# Patient Record
Sex: FEMALE | Race: WHITE | NOT HISPANIC OR LATINO | Employment: OTHER | URBAN - METROPOLITAN AREA
[De-identification: names, ages, dates, MRNs, and addresses within clinical notes are randomized per-mention and may not be internally consistent; named-entity substitution may affect disease eponyms.]

---

## 2023-10-05 ENCOUNTER — HOSPITAL ENCOUNTER (EMERGENCY)
Facility: HOSPITAL | Age: 63
Discharge: HOME | End: 2023-10-05
Attending: STUDENT IN AN ORGANIZED HEALTH CARE EDUCATION/TRAINING PROGRAM
Payer: MEDICAID

## 2023-10-05 VITALS
WEIGHT: 131 LBS | DIASTOLIC BLOOD PRESSURE: 85 MMHG | HEART RATE: 100 BPM | TEMPERATURE: 98 F | RESPIRATION RATE: 26 BRPM | BODY MASS INDEX: 24.11 KG/M2 | SYSTOLIC BLOOD PRESSURE: 116 MMHG | HEIGHT: 62 IN | OXYGEN SATURATION: 94 %

## 2023-10-05 DIAGNOSIS — R52 PAIN: ICD-10-CM

## 2023-10-05 DIAGNOSIS — R00.0 TACHYCARDIA: ICD-10-CM

## 2023-10-05 DIAGNOSIS — Z91.148 NONCOMPLIANCE WITH MEDICATION REGIMEN: ICD-10-CM

## 2023-10-05 DIAGNOSIS — S90.02XA CONTUSION OF LEFT ANKLE, INITIAL ENCOUNTER: Primary | ICD-10-CM

## 2023-10-05 DIAGNOSIS — I48.0 PAROXYSMAL ATRIAL FIBRILLATION WITH RVR: ICD-10-CM

## 2023-10-05 LAB
ALBUMIN SERPL-MCNC: 3.2 G/DL (ref 3.4–4.8)
ALBUMIN/GLOB SERPL: 0.7 RATIO (ref 1.1–2)
ALP SERPL-CCNC: 73 UNIT/L (ref 40–150)
ALT SERPL-CCNC: 19 UNIT/L (ref 0–55)
APAP SERPL-MCNC: <17.4 UG/ML (ref 17.4–30)
AST SERPL-CCNC: 25 UNIT/L (ref 5–34)
BASOPHILS # BLD AUTO: 0.04 X10(3)/MCL
BASOPHILS NFR BLD AUTO: 0.8 %
BILIRUB SERPL-MCNC: 0.6 MG/DL
BUN SERPL-MCNC: 28.4 MG/DL (ref 9.8–20.1)
CALCIUM SERPL-MCNC: 7.8 MG/DL (ref 8.4–10.2)
CHLORIDE SERPL-SCNC: 108 MMOL/L (ref 98–107)
CO2 SERPL-SCNC: 21 MMOL/L (ref 23–31)
CREAT SERPL-MCNC: 2.22 MG/DL (ref 0.55–1.02)
EOSINOPHIL # BLD AUTO: 0.04 X10(3)/MCL (ref 0–0.9)
EOSINOPHIL NFR BLD AUTO: 0.8 %
ERYTHROCYTE [DISTWIDTH] IN BLOOD BY AUTOMATED COUNT: 17 % (ref 11.5–17)
ETHANOL SERPL-MCNC: <10 MG/DL
GFR SERPLBLD CREATININE-BSD FMLA CKD-EPI: 24 MLS/MIN/1.73/M2
GLOBULIN SER-MCNC: 4.7 GM/DL (ref 2.4–3.5)
GLUCOSE SERPL-MCNC: 140 MG/DL (ref 82–115)
HCT VFR BLD AUTO: 29.4 % (ref 37–47)
HGB BLD-MCNC: 8.4 G/DL (ref 12–16)
IMM GRANULOCYTES # BLD AUTO: 0.01 X10(3)/MCL (ref 0–0.04)
IMM GRANULOCYTES NFR BLD AUTO: 0.2 %
LYMPHOCYTES # BLD AUTO: 0.62 X10(3)/MCL (ref 0.6–4.6)
LYMPHOCYTES NFR BLD AUTO: 13.1 %
MAGNESIUM SERPL-MCNC: 2 MG/DL (ref 1.6–2.6)
MCH RBC QN AUTO: 25.5 PG (ref 27–31)
MCHC RBC AUTO-ENTMCNC: 28.6 G/DL (ref 33–36)
MCV RBC AUTO: 89.1 FL (ref 80–94)
MONOCYTES # BLD AUTO: 0.37 X10(3)/MCL (ref 0.1–1.3)
MONOCYTES NFR BLD AUTO: 7.8 %
NEUTROPHILS # BLD AUTO: 3.66 X10(3)/MCL (ref 2.1–9.2)
NEUTROPHILS NFR BLD AUTO: 77.3 %
PLATELET # BLD AUTO: 192 X10(3)/MCL (ref 130–400)
PMV BLD AUTO: 11.5 FL (ref 7.4–10.4)
POC CARDIAC TROPONIN I: 0.01 NG/ML (ref 0–0.08)
POTASSIUM SERPL-SCNC: 4.2 MMOL/L (ref 3.5–5.1)
PROT SERPL-MCNC: 7.9 GM/DL (ref 5.8–7.6)
RBC # BLD AUTO: 3.3 X10(6)/MCL (ref 4.2–5.4)
SALICYLATES SERPL-MCNC: <5 MG/DL (ref 15–30)
SAMPLE: NORMAL
SODIUM SERPL-SCNC: 138 MMOL/L (ref 136–145)
TSH SERPL-ACNC: 13.63 UIU/ML (ref 0.35–4.94)
WBC # SPEC AUTO: 4.74 X10(3)/MCL (ref 4.5–11.5)

## 2023-10-05 PROCEDURE — 93010 ELECTROCARDIOGRAM REPORT: CPT | Mod: ,,, | Performed by: INTERNAL MEDICINE

## 2023-10-05 PROCEDURE — 83735 ASSAY OF MAGNESIUM: CPT | Performed by: STUDENT IN AN ORGANIZED HEALTH CARE EDUCATION/TRAINING PROGRAM

## 2023-10-05 PROCEDURE — 84443 ASSAY THYROID STIM HORMONE: CPT | Performed by: STUDENT IN AN ORGANIZED HEALTH CARE EDUCATION/TRAINING PROGRAM

## 2023-10-05 PROCEDURE — 80143 DRUG ASSAY ACETAMINOPHEN: CPT | Performed by: STUDENT IN AN ORGANIZED HEALTH CARE EDUCATION/TRAINING PROGRAM

## 2023-10-05 PROCEDURE — 93010 EKG 12-LEAD: ICD-10-PCS | Mod: ,,, | Performed by: INTERNAL MEDICINE

## 2023-10-05 PROCEDURE — 96374 THER/PROPH/DIAG INJ IV PUSH: CPT

## 2023-10-05 PROCEDURE — 93005 ELECTROCARDIOGRAM TRACING: CPT

## 2023-10-05 PROCEDURE — 80179 DRUG ASSAY SALICYLATE: CPT | Performed by: STUDENT IN AN ORGANIZED HEALTH CARE EDUCATION/TRAINING PROGRAM

## 2023-10-05 PROCEDURE — 96375 TX/PRO/DX INJ NEW DRUG ADDON: CPT

## 2023-10-05 PROCEDURE — 99285 EMERGENCY DEPT VISIT HI MDM: CPT | Mod: 25

## 2023-10-05 PROCEDURE — 96376 TX/PRO/DX INJ SAME DRUG ADON: CPT

## 2023-10-05 PROCEDURE — 80053 COMPREHEN METABOLIC PANEL: CPT | Performed by: STUDENT IN AN ORGANIZED HEALTH CARE EDUCATION/TRAINING PROGRAM

## 2023-10-05 PROCEDURE — 25000003 PHARM REV CODE 250: Performed by: SPECIALIST

## 2023-10-05 PROCEDURE — 85025 COMPLETE CBC W/AUTO DIFF WBC: CPT | Performed by: STUDENT IN AN ORGANIZED HEALTH CARE EDUCATION/TRAINING PROGRAM

## 2023-10-05 PROCEDURE — 82077 ASSAY SPEC XCP UR&BREATH IA: CPT | Performed by: STUDENT IN AN ORGANIZED HEALTH CARE EDUCATION/TRAINING PROGRAM

## 2023-10-05 PROCEDURE — 25000003 PHARM REV CODE 250: Performed by: STUDENT IN AN ORGANIZED HEALTH CARE EDUCATION/TRAINING PROGRAM

## 2023-10-05 RX ORDER — AMIODARONE HYDROCHLORIDE 200 MG/1
200 TABLET ORAL DAILY
Qty: 30 TABLET | Refills: 0 | Status: ON HOLD | OUTPATIENT
Start: 2023-10-05 | End: 2023-10-11 | Stop reason: SDUPTHER

## 2023-10-05 RX ORDER — METOPROLOL SUCCINATE 50 MG/1
50 TABLET, EXTENDED RELEASE ORAL DAILY
Qty: 30 TABLET | Refills: 0 | Status: ON HOLD | OUTPATIENT
Start: 2023-10-05 | End: 2023-10-11 | Stop reason: SDUPTHER

## 2023-10-05 RX ORDER — DILTIAZEM HYDROCHLORIDE 5 MG/ML
20 INJECTION INTRAVENOUS
Status: COMPLETED | OUTPATIENT
Start: 2023-10-05 | End: 2023-10-05

## 2023-10-05 RX ORDER — DILTIAZEM HYDROCHLORIDE 180 MG/1
180 CAPSULE, COATED, EXTENDED RELEASE ORAL 2 TIMES DAILY
Qty: 60 CAPSULE | Refills: 0 | Status: ON HOLD | OUTPATIENT
Start: 2023-10-05 | End: 2023-10-11 | Stop reason: SDUPTHER

## 2023-10-05 RX ORDER — METOPROLOL TARTRATE 1 MG/ML
5 INJECTION, SOLUTION INTRAVENOUS
Status: DISCONTINUED | OUTPATIENT
Start: 2023-10-05 | End: 2023-10-05

## 2023-10-05 RX ORDER — METOPROLOL TARTRATE 1 MG/ML
5 INJECTION, SOLUTION INTRAVENOUS
Status: COMPLETED | OUTPATIENT
Start: 2023-10-05 | End: 2023-10-05

## 2023-10-05 RX ORDER — DILTIAZEM HYDROCHLORIDE 5 MG/ML
0.25 INJECTION INTRAVENOUS
Status: COMPLETED | OUTPATIENT
Start: 2023-10-05 | End: 2023-10-05

## 2023-10-05 RX ORDER — DILTIAZEM HYDROCHLORIDE 120 MG/1
120 CAPSULE, COATED, EXTENDED RELEASE ORAL
Status: COMPLETED | OUTPATIENT
Start: 2023-10-05 | End: 2023-10-05

## 2023-10-05 RX ADMIN — RIVAROXABAN 20 MG: 10 TABLET, FILM COATED ORAL at 07:10

## 2023-10-05 RX ADMIN — DILTIAZEM HYDROCHLORIDE 20 MG: 5 INJECTION INTRAVENOUS at 06:10

## 2023-10-05 RX ADMIN — DILTIAZEM HYDROCHLORIDE 120 MG: 120 CAPSULE, COATED, EXTENDED RELEASE ORAL at 07:10

## 2023-10-05 RX ADMIN — DILTIAZEM HYDROCHLORIDE 15 MG: 5 INJECTION INTRAVENOUS at 05:10

## 2023-10-05 RX ADMIN — METOPROLOL TARTRATE 5 MG: 1 INJECTION, SOLUTION INTRAVENOUS at 05:10

## 2023-10-05 NOTE — ED PROVIDER NOTES
"Encounter Date: 10/5/2023       History     Chief Complaint   Patient presents with    Tachycardia     Pt presented to [facility] for admission but had a fall earlier today and c/o pain to L ankle -abrasions to nose and L eyebrow -denies LOC so pt sent here for evaluation prior to psych placement -upon [facility] arrival -pt 's --pt denies chest pain      Patient is a 63-year-old white female with a history of anxiety, self-reported irregular heart rate who presented via EMS after being dropped off by her landlord a local psychiatric facility for her "nerves. " patient would like to have checked herself in but she states that on arrival she complains of left ankle pain and thus they called EMS for her to presents to the ER for evaluation.  Patient denies any SI/HI/AH/VH.  Patient was noted by EMS to be tachycardic with an irregular rhythm consistent with AFib with RVR.  Patient was noted to be normotensive.  On arrival patient states she has a contusion above her left eye that she she does not recall how she sustained.  Patient states she has left ankle pain but she is unsure how that occurred.  Patient denies any chest pain, palpitations, shortness of breath or abdominal pain.        Review of patient's allergies indicates:   Allergen Reactions    Pcn [penicillins] Itching     No past medical history on file.  No past surgical history on file.  No family history on file.     Review of Systems   Constitutional:  Negative for chills, fatigue and fever.   HENT:  Negative for congestion, sore throat and trouble swallowing.    Eyes:  Negative for pain and visual disturbance.   Respiratory:  Negative for cough, shortness of breath and wheezing.    Cardiovascular:  Negative for chest pain and palpitations.   Gastrointestinal:  Negative for abdominal pain, blood in stool, constipation, diarrhea, nausea and vomiting.   Genitourinary:  Negative for dysuria and hematuria.   Musculoskeletal:  Positive for arthralgias. Negative " for back pain and myalgias.   Skin:  Positive for wound. Negative for rash.   Neurological:  Negative for seizures, syncope and headaches.   Psychiatric/Behavioral:  Positive for dysphoric mood. Negative for agitation, behavioral problems, confusion, decreased concentration, hallucinations, self-injury, sleep disturbance and suicidal ideas. The patient is nervous/anxious. The patient is not hyperactive.        Physical Exam     Initial Vitals [10/05/23 1637]   BP Pulse Resp Temp SpO2   (!) 141/81 (!) 147 20 98.2 °F (36.8 °C) 99 %      MAP       --         Physical Exam    Nursing note and vitals reviewed.  Constitutional: She appears well-developed and well-nourished. She is not diaphoretic. No distress.   HENT:   Head: Normocephalic.   Right Ear: External ear normal.   Left Ear: External ear normal.   Nose: Nose normal.   Eyes: Conjunctivae and EOM are normal. Pupils are equal, round, and reactive to light. Right eye exhibits no discharge. Left eye exhibits no discharge. No scleral icterus.   Strabismus noted on exam patient states this has baseline.   Neck:   Normal range of motion.  Cardiovascular:  Normal heart sounds.     Exam reveals no gallop and no friction rub.       No murmur heard.  Tachycardic rate   Pulmonary/Chest: Breath sounds normal. No stridor. No respiratory distress. She has no wheezes. She has no rhonchi. She has no rales.   Abdominal: Abdomen is soft. She exhibits no distension. There is no abdominal tenderness. There is no rebound and no guarding.   Musculoskeletal:         General: Normal range of motion.      Cervical back: Normal range of motion.     Neurological: She is alert. She has normal strength. No cranial nerve deficit or sensory deficit. GCS score is 15. GCS eye subscore is 4. GCS verbal subscore is 5. GCS motor subscore is 6.   Skin: Skin is warm. No rash noted. No erythema.   Psychiatric: Her behavior is normal. Judgment normal. Her mood appears anxious. She is not actively  hallucinating. Thought content is not paranoid and not delusional. Cognition and memory are normal. She expresses no homicidal and no suicidal ideation. She is attentive.         ED Course   Procedures  Labs Reviewed   COMPREHENSIVE METABOLIC PANEL - Abnormal; Notable for the following components:       Result Value    Chloride 108 (*)     Carbon Dioxide 21 (*)     Glucose Level 140 (*)     Blood Urea Nitrogen 28.4 (*)     Creatinine 2.22 (*)     Calcium Level Total 7.8 (*)     Protein Total 7.9 (*)     Albumin Level 3.2 (*)     Globulin 4.7 (*)     Albumin/Globulin Ratio 0.7 (*)     All other components within normal limits   SALICYLATE LEVEL - Abnormal; Notable for the following components:    Salicylate Level <5.0 (*)     All other components within normal limits   TSH - Abnormal; Notable for the following components:    TSH 13.631 (*)     All other components within normal limits   ACETAMINOPHEN LEVEL - Abnormal; Notable for the following components:    Acetaminophen Level <17.4 (*)     All other components within normal limits   CBC WITH DIFFERENTIAL - Abnormal; Notable for the following components:    RBC 3.30 (*)     Hgb 8.4 (*)     Hct 29.4 (*)     MCH 25.5 (*)     MCHC 28.6 (*)     MPV 11.5 (*)     All other components within normal limits   ALCOHOL,MEDICAL (ETHANOL) - Normal   MAGNESIUM - Normal   CBC W/ AUTO DIFFERENTIAL    Narrative:     The following orders were created for panel order CBC Auto Differential.  Procedure                               Abnormality         Status                     ---------                               -----------         ------                     CBC with Differential[[id number]]       Abnormal            Final result                 Please view results for these tests on the individual orders.   TROPONIN ISTAT   DRUG SCREEN, URINE (BEAKER)   URINALYSIS, REFLEX TO URINE CULTURE   POCT TROPONIN     EKG Readings: (Independently Interpreted)   Initial Reading: No STEMI.  Rhythm: Atrial Flutter. Heart Rate: 156. Ectopy: No Ectopy. Conduction: Normal. ST Segments: Normal ST Segments.       Imaging Results              CT Head Without Contrast (Final result)  Result time 10/05/23 17:52:32      Final result by Hetal Galeano MD (10/05/23 17:52:32)                   Impression:      No appreciable acute intracranial abnormality.    Periventricular and subcortical white matter changes most compatible with chronic small vessel ischemic disease.      Electronically signed by: Hetal Galeano  Date:    10/05/2023  Time:    17:52               Narrative:    EXAMINATION:  CT HEAD WITHOUT CONTRAST    CLINICAL HISTORY:  contusion to head unknown cause;    TECHNIQUE:  Low dose axial CT images obtained throughout the head without intravenous contrast.  Axial, sagittal and coronal reconstructions were performed and interpreted.    DLP: 2427 mGycm    All CT scans at this location are performed using dose optimization techniques as appropriate to a performed exam including the following automated exposure control, adjustment of the mA and/or kV according to patient size and/or use of iterative reconstruction technique    COMPARISON:  CT head 12/14/2018    FINDINGS:  BRAIN: Gray white differentiation is maintained. Periventricular and subcortical white matter changes most compatible with chronic small vessel ischemic disease.  There are faint basal ganglia calcifications.  No hemorrhage. No edema. No mass effect or midline shift.  The posterior fossa and midline structures are unremarkable.    VENTRICLES: Normal in size and configuration.    EXTRA-AXIAL: No abnormal extra-axial collections.    BONES: Calvarium is intact.    SINUSES AND MASTOIDS: Chronic opacification of the left frontal sinus.                                       X-Ray Ankle Complete Left (Final result)  Result time 10/05/23 17:43:27      Final result by Hetal Galeano MD (10/05/23 17:43:27)                    Impression:      Soft tissue swelling without appreciable acute osseous abnormality.      Electronically signed by: Hetal Galeano  Date:    10/05/2023  Time:    17:43               Narrative:    EXAMINATION:  XR ANKLE COMPLETE 3 VIEW LEFT    CLINICAL HISTORY:  Pain, unspecified    TECHNIQUE:  AP, lateral and oblique views of the left ankle were performed.    COMPARISON:  None    FINDINGS:  BONES: No fracture. No dislocation. Ankle mortise is symmetric.      SOFT TISSUES: Nonfocal soft tissue swelling.                                           X-Ray Chest 1 View (Final result)  Result time 10/05/23 17:41:59      Final result by Meryl Catherine MD (10/05/23 17:41:59)                   Impression:      Prominent interstitial markings without focal consolidation.      Electronically signed by: Meryl Catherine  Date:    10/05/2023  Time:    17:41               Narrative:    EXAMINATION:  XR CHEST 1 VIEW    CLINICAL HISTORY:  a fib;    TECHNIQUE:  AP chest    COMPARISON:  Chest x-ray dated 12/15/2018    FINDINGS:  The heart is normal in size.  There are prominent interstitial markings without focal consolidation.  There is no pleural effusion or visible pneumothorax.                                       Medications   metoprolol injection 5 mg (5 mg Intravenous Given 10/5/23 1701)   diltiaZEM injection 15 mg (15 mg Intravenous Given 10/5/23 1754)   diltiaZEM injection 20 mg (20 mg Intravenous Given 10/5/23 1840)   diltiaZEM 24 hr capsule 120 mg (120 mg Oral Given 10/5/23 1918)   rivaroxaban tablet 20 mg (20 mg Oral Given 10/5/23 1918)     Medical Decision Making  Differentials:  Dysrhythmia, AFib RVR, SVT, electrolyte disturbance, drug abuse, dehydration   63-year-old anxious appearing female presents via EMS with tachycardia but normotensive.  Atrial flutter with rapid rate was identified and it appears per chart review this is chronic.  Patient appears to be noncompliant with medications likely being the cause.   Lopressor x2 was given with only slight improvement this Cardizem was given.  Basic labs largely unremarkable except for hypothyroidism which I do not suspect is the cause.    I, Dr. Lyons, assumed care of this patient at 6:00 p.m.; patient is resting comfortably; her pulse did increase since the 1st dose of Cardizem so she will be given Cardizem 20 mg IV push and will give Cardizem CD p.o. 120 mg along with Xarelto 20 mg p.o.; patient has been noncompliant with her cardiac medications    Amount and/or Complexity of Data Reviewed  External Data Reviewed: labs and notes.  Labs: ordered. Decision-making details documented in ED Course.  Radiology: ordered. Decision-making details documented in ED Course.  ECG/medicine tests: ordered and independent interpretation performed. Decision-making details documented in ED Course.    Risk  Prescription drug management.  Risk Details: Patient's atrial fibrillation has improved with a decreased rate following administration of 20 mg of Cardizem IV and she was given Cardizem 120 mg CD p.o.; her medications for her atrial fibrillation were printed and will be sent with her back to [facility]; patient initially came with left ankle pain and the x-ray was negative and most likely a contusion; recommend RICE; recommend follow up with Cardiology within the next 2-3 days; prescriptions were printed for Cardizem  mg to be taken twice a day, amiodarone 200 mg daily, Xarelto 20 mg daily and Toprol 50 mg daily which were all her previous medications for her atrial fibrillation; she should also continue any other remaining medication she was on previously       Patient Vitals for the past 24 hrs:   BP Temp Temp src Pulse Resp SpO2 Height Weight   10/05/23 2007 116/85 -- -- 90 (!) 33 (!) 82 % -- --   10/05/23 1942 -- -- -- 103 19 -- -- --   10/05/23 1922 109/81 -- -- (!) 116 15 (!) 78 % -- --   10/05/23 1918 109/81 -- -- -- -- -- -- --   10/05/23 1902 105/79 -- -- (!)  "143 19 100 % -- --   10/05/23 1809 113/86 -- -- (!) 117 20 95 % -- --   10/05/23 1715 -- -- -- (!) 124 (!) 27 100 % -- --   10/05/23 1637 (!) 141/81 98.2 °F (36.8 °C) Oral (!) 147 20 99 % 5' 2" (1.575 m) 59.4 kg (131 lb)   Her oxygen saturations prior to discharge were not correlating with her pulse and were incorrect; her oxygen saturation is %              The patient is resting comfortably and in no acute distress.  She states that her symptoms have improved after treatment in Emergency Department. I personally discussed her test results and treatment plan.  Gave strict ED precautions.  Specific conditions for return to the emergency department and importance of follow up with her primary care provided or the physician listed on the discharge instructions.  Patient voices understanding and agrees to the plan discussed. All patients' questions have been answered at this time.   She has remained hemodynamically stable throughout entire stay in ED and is stable for discharge home.             Clinical Impression:   Final diagnoses:  [R00.0] Tachycardia  [R52] Pain  [S90.02XA] Contusion of left ankle, initial encounter (Primary)  [I48.0] Paroxysmal atrial fibrillation with RVR  [Z91.148] Noncompliance with medication regimen        ED Disposition Condition    Discharge Stable          ED Prescriptions       Medication Sig Dispense Start Date End Date Auth. Provider    rivaroxaban (XARELTO) 20 mg Tab Take 1 tablet (20 mg total) by mouth daily with dinner or evening meal. 30 tablet 10/5/2023 -- Julius Lyons MD    amiodarone (PACERONE) 200 MG Tab Take 1 tablet (200 mg total) by mouth once daily. 30 tablet 10/5/2023 -- Julius Lyons MD    metoprolol succinate (TOPROL-XL) 50 MG 24 hr tablet Take 1 tablet (50 mg total) by mouth once daily. 30 tablet 10/5/2023 -- Julius Lyons MD    diltiaZEM (CARDIZEM CD) 180 MG 24 hr capsule Take 1 capsule (180 mg total) by mouth 2 (two) times daily. 60 capsule 10/5/2023 " -- Julius Lyons MD          Follow-up Information       Follow up With Specialties Details Why Contact Info    Cardiology  In 2 days               Julius Lyons MD  10/05/23 2038

## 2023-10-05 NOTE — ED NOTES
Patient arrived here by mae was picked from [facility] from a previous fall wants medical clearance from that fall left ankle pain with swelling abrasions to face. Patient denies any loc states from a group home wanted to go to [facility] because nerves are bad denies SI or HI.

## 2023-10-06 NOTE — ED NOTES
Solange from [facility] called and reports that patient continuity of care would be too high and they will not be able to accept patient

## 2023-10-06 NOTE — ED NOTES
Spoke with Faviola Wang from pt's group home regarding declination for admission @ [facility]. Informed Faviola of care rendered thus far and need for pt to be seen and cleared by a cardiologist before admission to a psychiatric facility. Informed her that pt has been discharged at this time and needs transportation back to their facility. States she will pick pt up but it may be a while.

## 2023-10-08 ENCOUNTER — HOSPITAL ENCOUNTER (INPATIENT)
Facility: HOSPITAL | Age: 63
LOS: 3 days | Discharge: PSYCHIATRIC OR SUBSTANCE USE FACILITY | DRG: 309 | End: 2023-10-11
Attending: EMERGENCY MEDICINE | Admitting: INTERNAL MEDICINE
Payer: MEDICAID

## 2023-10-08 DIAGNOSIS — R00.0 TACHYCARDIA: ICD-10-CM

## 2023-10-08 DIAGNOSIS — R79.89 ELEVATED TROPONIN: ICD-10-CM

## 2023-10-08 DIAGNOSIS — I48.91 ATRIAL FIBRILLATION WITH RAPID VENTRICULAR RESPONSE: ICD-10-CM

## 2023-10-08 DIAGNOSIS — R07.9 CHEST PAIN, UNSPECIFIED TYPE: ICD-10-CM

## 2023-10-08 DIAGNOSIS — R79.89 ELEVATED TROPONIN I LEVEL: ICD-10-CM

## 2023-10-08 DIAGNOSIS — R07.9 CHEST PAIN: ICD-10-CM

## 2023-10-08 DIAGNOSIS — F29 PSYCHOSIS, UNSPECIFIED PSYCHOSIS TYPE: Primary | ICD-10-CM

## 2023-10-08 PROBLEM — I10 ESSENTIAL HYPERTENSION: Status: ACTIVE | Noted: 2023-10-08

## 2023-10-08 PROBLEM — E03.9 HYPOTHYROIDISM: Status: ACTIVE | Noted: 2021-08-15

## 2023-10-08 PROBLEM — F31.9 BIPOLAR DISORDER: Status: ACTIVE | Noted: 2021-07-27

## 2023-10-08 PROBLEM — E78.5 DYSLIPIDEMIA: Status: ACTIVE | Noted: 2023-10-08

## 2023-10-08 PROBLEM — D50.9 IRON DEFICIENCY ANEMIA: Status: ACTIVE | Noted: 2021-12-28

## 2023-10-08 PROBLEM — I48.0 PAROXYSMAL ATRIAL FIBRILLATION: Status: ACTIVE | Noted: 2021-08-15

## 2023-10-08 PROBLEM — I73.9 PAD (PERIPHERAL ARTERY DISEASE): Status: ACTIVE | Noted: 2023-10-08

## 2023-10-08 LAB
ALBUMIN SERPL-MCNC: 3.5 G/DL (ref 3.4–4.8)
ALBUMIN/GLOB SERPL: 0.8 RATIO (ref 1.1–2)
ALP SERPL-CCNC: 74 UNIT/L (ref 40–150)
ALT SERPL-CCNC: 18 UNIT/L (ref 0–55)
APAP SERPL-MCNC: <17.4 UG/ML (ref 17.4–30)
AST SERPL-CCNC: 29 UNIT/L (ref 5–34)
BASOPHILS # BLD AUTO: 0.08 X10(3)/MCL
BASOPHILS NFR BLD AUTO: 1.1 %
BILIRUB SERPL-MCNC: 1 MG/DL
BUN SERPL-MCNC: 24.4 MG/DL (ref 9.8–20.1)
CALCIUM SERPL-MCNC: 9.1 MG/DL (ref 8.4–10.2)
CHLORIDE SERPL-SCNC: 108 MMOL/L (ref 98–107)
CO2 SERPL-SCNC: 20 MMOL/L (ref 23–31)
CREAT SERPL-MCNC: 2.11 MG/DL (ref 0.55–1.02)
EOSINOPHIL # BLD AUTO: 0.01 X10(3)/MCL (ref 0–0.9)
EOSINOPHIL NFR BLD AUTO: 0.1 %
ERYTHROCYTE [DISTWIDTH] IN BLOOD BY AUTOMATED COUNT: 16.8 % (ref 11.5–17)
ETHANOL SERPL-MCNC: <10 MG/DL
GFR SERPLBLD CREATININE-BSD FMLA CKD-EPI: 26 MLS/MIN/1.73/M2
GLOBULIN SER-MCNC: 4.5 GM/DL (ref 2.4–3.5)
GLUCOSE SERPL-MCNC: 131 MG/DL (ref 82–115)
HCT VFR BLD AUTO: 30.1 % (ref 37–47)
HGB BLD-MCNC: 8.3 G/DL (ref 12–16)
IMM GRANULOCYTES # BLD AUTO: 0.03 X10(3)/MCL (ref 0–0.04)
IMM GRANULOCYTES NFR BLD AUTO: 0.4 %
LYMPHOCYTES # BLD AUTO: 0.68 X10(3)/MCL (ref 0.6–4.6)
LYMPHOCYTES NFR BLD AUTO: 9.4 %
MCH RBC QN AUTO: 23.4 PG (ref 27–31)
MCHC RBC AUTO-ENTMCNC: 27.6 G/DL (ref 33–36)
MCV RBC AUTO: 84.8 FL (ref 80–94)
MONOCYTES # BLD AUTO: 0.59 X10(3)/MCL (ref 0.1–1.3)
MONOCYTES NFR BLD AUTO: 8.2 %
NEUTROPHILS # BLD AUTO: 5.81 X10(3)/MCL (ref 2.1–9.2)
NEUTROPHILS NFR BLD AUTO: 80.8 %
NRBC BLD AUTO-RTO: 0 %
PLATELET # BLD AUTO: 194 X10(3)/MCL (ref 130–400)
PMV BLD AUTO: 11.8 FL (ref 7.4–10.4)
POCT GLUCOSE: 136 MG/DL (ref 70–110)
POTASSIUM SERPL-SCNC: 4.6 MMOL/L (ref 3.5–5.1)
PROT SERPL-MCNC: 8 GM/DL (ref 5.8–7.6)
RBC # BLD AUTO: 3.55 X10(6)/MCL (ref 4.2–5.4)
SALICYLATES SERPL-MCNC: <5 MG/DL (ref 15–30)
SODIUM SERPL-SCNC: 139 MMOL/L (ref 136–145)
TROPONIN I SERPL-MCNC: 0.05 NG/ML (ref 0–0.04)
VALPROATE SERPL-MCNC: <12.5 UG/ML (ref 50–100)
WBC # SPEC AUTO: 7.2 X10(3)/MCL (ref 4.5–11.5)

## 2023-10-08 PROCEDURE — 93005 ELECTROCARDIOGRAM TRACING: CPT

## 2023-10-08 PROCEDURE — 96376 TX/PRO/DX INJ SAME DRUG ADON: CPT

## 2023-10-08 PROCEDURE — 25000003 PHARM REV CODE 250

## 2023-10-08 PROCEDURE — 80143 DRUG ASSAY ACETAMINOPHEN: CPT | Performed by: EMERGENCY MEDICINE

## 2023-10-08 PROCEDURE — 96365 THER/PROPH/DIAG IV INF INIT: CPT

## 2023-10-08 PROCEDURE — 82077 ASSAY SPEC XCP UR&BREATH IA: CPT | Performed by: EMERGENCY MEDICINE

## 2023-10-08 PROCEDURE — G0378 HOSPITAL OBSERVATION PER HR: HCPCS

## 2023-10-08 PROCEDURE — 96375 TX/PRO/DX INJ NEW DRUG ADDON: CPT

## 2023-10-08 PROCEDURE — 96366 THER/PROPH/DIAG IV INF ADDON: CPT

## 2023-10-08 PROCEDURE — 80053 COMPREHEN METABOLIC PANEL: CPT | Performed by: EMERGENCY MEDICINE

## 2023-10-08 PROCEDURE — 25000003 PHARM REV CODE 250: Performed by: EMERGENCY MEDICINE

## 2023-10-08 PROCEDURE — 84484 ASSAY OF TROPONIN QUANT: CPT | Performed by: EMERGENCY MEDICINE

## 2023-10-08 PROCEDURE — 63600175 PHARM REV CODE 636 W HCPCS: Performed by: EMERGENCY MEDICINE

## 2023-10-08 PROCEDURE — 85025 COMPLETE CBC W/AUTO DIFF WBC: CPT | Performed by: EMERGENCY MEDICINE

## 2023-10-08 PROCEDURE — 80164 ASSAY DIPROPYLACETIC ACD TOT: CPT

## 2023-10-08 PROCEDURE — 21400001 HC TELEMETRY ROOM

## 2023-10-08 PROCEDURE — 99285 EMERGENCY DEPT VISIT HI MDM: CPT

## 2023-10-08 PROCEDURE — 82962 GLUCOSE BLOOD TEST: CPT

## 2023-10-08 PROCEDURE — 80179 DRUG ASSAY SALICYLATE: CPT | Performed by: EMERGENCY MEDICINE

## 2023-10-08 RX ORDER — AMIODARONE HYDROCHLORIDE 200 MG/1
200 TABLET ORAL DAILY
Status: DISCONTINUED | OUTPATIENT
Start: 2023-10-09 | End: 2023-10-11 | Stop reason: HOSPADM

## 2023-10-08 RX ORDER — MAGNESIUM SULFATE HEPTAHYDRATE 40 MG/ML
2 INJECTION, SOLUTION INTRAVENOUS
Status: COMPLETED | OUTPATIENT
Start: 2023-10-08 | End: 2023-10-08

## 2023-10-08 RX ORDER — SODIUM CHLORIDE 0.9 % (FLUSH) 0.9 %
10 SYRINGE (ML) INJECTION EVERY 12 HOURS PRN
Status: DISCONTINUED | OUTPATIENT
Start: 2023-10-09 | End: 2023-10-11 | Stop reason: HOSPADM

## 2023-10-08 RX ORDER — ISOSORBIDE MONONITRATE 30 MG/1
30 TABLET, EXTENDED RELEASE ORAL
Status: ON HOLD | COMMUNITY
Start: 2023-10-05 | End: 2023-10-11 | Stop reason: SDUPTHER

## 2023-10-08 RX ORDER — DILTIAZEM HYDROCHLORIDE 5 MG/ML
25 INJECTION INTRAVENOUS
Status: COMPLETED | OUTPATIENT
Start: 2023-10-08 | End: 2023-10-08

## 2023-10-08 RX ORDER — ISOSORBIDE MONONITRATE 30 MG/1
30 TABLET, EXTENDED RELEASE ORAL DAILY
Status: DISCONTINUED | OUTPATIENT
Start: 2023-10-09 | End: 2023-10-09

## 2023-10-08 RX ORDER — FUROSEMIDE 20 MG/1
20 TABLET ORAL EVERY MORNING
Status: ON HOLD | COMMUNITY
Start: 2023-10-05 | End: 2023-10-11 | Stop reason: SDUPTHER

## 2023-10-08 RX ORDER — METOPROLOL TARTRATE 1 MG/ML
5 INJECTION, SOLUTION INTRAVENOUS
Status: DISCONTINUED | OUTPATIENT
Start: 2023-10-08 | End: 2023-10-08

## 2023-10-08 RX ORDER — ARIPIPRAZOLE 2 MG/1
2 TABLET ORAL
Status: ON HOLD | COMMUNITY
End: 2023-10-11 | Stop reason: HOSPADM

## 2023-10-08 RX ORDER — DILTIAZEM HYDROCHLORIDE 180 MG/1
180 CAPSULE, COATED, EXTENDED RELEASE ORAL 2 TIMES DAILY
Status: DISCONTINUED | OUTPATIENT
Start: 2023-10-09 | End: 2023-10-11 | Stop reason: HOSPADM

## 2023-10-08 RX ORDER — INSULIN ASPART 100 [IU]/ML
0-5 INJECTION, SOLUTION INTRAVENOUS; SUBCUTANEOUS
Status: DISCONTINUED | OUTPATIENT
Start: 2023-10-09 | End: 2023-10-11 | Stop reason: HOSPADM

## 2023-10-08 RX ORDER — POLYETHYLENE GLYCOL 3350 17 G/17G
17 POWDER, FOR SOLUTION ORAL DAILY
Status: DISCONTINUED | OUTPATIENT
Start: 2023-10-09 | End: 2023-10-11 | Stop reason: HOSPADM

## 2023-10-08 RX ORDER — METOPROLOL TARTRATE 1 MG/ML
5 INJECTION, SOLUTION INTRAVENOUS EVERY 5 MIN PRN
Status: COMPLETED | OUTPATIENT
Start: 2023-10-09 | End: 2023-10-09

## 2023-10-08 RX ORDER — ATORVASTATIN CALCIUM 20 MG/1
TABLET, FILM COATED ORAL
Status: ON HOLD | COMMUNITY
Start: 2023-10-04 | End: 2023-10-11 | Stop reason: SDUPTHER

## 2023-10-08 RX ORDER — METOPROLOL TARTRATE 1 MG/ML
5 INJECTION, SOLUTION INTRAVENOUS
Status: COMPLETED | OUTPATIENT
Start: 2023-10-08 | End: 2023-10-08

## 2023-10-08 RX ORDER — ESCITALOPRAM OXALATE 10 MG/1
1 TABLET ORAL DAILY
Status: ON HOLD | COMMUNITY
End: 2023-10-11 | Stop reason: SDUPTHER

## 2023-10-08 RX ORDER — BENAZEPRIL HYDROCHLORIDE 10 MG/1
10 TABLET ORAL
Status: ON HOLD | COMMUNITY
End: 2023-10-11 | Stop reason: SDUPTHER

## 2023-10-08 RX ORDER — METOPROLOL TARTRATE 1 MG/ML
INJECTION, SOLUTION INTRAVENOUS
Status: COMPLETED
Start: 2023-10-08 | End: 2023-10-08

## 2023-10-08 RX ORDER — DILTIAZEM HYDROCHLORIDE 180 MG/1
180 CAPSULE, COATED, EXTENDED RELEASE ORAL
Status: COMPLETED | OUTPATIENT
Start: 2023-10-08 | End: 2023-10-08

## 2023-10-08 RX ORDER — DILTIAZEM HYDROCHLORIDE 5 MG/ML
20 INJECTION INTRAVENOUS
Status: DISCONTINUED | OUTPATIENT
Start: 2023-10-08 | End: 2023-10-08

## 2023-10-08 RX ORDER — GLUCAGON 1 MG
1 KIT INJECTION
Status: DISCONTINUED | OUTPATIENT
Start: 2023-10-09 | End: 2023-10-11 | Stop reason: HOSPADM

## 2023-10-08 RX ORDER — AMIODARONE HYDROCHLORIDE 200 MG/1
200 TABLET ORAL
Status: COMPLETED | OUTPATIENT
Start: 2023-10-08 | End: 2023-10-08

## 2023-10-08 RX ORDER — ARIPIPRAZOLE 2 MG/1
2 TABLET ORAL DAILY
Status: DISCONTINUED | OUTPATIENT
Start: 2023-10-09 | End: 2023-10-11

## 2023-10-08 RX ORDER — NALOXONE HCL 0.4 MG/ML
0.02 VIAL (ML) INJECTION
Status: DISCONTINUED | OUTPATIENT
Start: 2023-10-09 | End: 2023-10-11 | Stop reason: HOSPADM

## 2023-10-08 RX ORDER — DIVALPROEX SODIUM 250 MG/1
250 TABLET, DELAYED RELEASE ORAL EVERY 12 HOURS
Status: DISCONTINUED | OUTPATIENT
Start: 2023-10-08 | End: 2023-10-11 | Stop reason: HOSPADM

## 2023-10-08 RX ORDER — LEVOTHYROXINE SODIUM 25 UG/1
25 TABLET ORAL
Status: DISCONTINUED | OUTPATIENT
Start: 2023-10-09 | End: 2023-10-11 | Stop reason: HOSPADM

## 2023-10-08 RX ORDER — METOPROLOL SUCCINATE 50 MG/1
50 TABLET, EXTENDED RELEASE ORAL DAILY
Status: DISCONTINUED | OUTPATIENT
Start: 2023-10-09 | End: 2023-10-10

## 2023-10-08 RX ORDER — IBUPROFEN 200 MG
16 TABLET ORAL
Status: DISCONTINUED | OUTPATIENT
Start: 2023-10-09 | End: 2023-10-11 | Stop reason: HOSPADM

## 2023-10-08 RX ORDER — ATORVASTATIN CALCIUM 20 MG/1
20 TABLET, FILM COATED ORAL DAILY
Status: DISCONTINUED | OUTPATIENT
Start: 2023-10-09 | End: 2023-10-11 | Stop reason: HOSPADM

## 2023-10-08 RX ORDER — ADENOSINE 3 MG/ML
6 INJECTION, SOLUTION INTRAVENOUS
Status: COMPLETED | OUTPATIENT
Start: 2023-10-08 | End: 2023-10-08

## 2023-10-08 RX ORDER — DILTIAZEM HYDROCHLORIDE 5 MG/ML
20 INJECTION INTRAVENOUS
Status: COMPLETED | OUTPATIENT
Start: 2023-10-08 | End: 2023-10-08

## 2023-10-08 RX ORDER — TRAZODONE HYDROCHLORIDE 100 MG/1
TABLET ORAL
Status: ON HOLD | COMMUNITY
Start: 2023-10-04 | End: 2023-10-11 | Stop reason: SDUPTHER

## 2023-10-08 RX ORDER — SERTRALINE HYDROCHLORIDE 100 MG/1
TABLET, FILM COATED ORAL
Status: ON HOLD | COMMUNITY
Start: 2023-10-04 | End: 2023-10-11 | Stop reason: SDUPTHER

## 2023-10-08 RX ORDER — ACETAMINOPHEN 325 MG/1
650 TABLET ORAL EVERY 4 HOURS PRN
Status: DISCONTINUED | OUTPATIENT
Start: 2023-10-09 | End: 2023-10-11 | Stop reason: HOSPADM

## 2023-10-08 RX ORDER — LEVOTHYROXINE SODIUM 25 UG/1
1 TABLET ORAL DAILY
Status: ON HOLD | COMMUNITY
End: 2023-10-11 | Stop reason: SDUPTHER

## 2023-10-08 RX ORDER — IBUPROFEN 200 MG
24 TABLET ORAL
Status: DISCONTINUED | OUTPATIENT
Start: 2023-10-09 | End: 2023-10-11 | Stop reason: HOSPADM

## 2023-10-08 RX ORDER — DILTIAZEM HYDROCHLORIDE 5 MG/ML
INJECTION INTRAVENOUS
Status: DISPENSED
Start: 2023-10-08 | End: 2023-10-09

## 2023-10-08 RX ORDER — TALC
6 POWDER (GRAM) TOPICAL NIGHTLY PRN
Status: DISCONTINUED | OUTPATIENT
Start: 2023-10-08 | End: 2023-10-11 | Stop reason: HOSPADM

## 2023-10-08 RX ORDER — DIVALPROEX SODIUM 250 MG/1
TABLET, DELAYED RELEASE ORAL
Status: ON HOLD | COMMUNITY
Start: 2023-10-04 | End: 2023-10-11 | Stop reason: SDUPTHER

## 2023-10-08 RX ADMIN — MAGNESIUM SULFATE HEPTAHYDRATE 2 G: 40 INJECTION, SOLUTION INTRAVENOUS at 10:10

## 2023-10-08 RX ADMIN — METOPROLOL TARTRATE 5 MG: 1 INJECTION, SOLUTION INTRAVENOUS at 08:10

## 2023-10-08 RX ADMIN — DILTIAZEM HYDROCHLORIDE 20 MG: 5 INJECTION, SOLUTION INTRAVENOUS at 08:10

## 2023-10-08 RX ADMIN — SODIUM CHLORIDE 1000 ML: 9 INJECTION, SOLUTION INTRAVENOUS at 10:10

## 2023-10-08 RX ADMIN — MAGNESIUM SULFATE HEPTAHYDRATE 2 G: 40 INJECTION, SOLUTION INTRAVENOUS at 08:10

## 2023-10-08 RX ADMIN — DILTIAZEM HYDROCHLORIDE 25 MG: 5 INJECTION, SOLUTION INTRAVENOUS at 09:10

## 2023-10-08 RX ADMIN — AMIODARONE HYDROCHLORIDE 200 MG: 200 TABLET ORAL at 09:10

## 2023-10-08 RX ADMIN — DILTIAZEM HYDROCHLORIDE 180 MG: 180 CAPSULE, COATED, EXTENDED RELEASE ORAL at 09:10

## 2023-10-08 RX ADMIN — DIVALPROEX SODIUM 250 MG: 250 TABLET, DELAYED RELEASE ORAL at 11:10

## 2023-10-08 RX ADMIN — ADENOSINE 6 MG: 3 INJECTION INTRAVENOUS at 08:10

## 2023-10-08 NOTE — ED PROVIDER NOTES
"Encounter Date: 10/8/2023       History     Chief Complaint   Patient presents with    Psychiatric Evaluation     C/o SI and auditory hallucinations. Juan M cruz present and reports paranoia. Pt states "I need help"       Mental Health Problem  The primary symptoms include bizarre behavior, delusions, disorganized speech, disorganized thinking, dysphoric mood and paranoia. The primary symptoms do not include aggression, agitation, depressed mood, hallucinations, homicidal ideas, negative symptoms, self-injury, somatic symptoms, suicidal ideas, suicidal threats or suicide attempt. The current episode started several days ago. This is a recurrent problem.   The degree of incapacity that she is experiencing as a consequence of her illness is moderate. Sequelae of the illness include an inability to work and harmed interpersonal relations. Additional symptoms of the illness include anhedonia, insomnia, attention impairment, flight of ideas, distractible and poor judgment. Additional symptoms of the illness do not include hypersomnia, appetite change, unexpected weight change, fatigue, agitation, psychomotor retardation, feelings of worthlessness, euphoric mood, increased goal-directed activity, inflated self-esteem, decreased need for sleep, visual change, headaches, abdominal pain or seizures. She does not admit to suicidal ideas. She does not have a plan to attempt suicide. She does not contemplate harming herself. She has not already injured self. She does not contemplate injuring another person. She has not already  injured another person. Risk factors that are present for mental illness include a history of mental illness.     Review of patient's allergies indicates:   Allergen Reactions    Pcn [penicillins] Itching     History reviewed. No pertinent past medical history.  History reviewed. No pertinent surgical history.  History reviewed. No pertinent family history.  Social History     Tobacco Use    Smoking " status: Never    Smokeless tobacco: Never   Substance Use Topics    Drug use: Not Currently     Review of Systems   Constitutional:  Negative for appetite change, fatigue and unexpected weight change.   Gastrointestinal:  Negative for abdominal pain.   Neurological:  Negative for seizures and headaches.   Psychiatric/Behavioral:  Positive for dysphoric mood and paranoia. Negative for agitation, hallucinations, homicidal ideas, self-injury and suicidal ideas. The patient has insomnia.    All other systems reviewed and are negative.      Physical Exam     Initial Vitals [10/08/23 1429]   BP Pulse Resp Temp SpO2   134/88 102 16 98.2 °F (36.8 °C) 97 %      MAP       --         Physical Exam    Nursing note and vitals reviewed.  Constitutional: She appears well-developed and well-nourished. She is not diaphoretic. No distress.   HENT:   Head: Normocephalic and atraumatic.   Right Ear: External ear normal.   Left Ear: External ear normal.   Eyes: EOM are normal. Pupils are equal, round, and reactive to light. Right eye exhibits no discharge. Left eye exhibits no discharge.   Neck: Neck supple. No thyromegaly present. No tracheal deviation present. No JVD present.   Normal range of motion.  Cardiovascular:  Normal rate, regular rhythm, normal heart sounds and intact distal pulses.     Exam reveals no gallop and no friction rub.       No murmur heard.  Pulmonary/Chest: Breath sounds normal. No stridor. No respiratory distress. She has no wheezes. She has no rhonchi. She has no rales.   Abdominal: Abdomen is soft. Bowel sounds are normal. She exhibits no distension. There is no abdominal tenderness. There is no rebound and no guarding.   Musculoskeletal:         General: No tenderness or edema. Normal range of motion.      Cervical back: Normal range of motion and neck supple.     Neurological: She is alert and oriented to person, place, and time. She has normal strength. No cranial nerve deficit or sensory deficit. GCS  score is 15. GCS eye subscore is 4. GCS verbal subscore is 5. GCS motor subscore is 6.   Skin: Skin is warm and dry. Capillary refill takes less than 2 seconds. No rash and no abscess noted. No erythema. No pallor.   Psychiatric:   Broad affect; disorganized though with paranoid delusions; unable to contract for safety ;         ED Course   Procedures  Labs Reviewed   DRUG SCREEN, URINE (BEAKER) - Abnormal; Notable for the following components:       Result Value    Amphetamines, Urine Positive (*)     All other components within normal limits    Narrative:     Cut off concentrations:    Amphetamines - 1000 ng/ml  Barbiturates - 200 ng/ml  Benzodiazepine - 200 ng/ml  Cannabinoids (THC) - 50 ng/ml  Cocaine - 300 ng/ml  Fentanyl - 1.0 ng/ml  MDMA - 500 ng/ml  Opiates - 300 ng/ml   Phencyclidine (PCP) - 25 ng/ml    Specimen submitted for drug analysis and tested for pH and specific gravity in order to evaluate sample integrity. Suspect tampering if specific gravity is <1.003 and/or pH is not within the range of 4.5 - 8.0  False negatives may result form substances such as bleach added to urine.  False positives may result for the presence of a substance with similar chemical structure to the drug or its metabolite.    This test provides only a PRELIMINARY analytical test result. A more specific alternate chemical method must be used in order to obtain a confirmed analytical result. Gas chromatography/mass spectrometry (GC/MS) is the preferred confirmatory method. Other chemical confirmation methods are available. Clinical consideration and professional judgement should be applied to any drug of abuse test result, particularly when preliminary positive results are used.    Positive results will be confirmed only at the physicians request. Unconfirmed screening results are to be used only for medical purposes (treatment).        URINALYSIS, REFLEX TO URINE CULTURE - Abnormal; Notable for the following components:     Appearance, UA Slightly Cloudy (*)     Protein, UA 2+ (*)     Blood, UA Trace (*)     Squamous Epithelial Cells, UA Moderate (*)     Mucous, UA Trace (*)     RBC, UA 6-10 (*)     All other components within normal limits   SALICYLATE LEVEL - Abnormal; Notable for the following components:    Salicylate Level <5.0 (*)     All other components within normal limits   ACETAMINOPHEN LEVEL - Abnormal; Notable for the following components:    Acetaminophen Level <17.4 (*)     All other components within normal limits   COMPREHENSIVE METABOLIC PANEL - Abnormal; Notable for the following components:    Chloride 108 (*)     Carbon Dioxide 20 (*)     Glucose Level 131 (*)     Blood Urea Nitrogen 24.4 (*)     Creatinine 2.11 (*)     Protein Total 8.0 (*)     Globulin 4.5 (*)     Albumin/Globulin Ratio 0.8 (*)     All other components within normal limits   CBC WITH DIFFERENTIAL - Abnormal; Notable for the following components:    RBC 3.55 (*)     Hgb 8.3 (*)     Hct 30.1 (*)     MCH 23.4 (*)     MCHC 27.6 (*)     MPV 11.8 (*)     All other components within normal limits   TROPONIN I - Abnormal; Notable for the following components:    Troponin-I 0.047 (*)     All other components within normal limits   VALPROIC ACID - Abnormal; Notable for the following components:    Valproic Acid Level <12.5 (*)     All other components within normal limits   POCT GLUCOSE - Abnormal; Notable for the following components:    POCT Glucose 136 (*)     All other components within normal limits   ALCOHOL,MEDICAL (ETHANOL) - Normal   TROPONIN I - Normal   CBC W/ AUTO DIFFERENTIAL    Narrative:     The following orders were created for panel order CBC Auto Differential.  Procedure                               Abnormality         Status                     ---------                               -----------         ------                     CBC with Differential[[id number]]       Abnormal            Final result                 Please view results  for these tests on the individual orders.   EXTRA TUBES    Narrative:     The following orders were created for panel order EXTRA TUBES.  Procedure                               Abnormality         Status                     ---------                               -----------         ------                     Light Blue Top Hold[[id number]]                             In process                 Gold Top Hold[[id number]]                                   In process                   Please view results for these tests on the individual orders.   LIGHT BLUE TOP HOLD   GOLD TOP HOLD   URINALYSIS, REFLEX TO URINE CULTURE   POCT GLUCOSE MONITORING CONTINUOUS        ECG Results              EKG 12-lead (In process)  Result time 10/09/23 06:21:10      In process by Interface, Lab In [facility] (10/09/23 06:21:10)                   Narrative:    Test Reason : R07.9,    Vent. Rate : 131 BPM     Atrial Rate : 262 BPM     P-R Int : 000 ms          QRS Dur : 078 ms      QT Int : 264 ms       P-R-T Axes : 000 085 -13 degrees     QTc Int : 389 ms    Atrial flutter with variable A-V block  Nonspecific T wave abnormality  Abnormal ECG  When compared with ECG of 08-OCT-2023 20:21,  Atrial flutter has replaced Atrial fibrillation  Nonspecific T wave abnormality now evident in Lateral leads    Referred By: [email or web address]   SELF           Confirmed By:                                      EKG 12-lead (In process)  Result time 10/09/23 06:22:23      In process by Interface, Lab In [facility] (10/09/23 06:22:23)                   Narrative:    Test Reason : R00.0,    Vent. Rate : 146 BPM     Atrial Rate : 141 BPM     P-R Int : 000 ms          QRS Dur : 080 ms      QT Int : 324 ms       P-R-T Axes : 000 092 016 degrees     QTc Int : 504 ms    Atrial fibrillation with rapid ventricular response  Rightward axis  Abnormal ECG  When compared with ECG of 08-OCT-2023 20:05,  Previous ECG has undetermined rhythm, needs review    Referred By: [email or web address]    SELF           Confirmed By:                                   Imaging Results              X-Ray Chest 1 View (In process)                      Medications   diltiaZEM (CARDIZEM) 5 mg/mL injection (25 mg  Not Given 10/8/23 2025)   rivaroxaban tablet 15 mg (has no administration in time range)   metoprolol succinate (TOPROL-XL) 24 hr tablet 50 mg (has no administration in time range)   diltiaZEM 24 hr capsule 180 mg (has no administration in time range)   amiodarone tablet 200 mg (has no administration in time range)   sodium chloride 0.9% flush 10 mL (has no administration in time range)   naloxone 0.4 mg/mL injection 0.02 mg (has no administration in time range)   glucose chewable tablet 16 g (has no administration in time range)   glucose chewable tablet 24 g (has no administration in time range)   dextrose 10% bolus 125 mL 125 mL (has no administration in time range)   dextrose 10% bolus 250 mL 250 mL (has no administration in time range)   glucagon (human recombinant) injection 1 mg (has no administration in time range)   insulin aspart U-100 injection 0-5 Units (has no administration in time range)   acetaminophen tablet 650 mg (has no administration in time range)   polyethylene glycol packet 17 g (has no administration in time range)   levothyroxine tablet 25 mcg (25 mcg Oral Given 10/9/23 0625)   divalproex EC tablet 250 mg (250 mg Oral Given 10/8/23 2343)   ARIPiprazole tablet 2 mg (has no administration in time range)   atorvastatin tablet 20 mg (has no administration in time range)   melatonin tablet 6 mg (6 mg Oral Given 10/9/23 0325)   adenosine injection 6 mg (6 mg Intravenous Given 10/8/23 2012)   metoprolol injection 5 mg (5 mg Intravenous Given 10/8/23 2017)   diltiaZEM injection 20 mg (20 mg Intravenous Given 10/8/23 2021)   magnesium sulfate 2g in water 50mL IVPB (premix) (0 g Intravenous Stopped 10/8/23 2308)   amiodarone tablet 200 mg (200 mg Oral Given 10/8/23 2139)   diltiaZEM 24 hr capsule  180 mg (180 mg Oral Given 10/8/23 2139)   diltiaZEM injection 25 mg (25 mg Intravenous Given 10/8/23 2139)   sodium chloride 0.9% bolus 1,000 mL 1,000 mL (0 mLs Intravenous Stopped 10/9/23 0109)   metoprolol injection 5 mg (5 mg Intravenous Given 10/9/23 0325)   rivaroxaban tablet 15 mg (15 mg Oral Given 10/9/23 0058)     Medical Decision Making  63-year-old woman presents with persecutory delusional structures, pressured speech, flight of ideas.  Patient has reasonable insight though impaired impulse control and judgment.  Unable to contract for safety.    Amount and/or Complexity of Data Reviewed  Labs: ordered. Decision-making details documented in ED Course.  Discussion of management or test interpretation with external provider(s): Formal mental health evaluation    Risk  Prescription drug management.  Decision regarding hospitalization.  Risk Details: Medically cleared, requires admission for formal mental health evaluation.               ED Course as of 10/09/23 0708   Sun Oct 08, 2023   1546 Reassuring hemogram ; [CT]   1625 Negative salicylates, negative tylenol, negative ethanol ; [CT]   1625 Chemistries with ckd in sequence with nearline time comparisons ; [CT]   2024 Patient noted to be in what initially appeared as SVT on ECG with . Given 6 mg adenosine and rated temporarily slowed to reveal underlying atrial fibrillation. Given 5 mg IV metoprolol with minimal improvement of HR from 180s to 150s-160s, so I decided to just give diltiazem bolus instead given the minimal response to metoprolol. [IB]   2055 Troponin I(!): 0.047 [IB]      ED Course User Index  [CT] Jose Bojorquez MD  [IB] Hilario Ambrocio,        Medically cleared for psychiatry placement: 10/8/2023  4:33 PM            Clinical Impression:   Final diagnoses:  [F29] Psychosis, unspecified psychosis type (Primary)  [I48.91] Atrial fibrillation with rapid ventricular response  [R79.89] Elevated troponin I level        ED  Disposition Condition    Observation Stable                  Jose Bojorquez MD  10/08/23 1633       Jose Bojorquez MD  10/09/23 0727

## 2023-10-09 LAB
ALBUMIN SERPL-MCNC: 3.1 G/DL (ref 3.4–4.8)
ALBUMIN/GLOB SERPL: 0.7 RATIO (ref 1.1–2)
ALP SERPL-CCNC: 73 UNIT/L (ref 40–150)
ALT SERPL-CCNC: 20 UNIT/L (ref 0–55)
AMPHET UR QL SCN: POSITIVE
APPEARANCE UR: ABNORMAL
AST SERPL-CCNC: 30 UNIT/L (ref 5–34)
AV INDEX (PROSTH): 0.64
AV MEAN GRADIENT: 3 MMHG
AV PEAK GRADIENT: 5 MMHG
AV VALVE AREA BY VELOCITY RATIO: 2.01 CM²
AV VALVE AREA: 2.28 CM²
AV VELOCITY RATIO: 0.57
BACTERIA #/AREA URNS AUTO: ABNORMAL /HPF
BARBITURATE SCN PRESENT UR: NEGATIVE
BASOPHILS # BLD AUTO: 0.12 X10(3)/MCL
BASOPHILS NFR BLD AUTO: 1.3 %
BENZODIAZ UR QL SCN: NEGATIVE
BILIRUB SERPL-MCNC: 1.3 MG/DL
BILIRUB UR QL STRIP.AUTO: NEGATIVE
BSA FOR ECHO PROCEDURE: 1.73 M2
BUN SERPL-MCNC: 28 MG/DL (ref 9.8–20.1)
CALCIUM SERPL-MCNC: 8.4 MG/DL (ref 8.4–10.2)
CANNABINOIDS UR QL SCN: NEGATIVE
CHLORIDE SERPL-SCNC: 107 MMOL/L (ref 98–107)
CO2 SERPL-SCNC: 18 MMOL/L (ref 23–31)
COCAINE UR QL SCN: NEGATIVE
COLOR UR AUTO: YELLOW
CREAT SERPL-MCNC: 2.29 MG/DL (ref 0.55–1.02)
CV ECHO LV RWT: 0.52 CM
DOP CALC AO PEAK VEL: 1.15 M/S
DOP CALC AO VTI: 15.3 CM
DOP CALC LVOT AREA: 3.6 CM2
DOP CALC LVOT DIAMETER: 2.13 CM
DOP CALC LVOT PEAK VEL: 0.65 M/S
DOP CALC LVOT STROKE VOLUME: 34.9 CM3
DOP CALC MV VTI: 16.8 CM
DOP CALCLVOT PEAK VEL VTI: 9.8 CM
E WAVE DECELERATION TIME: 58.22 MSEC
E/A RATIO: 1.73
E/E' RATIO: 11.73 M/S
ECHO LV POSTERIOR WALL: 1.13 CM (ref 0.6–1.1)
EOSINOPHIL # BLD AUTO: 0.08 X10(3)/MCL (ref 0–0.9)
EOSINOPHIL NFR BLD AUTO: 0.8 %
ERYTHROCYTE [DISTWIDTH] IN BLOOD BY AUTOMATED COUNT: 17 % (ref 11.5–17)
EST. AVERAGE GLUCOSE BLD GHB EST-MCNC: 119.8 MG/DL
FENTANYL UR QL SCN: NEGATIVE
FRACTIONAL SHORTENING: 18 % (ref 28–44)
GFR SERPLBLD CREATININE-BSD FMLA CKD-EPI: 23 MLS/MIN/1.73/M2
GLOBULIN SER-MCNC: 4.2 GM/DL (ref 2.4–3.5)
GLUCOSE SERPL-MCNC: 120 MG/DL (ref 82–115)
GLUCOSE UR QL STRIP.AUTO: NORMAL
HBA1C MFR BLD: 5.8 %
HCT VFR BLD AUTO: 30.8 % (ref 37–47)
HGB BLD-MCNC: 8.6 G/DL (ref 12–16)
HYALINE CASTS #/AREA URNS LPF: ABNORMAL /LPF
IMM GRANULOCYTES # BLD AUTO: 0.03 X10(3)/MCL (ref 0–0.04)
IMM GRANULOCYTES NFR BLD AUTO: 0.3 %
INTERVENTRICULAR SEPTUM: 1.48 CM (ref 0.6–1.1)
KETONES UR QL STRIP.AUTO: NEGATIVE
LEFT ATRIUM SIZE: 4.75 CM
LEFT INTERNAL DIMENSION IN SYSTOLE: 3.58 CM (ref 2.1–4)
LEFT VENTRICLE DIASTOLIC VOLUME INDEX: 49.54 ML/M2
LEFT VENTRICLE DIASTOLIC VOLUME: 84.71 ML
LEFT VENTRICLE MASS INDEX: 124 G/M2
LEFT VENTRICLE SYSTOLIC VOLUME INDEX: 31.5 ML/M2
LEFT VENTRICLE SYSTOLIC VOLUME: 53.86 ML
LEFT VENTRICULAR INTERNAL DIMENSION IN DIASTOLE: 4.34 CM (ref 3.5–6)
LEFT VENTRICULAR MASS: 211.89 G
LEUKOCYTE ESTERASE UR QL STRIP.AUTO: NEGATIVE
LV LATERAL E/E' RATIO: 9.78 M/S
LV SEPTAL E/E' RATIO: 14.67 M/S
LVOT MG: 0.95 MMHG
LVOT MV: 0.45 CM/S
LYMPHOCYTES # BLD AUTO: 1.22 X10(3)/MCL (ref 0.6–4.6)
LYMPHOCYTES NFR BLD AUTO: 12.8 %
MAGNESIUM SERPL-MCNC: 3.1 MG/DL (ref 1.6–2.6)
MCH RBC QN AUTO: 23.7 PG (ref 27–31)
MCHC RBC AUTO-ENTMCNC: 27.9 G/DL (ref 33–36)
MCV RBC AUTO: 84.8 FL (ref 80–94)
MDMA UR QL SCN: NEGATIVE
MONOCYTES # BLD AUTO: 0.75 X10(3)/MCL (ref 0.1–1.3)
MONOCYTES NFR BLD AUTO: 7.9 %
MUCOUS THREADS URNS QL MICRO: ABNORMAL /LPF
MV MEAN GRADIENT: 2 MMHG
MV PEAK A VEL: 0.51 M/S
MV PEAK E VEL: 0.88 M/S
MV PEAK GRADIENT: 5 MMHG
MV STENOSIS PRESSURE HALF TIME: 16.88 MS
MV VALVE AREA BY CONTINUITY EQUATION: 2.08 CM2
MV VALVE AREA P 1/2 METHOD: 13.03 CM2
NEUTROPHILS # BLD AUTO: 7.35 X10(3)/MCL (ref 2.1–9.2)
NEUTROPHILS NFR BLD AUTO: 76.9 %
NITRITE UR QL STRIP.AUTO: NEGATIVE
NRBC BLD AUTO-RTO: 0 %
OPIATES UR QL SCN: NEGATIVE
PCP UR QL: NEGATIVE
PH UR STRIP.AUTO: 5.5 [PH]
PH UR: 5.5 [PH] (ref 3–11)
PHOSPHATE SERPL-MCNC: 4.6 MG/DL (ref 2.3–4.7)
PISA TR MAX VEL: 2.99 M/S
PLATELET # BLD AUTO: 244 X10(3)/MCL (ref 130–400)
PMV BLD AUTO: 12.2 FL (ref 7.4–10.4)
POCT GLUCOSE: 111 MG/DL (ref 70–110)
POCT GLUCOSE: 164 MG/DL (ref 70–110)
POCT GLUCOSE: 166 MG/DL (ref 70–110)
POCT GLUCOSE: 184 MG/DL (ref 70–110)
POTASSIUM SERPL-SCNC: 5.6 MMOL/L (ref 3.5–5.1)
PROT SERPL-MCNC: 7.3 GM/DL (ref 5.8–7.6)
PROT UR QL STRIP.AUTO: ABNORMAL
RA PRESSURE ESTIMATED: 15 MMHG
RBC # BLD AUTO: 3.63 X10(6)/MCL (ref 4.2–5.4)
RBC #/AREA URNS AUTO: ABNORMAL /HPF
RBC UR QL AUTO: ABNORMAL
RIGHT VENTRICULAR END-DIASTOLIC DIMENSION: 3.11 CM
RV TB RVSP: 18 MMHG
SODIUM SERPL-SCNC: 135 MMOL/L (ref 136–145)
SP GR UR STRIP.AUTO: 1.02 (ref 1–1.03)
SQUAMOUS #/AREA URNS LPF: ABNORMAL /HPF
TDI LATERAL: 0.09 M/S
TDI SEPTAL: 0.06 M/S
TDI: 0.08 M/S
TR MAX PG: 36 MMHG
TROPONIN I SERPL-MCNC: 0.03 NG/ML (ref 0–0.04)
TROPONIN I SERPL-MCNC: 0.04 NG/ML (ref 0–0.04)
TROPONIN I SERPL-MCNC: 0.04 NG/ML (ref 0–0.04)
TV REST PULMONARY ARTERY PRESSURE: 51 MMHG
UROBILINOGEN UR STRIP-ACNC: NORMAL
WBC # SPEC AUTO: 9.55 X10(3)/MCL (ref 4.5–11.5)
WBC #/AREA URNS AUTO: ABNORMAL /HPF
Z-SCORE OF LEFT VENTRICULAR DIMENSION IN END DIASTOLE: -0.9
Z-SCORE OF LEFT VENTRICULAR DIMENSION IN END SYSTOLE: 1.56

## 2023-10-09 PROCEDURE — 84484 ASSAY OF TROPONIN QUANT: CPT

## 2023-10-09 PROCEDURE — 63600175 PHARM REV CODE 636 W HCPCS: Performed by: STUDENT IN AN ORGANIZED HEALTH CARE EDUCATION/TRAINING PROGRAM

## 2023-10-09 PROCEDURE — 25000003 PHARM REV CODE 250

## 2023-10-09 PROCEDURE — 97166 OT EVAL MOD COMPLEX 45 MIN: CPT

## 2023-10-09 PROCEDURE — 80053 COMPREHEN METABOLIC PANEL: CPT

## 2023-10-09 PROCEDURE — 83036 HEMOGLOBIN GLYCOSYLATED A1C: CPT

## 2023-10-09 PROCEDURE — 93005 ELECTROCARDIOGRAM TRACING: CPT

## 2023-10-09 PROCEDURE — 25000003 PHARM REV CODE 250: Performed by: STUDENT IN AN ORGANIZED HEALTH CARE EDUCATION/TRAINING PROGRAM

## 2023-10-09 PROCEDURE — 83735 ASSAY OF MAGNESIUM: CPT

## 2023-10-09 PROCEDURE — 97162 PT EVAL MOD COMPLEX 30 MIN: CPT

## 2023-10-09 PROCEDURE — 21400001 HC TELEMETRY ROOM

## 2023-10-09 PROCEDURE — 84100 ASSAY OF PHOSPHORUS: CPT

## 2023-10-09 PROCEDURE — 80307 DRUG TEST PRSMV CHEM ANLYZR: CPT | Performed by: EMERGENCY MEDICINE

## 2023-10-09 PROCEDURE — 81001 URINALYSIS AUTO W/SCOPE: CPT | Performed by: EMERGENCY MEDICINE

## 2023-10-09 PROCEDURE — 85025 COMPLETE CBC W/AUTO DIFF WBC: CPT

## 2023-10-09 RX ORDER — METOPROLOL TARTRATE 50 MG/1
50 TABLET ORAL ONCE
Status: COMPLETED | OUTPATIENT
Start: 2023-10-09 | End: 2023-10-09

## 2023-10-09 RX ADMIN — METOPROLOL TARTRATE 5 MG: 1 INJECTION, SOLUTION INTRAVENOUS at 02:10

## 2023-10-09 RX ADMIN — DIVALPROEX SODIUM 250 MG: 250 TABLET, DELAYED RELEASE ORAL at 08:10

## 2023-10-09 RX ADMIN — Medication 6 MG: at 03:10

## 2023-10-09 RX ADMIN — METOPROLOL TARTRATE 50 MG: 50 TABLET, FILM COATED ORAL at 12:10

## 2023-10-09 RX ADMIN — METOPROLOL TARTRATE 5 MG: 1 INJECTION, SOLUTION INTRAVENOUS at 03:10

## 2023-10-09 RX ADMIN — Medication 6 MG: at 08:10

## 2023-10-09 RX ADMIN — DEXTROSE MONOHYDRATE 250 ML: 100 INJECTION, SOLUTION INTRAVENOUS at 09:10

## 2023-10-09 RX ADMIN — RIVAROXABAN 15 MG: 15 TABLET, FILM COATED ORAL at 05:10

## 2023-10-09 RX ADMIN — DILTIAZEM HYDROCHLORIDE 180 MG: 180 CAPSULE, COATED, EXTENDED RELEASE ORAL at 08:10

## 2023-10-09 RX ADMIN — HUMAN INSULIN 10 UNITS: 100 INJECTION, SOLUTION SUBCUTANEOUS at 08:10

## 2023-10-09 RX ADMIN — AMIODARONE HYDROCHLORIDE 200 MG: 200 TABLET ORAL at 08:10

## 2023-10-09 RX ADMIN — RIVAROXABAN 15 MG: 15 TABLET, FILM COATED ORAL at 12:10

## 2023-10-09 RX ADMIN — SODIUM ZIRCONIUM CYCLOSILICATE 10 G: 10 POWDER, FOR SUSPENSION ORAL at 08:10

## 2023-10-09 RX ADMIN — LEVOTHYROXINE SODIUM 25 MCG: 0.03 TABLET ORAL at 06:10

## 2023-10-09 RX ADMIN — ARIPIPRAZOLE 2 MG: 2 TABLET ORAL at 08:10

## 2023-10-09 RX ADMIN — METOPROLOL SUCCINATE 50 MG: 50 TABLET, EXTENDED RELEASE ORAL at 08:10

## 2023-10-09 RX ADMIN — METOPROLOL TARTRATE 5 MG: 1 INJECTION, SOLUTION INTRAVENOUS at 01:10

## 2023-10-09 RX ADMIN — ATORVASTATIN CALCIUM 20 MG: 20 TABLET, FILM COATED ORAL at 08:10

## 2023-10-09 NOTE — PT/OT/SLP EVAL
Occupational Therapy   Evaluation    Name: Alejandra Strong  MRN: [id number]  ED due to: auditory hallucinations, increased anxiety  Admitting Diagnosis: afib with RVR, bipolar/anxiety/psychosis (PEC'd), L ankle contusion  Med hx: bipolar, DM, afib, CHF EF 25%, CKD  Recent Surgery: * No surgery found *      Recommendations:     Discharge Recommendations: pt is PEC'd, dc plan for psychiatric hospital   Discharge Equipment Recommendations:   (pending progress possible RW versus cane)  Barriers to discharge:       Assessment:     Alejandra Strong is a 63 y.o. female with a medical diagnosis of afib with RVR, bipolar/anxiety/psychosis (PEC'd), L ankle contusion.  She presents with poor safety awareness, impulsive, decreased balance. Performance deficits affecting function: impaired self care skills, gait instability, impaired balance, decreased safety awareness.      Rehab Prognosis: Good; patient would benefit from acute skilled OT services to address these deficits and reach maximum level of function.       Plan:     Patient to be seen 3 x/week to address the above listed problems via self-care/home management, therapeutic activities, therapeutic exercises  Plan of Care Expires:    Plan of Care Reviewed with: patient    Subjective     Chief Complaint:   Patient/Family Comments/goals: return to PLOF    Occupational Profile:  Living Environment: pt stated resides at homeless shelter   Previous level of function: pt req'd multiple redirection to remain on tasks with PLOF questions, stated supervision with ADL tasks, ambulatory without AD  Roles and Routines:   Equipment Used at Home: none  Assistance upon Discharge: unknown    Pain/Comfort:  Pain Rating 1: 0/10    Patients cultural, spiritual, Caodaism conflicts given the current situation:      Objective:     Communicated with: nurse prior to session.  Patient found HOB elevated with telemetry upon OT entry to room.    General Precautions: Standard,  fall  Orthopedic Precautions:    Braces:    Respiratory Status: Room air  HR at rest: 111-124 on tele  With vital sign machine /78   HR with activity on tele: 120-150's    Occupational Performance:    Bed Mobility:    Patient completed Rolling/Turning to Right with contact guard assistance  Patient completed Scooting/Bridging with contact guard assistance  Patient completed Supine to Sit with contact guard assistance  Patient completed Sit to Supine with contact guard assistance    Functional Mobility/Transfers:  Patient completed Sit <> Stand Transfer with minimum assistance  with  hand-held assist   Patient completed Toilet Transfer Step Transfer technique with minimum assistance with  hand-held assist and grab bars  Functional Mobility: pt ambulated bed>toilet agustin 15ft without AD with poor safety awareness, LOB, impulsiveness min A                                        pt ambulated toilet>EOB with RW CGA , agustin 15ft    Activities of Daily Living:  Lower Body Dressing: moderate assistance donning brief  Toileting: moderate assistance ; +BM loose stools, urgency accident on floor and on chucks in bed, mod A pericleaning    Cognitive/Visual Perceptual:  Cognitive/Psychosocial Skills:     -       Follows Commands/attention:Follows two-step commands  -       Safety awareness/insight to disability: impaired   -       Mood/Affect/Coping skills/emotional control: Cooperative and anxious    Physical Exam:  Balance: -       standing balance min A  Upper Extremity Strength:    -       Right Upper Extremity: WNL  -       Left Upper Extremity: WNL    AMPAC 6 Click ADL:  AMPAC Total Score:      Treatment & Education:  Education on OT POC, fall safety    Patient left HOB elevated with all lines intact and call button in reach, 1:1    GOALS:   Multidisciplinary Problems       Occupational Therapy Goals          Problem: Occupational Therapy    Goal Priority Disciplines Outcome Interventions   Occupational Therapy  Goal     OT, PT/OT Ongoing, Progressing    Description: Pt will ambulate to bathroom and complete toilet t/f with least restrictive device mod I  Pt will complete toileting tasks mod I  Pt will complete LE dressing mod I  Pt will complete grooming in standing at sink mod I                       History:     History reviewed. No pertinent past medical history.    History reviewed. No pertinent surgical history.    Time Tracking:     OT Date of Treatment:    OT Start Time: 1029  OT Stop Time: 1050  OT Total Time (min): 21 min    Billable Minutes:Evaluation mod comp    10/9/2023

## 2023-10-09 NOTE — H&P
"[facility] H&P Note     Resident Team: [facility] Medicine List 2  Attending Physician: Jose Mayes MD      Subjective:      Brief HPI:  63 year old female with hx of HTN, DM-2, A-fib with RVR, CHF (EF 25% as of 2018), CKD, hypothyroidism, and Bipolar Disorder presents to ED on 10/8/23 for auditory hallucinations. Pt states her landlord brought her to ED for increased anxiety for the past several days. Patient denies voices telling her to hurt herself or others. She denies visual hallucinations and SI/HI. Pt reports she has been out of all medications for the past 2 weeks. She is not able to specify which medications she takes. Denies use of ETOH, tobacco, or illicit drugs.   In ED, pt was tachycardic. Initial troponin 0.047. A-fib on EKG. She was given amiodarone and metoprolol without resolution. Pt is asymptomatic. She denies active palpitations, CP, and SOB. Internal medicine consulted for admission.    Review of Systems:  ROS completed and negative except as indicated above.     Objective:     Last 24 Hour Vital Signs:  BP  Min: 105/81  Max: 134/88  Temp  Av.2 °F (36.8 °C)  Min: 98.2 °F (36.8 °C)  Max: 98.2 °F (36.8 °C)  Pulse  Av.3  Min: 102  Max: 163  Resp  Av.3  Min: 16  Max: 33  SpO2  Av.3 %  Min: 97 %  Max: 100 %  Height  Av' 3" (160 cm)  Min: 5' 3" (160 cm)  Max: 5' 3" (160 cm)  Weight  Av kg (149 lb 14.6 oz)  Min: 68 kg (149 lb 14.6 oz)  Max: 68 kg (149 lb 14.6 oz)  No intake/output data recorded.    Physical Examination:  Physical Exam  Vitals reviewed.   Constitutional:       General: She is not in acute distress.     Appearance: She is not ill-appearing.   HENT:      Head: Normocephalic and atraumatic.      Nose:      Comments: Small abrasion   Eyes:      Extraocular Movements: Extraocular movements intact.      Conjunctiva/sclera: Conjunctivae normal.   Cardiovascular:      Rate and Rhythm: Tachycardia present. Rhythm irregular.   Pulmonary:      Effort: " "Pulmonary effort is normal. No respiratory distress.      Breath sounds: Normal breath sounds. No wheezing.   Abdominal:      General: There is no distension.      Palpations: Abdomen is soft.   Musculoskeletal:         General: Normal range of motion.      Cervical back: Normal range of motion.      Comments: BLE with 2+ pitting edema    Skin:     General: Skin is warm and dry.   Neurological:      General: No focal deficit present.      Mental Status: She is alert.   Psychiatric:      Comments: Oriented to person, place, and time. + disorganized speech. Appears anxious.          Laboratory:  Most Recent Data:  CBC:   Lab Results   Component Value Date    WBC 7.20 10/08/2023    HGB 8.3 (L) 10/08/2023    HCT 30.1 (L) 10/08/2023     10/08/2023    MCV 84.8 10/08/2023    RDW 16.8 10/08/2023     WBC Differential:   Recent Labs   Lab 10/05/23  1708 10/08/23  1525   WBC 4.74 7.20   HGB 8.4* 8.3*   HCT 29.4* 30.1*    194   MCV 89.1 84.8     BMP:   Lab Results   Component Value Date     10/08/2023    K 4.6 10/08/2023    CO2 20 (L) 10/08/2023    BUN 24.4 (H) 10/08/2023    CREATININE 2.11 (H) 10/08/2023    CALCIUM 9.1 10/08/2023    MG 2.00 10/05/2023     LFTs:   Lab Results   Component Value Date    ALBUMIN 3.5 10/08/2023    BILITOT 1.0 10/08/2023    AST 29 10/08/2023    ALKPHOS 74 10/08/2023    ALT 18 10/08/2023     Coags:   Lab Results   Component Value Date    INR 1.7 (H) 12/14/2018    PROTIME 20.0 (H) 12/14/2018     FLP: No results found for: "CHOL", "HDL", "LDLCALC", "TRIG", "CHOLHDL"  DM:   Lab Results   Component Value Date    CREATININE 2.11 (H) 10/08/2023     Thyroid:   Lab Results   Component Value Date    TSH 13.631 (H) 10/05/2023     Anemia: No results found for: "IRON", "TIBC", "FERRITIN", "ZFSNOHSQ30", "FOLATE"  Cardiac:   Lab Results   Component Value Date    TROPONINI 0.047 (H) 10/08/2023       Radiology:  Imaging Results    None         Current Medications:     Infusions:       " Scheduled:   amiodarone  200 mg Oral Daily    ARIPiprazole  2 mg Oral Daily    atorvastatin  20 mg Oral Daily    diltiaZEM        diltiaZEM  180 mg Oral BID    divalproex  250 mg Oral Q12H    levothyroxine  25 mcg Oral Before breakfast    metoprolol succinate  50 mg Oral Daily    polyethylene glycol  17 g Oral Daily    rivaroxaban  20 mg Oral Daily with dinner    rivaroxaban  20 mg Oral Once        PRN:  acetaminophen, dextrose 10%, dextrose 10%, diltiaZEM, glucagon (human recombinant), glucose, glucose, insulin aspart U-100, melatonin, metoprolol, naloxone, sodium chloride 0.9%    Assessment & Plan:     A-fib with RVR  - CHADS-VASc score 5 points  -  Amiodarone, metoprolol, and diltiazem given in ED  - Continue home amio 200mg PO qd, diltiazem BID, metoprolol. Will defer to day time for titration   - Metoprolol 5mg prn for rate control   - Continue home Xarelto 20mg qhs.   - Trend trop and EKG q6h. Likely type 2 NSTEMI.   - Consider cardiology consult in am     HFrEF  - Has been on home Lasix 20mg for 1 week for LE edema. Hold for now.   - CXR ordered   - Echo ordered     HTN  - Hold home BP meds for now    DM-2, non-insulin dependent   - low-dose SSI   - A1c ordered    Hypothyroidism   - Continue home Levothyroxine 25mcg    CKD  - Creatinine likely patient's baseline. CTM     Bipolar Disorder   - Continue home Depakote and Abilify   - PEC in place  -  for psych placement  - Attempt to avoid QT prolonging medications    L Ankle Contusion  - 2/2 to fall 2 days ago. Seen in ED at time of injury and imaging was completed.   - PT/OT    CODE STATUS: Full  Access: PIV  Antibiotics: none  Diet: Diabetic  DVT Prophylaxis: Xarelto   GI Prophylaxis: none  Fluids: none      Tiffanie Eagle DO  [employer] [facility] HO-1

## 2023-10-09 NOTE — PROVIDER PROGRESS NOTES - EMERGENCY DEPT.
Encounter Date: 10/8/2023    ED Physician Progress Notes        Physician Note:   I have personally provided 35 minutes of critical care time exclusive of time spent on separately billable procedures. Time includes review of laboratory data, radiology results, discussion with consultants, and monitoring for potential decompensation. Interventions were performed as documented above.        ECG was obtained on patient at request of psychiatric facility, this showed supraventricular tachycardia at a rate of 182.  She was moved to a normal ED room and placed on cardiac monitor with defibrillator pads placed.  She denied having any chest pain or shortness of breath during this time.  She was given 6 mg of adenosine which transiently slowed her rate enough to see the underlying rhythm is atrial fibrillation.  She was then given 5 mg metoprolol IV along with IV magnesium and her heart rate minimally improved from 180s to 160s.  So the decision was made to try diltiazem 20 mg IV which seemed to be more effective and got her heart rate into the 110s and 120s.  Review of her medication record shows that she was on diltiazem, amiodarone, and metoprolol and states that she had been out of these medications for a couple of days.  She was given her normal oral dose of diltiazem and amiodarone.  Her heart rate did start to climb back up into the 140s so she was then given 25 mg of diltiazem IV.  A repeat troponin level was obtained which was elevated likely due to demand ischemia from her significantly elevated heart rate.  We will admit her to Medicine so that she could be medically cleared before transferring to psychiatric facility.  I have spoken with the patient and/or caregivers. I have explained the patient's condition, diagnoses and treatment plan based on the information available to me at this time. I have answered the patient's and/or caregiver's questions and addressed any concerns. The patient and/or caregivers have  as good an understanding of the patient's diagnosis, condition and treatment plan as can be expected at this point. The patient has been stabilized within the capability of the emergency department. The patient will be transported for further care and management or will be moved to an observation or inpatient service. I have communicated with the staff or medical practitioner taking over this patient's care.

## 2023-10-09 NOTE — PLAN OF CARE
Problem: Occupational Therapy  Goal: Occupational Therapy Goal  Description: Pt will ambulate to bathroom and complete toilet t/f with least restrictive device mod I  Pt will complete toileting tasks mod I  Pt will complete LE dressing mod I  Pt will complete grooming in standing at sink mod I  Outcome: Ongoing, Progressing

## 2023-10-09 NOTE — PLAN OF CARE
10/09/23 1148   Discharge Assessment   Assessment Type Discharge Planning Assessment   Confirmed/corrected address, phone number and insurance Yes   Confirmed Demographics Correct on Facesheet   Source of Information patient;health record   Reason For Admission Admission Dx    R00.0 Tachycardia  R79.89 Elevated troponin  I48.91 Atrial fibrillation with rapid ventricular response  R79.89 Elevated troponin I level  R07.9 Chest pain  F29 Psychosis, unspecified psychosis type   People in Home alone   Facility Arrived From: Home   Do you expect to return to your current living situation? Yes   Do you have help at home or someone to help you manage your care at home? Yes   Who are your caregiver(s) and their phone number(s)? beatriz maurer (Neighbor/Landlord)   [phone]   Prior to hospitilization cognitive status: Unable to Assess   Current cognitive status: Alert/Oriented   Home Accessibility stairs to enter home   Equipment Currently Used at Home none   Readmission within 30 days? No   Patient currently being followed by outpatient case management? No   Do you currently have service(s) that help you manage your care at home? No   Do you take prescription medications? Yes   Do you have prescription coverage? Yes   Coverage [employer] M/D   Do you have any problems affording any of your prescribed medications? No   Who is going to help you get home at discharge? [facility]acement   How do you get to doctors appointments? family or friend will provide   Are you on dialysis? No   DME Needed Upon Discharge  none   Discharge Plan discussed with: Patient   Transition of Care Barriers Mental illness   Discharge Plan A Psychiatric hospital     Pt PEC'd; Requests placement locally; Prior IP Psych Admissions; Per pt, she is legally  with 2 children whom she is estranged from; Receives SSI benefits; SO, Maxx Wright (No phone); Janelle , Ms Beatriz, provided as emergency contact; Will follow for IP Psych  placement when medically stable.

## 2023-10-09 NOTE — PT/OT/SLP PROGRESS
Physical Therapy    Missed Treatment Session    Patient Name:  Alejandra Strong   MRN:  [id number]      Patient not seen at this time secondary to Nurse/ SONAL hold, Other (Comment). Pt on RN hold due to HR being 139 BMP in supine. Pt on hold until after HR medication given.     Will follow-up as patient is available to participate and as therapists' schedule allows.

## 2023-10-09 NOTE — PROGRESS NOTES
"Inpatient Nutrition Evaluation    Admit Date: 10/8/2023   Total duration of encounter: 1 day    Nutrition Recommendation/Prescription     Continue diabetic diet; encouraged adherence to diet  Will order boost glucose control bid; Boost Glucose Control (provides 190 kcal, 16 g protein per serving)   Pt education on diet/complete  MVI/fe  Biweekly wt  Will monitor nutrition status     Nutrition Assessment     Chart Review    Reason Seen: continuous nutrition monitoring and malnutrition screening tool (MST)    Malnutrition Screening Tool Results   Have you recently lost weight without trying?: Yes: Unsure how much  Have you been eating poorly because of a decreased appetite?: Yes   MST Score: 3     Diagnosis:  Afib, CHF, HTN, DM, hypothyroid, CKD, bipolar, L ankle contusion     Relevant Medical History: HTN, DM, Afib, CHF, CKD, hypothyroid, bipolar     Nutrition-Related Medications: atorvastatin, levothyroxine     Nutrition-Related Labs:  (10-9) H/H 8.6/30.8(L) Gluc 120 Bun 28(H) Cr 2.2(H) GFR 23(L) K 5.6(H) P 4.6     Diet Order: Diet diabetic  Oral Supplement Order: none  Appetite/Oral Intake: good/% of meals  Factors Affecting Nutritional Intake: none identified  Food/Rastafari/Cultural Preferences: none reported  Food Allergies: none reported    Skin Integrity: (S) skin tear (Nose)  Wound(s):   as above    Comments    10/9: Received MST--unsure wt loss/ appetite. Pt reported not sure UBW; ate most of breakfast except potatoes--did not like them; usually eats ok. No N/V. Will order oral supplement bid for added nutrition. Pt awaiting psych placement. Abnormal labs noted: Bun/Cr (H)--hx CKD.     Anthropometrics    Height: 5' 3" (160 cm) Height Method: Stated  Last Weight: 67.6 kg (149 lb) (10/09/23 0746) Weight Method: Standard Scale  BMI (Calculated): 26.4  BMI Classification: overweight (BMI 25-29.9)     Ideal Body Weight (IBW), Female: 115 lb     % Ideal Body Weight, Female (lb): 129.57 %         Usual Body " Weight (UBW), kg:  (pt not sure of UBW)        Usual Weight Provided By: EMR weight history    Wt Readings from Last 5 Encounters:   10/09/23 67.6 kg (149 lb)   10/05/23 59.4 kg (131 lb)     Weight Change(s) Since Admission:  Admit Weight: 68 kg (149 lb 14.6 oz) (10/08/23 1429)  Pt not sure UBW    Patient Education    Education Provided: diabetic diet  Teaching Method: explanation and printed materials  Comprehension: verbalizes understanding  Barriers to Learning: acuity of illness and difficulty concentrating  Expected Compliance: fair  Comments: All questions were answered and dietitian's contact information was provided.     Monitoring & Evaluation     Dietitian will monitor food and beverage intake, parenteral nutrition intake, electrolyte/renal panel, and food/nutrition knowledge skill.  Nutrition Risk/Follow-Up: low (follow-up in 5-7 days)  Patients assigned 'low nutrition risk' status do not qualify for a full nutritional assessment but will be monitored and re-evaluated in a 5-7 day time period. Please consult if re-evaluation needed sooner.

## 2023-10-09 NOTE — PT/OT/SLP EVAL
Physical Therapy Evaluation    Patient Name:  Alejandra Strong   MRN:  [id number]    Recommendations:     Discharge Recommendations:  nursing facility, skilled   Discharge Equipment Recommendations: walker, rolling   Equipment to be obtained for discharge: rolling walker.  Barriers to discharge: fall risk, severity of deficits, level of skilled assistance required, impaired cognitive status, decreased safety awareness, time since onset, medical diagnosis, past medical history, and complicated medical history    Assessment:     Alejandra Strong is a 63 y.o. female admitted with a medical diagnosis of:   Final diagnoses:  [F29] Psychosis, unspecified psychosis type (Primary)  [I48.91] Atrial fibrillation with rapid ventricular response  [R79.89] Elevated troponin I level    Patient Active Problem List   Diagnosis    Paroxysmal atrial fibrillation    Bipolar disorder    Dyslipidemia    Essential hypertension    Hypothyroidism    Iron deficiency anemia    PAD (peripheral artery disease)       She presents with the following impairments/functional limitations:  impaired functional mobility, gait instability, impaired balance, impaired cognition, pain, impaired skin.    Rehab Prognosis: Good.    Patient would benefit from continued skilled acute PT services to: address above listed impairments/functional limitations; receive patient/caregiver education; reduce fall risk; and maximize independency/safety with functional mobility.    Recent Surgery: * No surgery found *      Pt tolerated session well, however displays increased impulsivity with out of bed activities. Pt displayed increased bruising on L anterior ankle and increased unsteadiness upon gait with and without AD. Pt performed co-eval with OT.     Plan:     During this hospitalization, patient to be seen 3 x/week to address the identified impairments/functional limitations via gait training, therapeutic activities, therapeutic exercises, neuromuscular  "re-education and progress toward the established goals.    Plan of Care Expires:  11/06/23    Subjective     Communicated with patient's nurse prior to session.    Patient agreeable to participate in evaluation.     Chief Complaint: "Pain on my L ankle."  Patient/Family Comments/goals: "I am here so that I can get some help to take care of myself."  Pain/Comfort:  Pain Rating 1: 5/10  Location - Side 1: Left  Location - Orientation 1: anterior  Location 1: ankle  Pain Addressed 1: Reposition, Distraction, Nurse notified  Pain Rating Post-Intervention 1: 0/10    Patients cultural, spiritual, Faith conflicts given the current situation: no    Social History  Living Environment: Patient  lives in homeless shelter  Functional Level: Prior to admission patient ambulated without assistive device, was independent in ADL's, and was independent in IADL's.  Equipment Used at Home: none  Equipment owned (not currently used): none.  Assistance Upon Discharge: unknown.    Objective:     Patient found supine in bed, with HOB elevated, and one on one sitter outside the door  with telemetry, peripheral IV, PureWick  upon PT entry to room.    General Precautions: Standard, fall   Orthopedic Precautions:N/A   Braces: N/A  Respiratory Status: room air    Vitals   At Rest (pre-session)  BP  114/78   HR  116-122   O2 Sat %        With Activity during session  BP     HR  120-150's   O2 Sat %       Exams:  Orientation: Patient is oriented to Person, Place, Time, Situation  Commands: Patient follows commands consistently and follows 2-step verbal commands  Gross Motor Coordination:  WFL  Sensation:    -     Intact: light/touch bilat lower extremity  Skin Integrity/Edema:      -       Skin integrity: Bruising of L anterior ankle  BILAT UE ROM/strength - defer to OT - see OT note for details  RLE ROM: WFL  RLE Strength: Deficits: 4+/5 MMT strength  LLE ROM: WFL  LLE Strength: Deficits: 4+/5 MMT strength grossly     Functional " Mobility:    Bed Mobility:  Supine to Sit: contact guard assistance (pt donned diaper in sitting EOB) (PT and OT performed assessment sitting EOB)   Sit to Supine: contact guard assistance    Transfers:  Sit to Stand: minimum assistance with no assistive device (from EOB x2 )   Bed<>toilet: minimum assistance with no assistive device to toilet and RW from toilet to bed using Step Transfer (increased impulsivity, increased unsteadiness during standing with no use of RW, verbal cueing for safety awareness)   Toilet Transfer: minimum assistance with no assistive device using Step Transfer (pt performed void on toilet and required increased time and assistance for wiping with OT, PT provided assistance for balance as needed)     Gait:  Patient ambulated 2 set of 10ft in room with no assistive device and rolling walker and minimum assistance.  Patient demonstrates unsteady gait and decreased step length. (Increased impulsivity and required verbal cueing for safety with use of RW in room)      Other Mobility:  not assessed    Balance:  Sit  Static: GOOD-: Takes MODERATE challenges from all directions but inconsistently  Dynamic: GOOD-: Incosistently Maintains balance through MODERATE excursions of active trunk movement,     Stand  Static: POOR+: Needs MINIMAL assist to maintain  Dynamic: POOR+: Needs MIN (minimal ) assist during gait    Patient left supine in bed and with HOB elevated with all lines intact, call button in reach, RN notified, and Pt's one on on sitter present.    Education     Patient was instructed to utilize staff assistance for mobility/transfers.  White board updated regarding patient's safest level of mobility with staff assistance.    Goals     Multidisciplinary Problems       Physical Therapy Goals          Problem: Physical Therapy    Goal Priority Disciplines Outcome Goal Variances Interventions   Physical Therapy Goal     PT, PT/OT Ongoing, Progressing     Description: Goals to be met by:  Discharge     Patient will increase functional independence with mobility by performing:    -. Sit to stand transfer with Modified [place] with use of RW.   -. Bed to chair transfer with Modified [place] using Rolling Walker  -. Gait  x 390 feet with Modified [place] using Rolling Walker.                        History:   History reviewed. No pertinent past medical history.  History reviewed. No pertinent surgical history.  Time Tracking:     PT Received On: 10/09/23  PT Start Time: 1030     PT Stop Time: 1052  PT Total Time (min): 22 min     Billable Minutes: Evaluation 22    10/09/2023

## 2023-10-10 PROBLEM — R07.9 CHEST PAIN: Status: ACTIVE | Noted: 2023-10-10

## 2023-10-10 PROBLEM — R79.89 ELEVATED TROPONIN I LEVEL: Status: ACTIVE | Noted: 2023-10-10

## 2023-10-10 PROBLEM — R00.0 TACHYCARDIA: Status: ACTIVE | Noted: 2023-10-10

## 2023-10-10 PROBLEM — I48.91 ATRIAL FIBRILLATION WITH RAPID VENTRICULAR RESPONSE: Status: ACTIVE | Noted: 2023-10-10

## 2023-10-10 LAB
ALBUMIN SERPL-MCNC: 3.2 G/DL (ref 3.4–4.8)
ALBUMIN/GLOB SERPL: 0.7 RATIO (ref 1.1–2)
ALP SERPL-CCNC: 77 UNIT/L (ref 40–150)
ALT SERPL-CCNC: 20 UNIT/L (ref 0–55)
AST SERPL-CCNC: 27 UNIT/L (ref 5–34)
BASOPHILS # BLD AUTO: 0.15 X10(3)/MCL
BASOPHILS NFR BLD AUTO: 1.3 %
BILIRUB SERPL-MCNC: 0.9 MG/DL
BNP BLD-MCNC: 1993.2 PG/ML
BUN SERPL-MCNC: 38.2 MG/DL (ref 9.8–20.1)
CALCIUM SERPL-MCNC: 9.1 MG/DL (ref 8.4–10.2)
CHLORIDE SERPL-SCNC: 105 MMOL/L (ref 98–107)
CO2 SERPL-SCNC: 19 MMOL/L (ref 23–31)
CREAT SERPL-MCNC: 2.47 MG/DL (ref 0.55–1.02)
EOSINOPHIL # BLD AUTO: 0.22 X10(3)/MCL (ref 0–0.9)
EOSINOPHIL NFR BLD AUTO: 1.9 %
ERYTHROCYTE [DISTWIDTH] IN BLOOD BY AUTOMATED COUNT: 17.2 % (ref 11.5–17)
GFR SERPLBLD CREATININE-BSD FMLA CKD-EPI: 21 MLS/MIN/1.73/M2
GLOBULIN SER-MCNC: 4.6 GM/DL (ref 2.4–3.5)
GLUCOSE SERPL-MCNC: 139 MG/DL (ref 82–115)
HCT VFR BLD AUTO: 32 % (ref 37–47)
HGB BLD-MCNC: 9.1 G/DL (ref 12–16)
IMM GRANULOCYTES # BLD AUTO: 0.05 X10(3)/MCL (ref 0–0.04)
IMM GRANULOCYTES NFR BLD AUTO: 0.4 %
LYMPHOCYTES # BLD AUTO: 1.68 X10(3)/MCL (ref 0.6–4.6)
LYMPHOCYTES NFR BLD AUTO: 14.8 %
MAGNESIUM SERPL-MCNC: 2.9 MG/DL (ref 1.6–2.6)
MCH RBC QN AUTO: 23.8 PG (ref 27–31)
MCHC RBC AUTO-ENTMCNC: 28.4 G/DL (ref 33–36)
MCV RBC AUTO: 83.6 FL (ref 80–94)
MONOCYTES # BLD AUTO: 0.85 X10(3)/MCL (ref 0.1–1.3)
MONOCYTES NFR BLD AUTO: 7.5 %
NEUTROPHILS # BLD AUTO: 8.43 X10(3)/MCL (ref 2.1–9.2)
NEUTROPHILS NFR BLD AUTO: 74.1 %
NRBC BLD AUTO-RTO: 0 %
PHOSPHATE SERPL-MCNC: 4.6 MG/DL (ref 2.3–4.7)
PLATELET # BLD AUTO: 284 X10(3)/MCL (ref 130–400)
PMV BLD AUTO: 11.9 FL (ref 7.4–10.4)
POCT GLUCOSE: 141 MG/DL (ref 70–110)
POCT GLUCOSE: 167 MG/DL (ref 70–110)
POCT GLUCOSE: 174 MG/DL (ref 70–110)
POCT GLUCOSE: 187 MG/DL (ref 70–110)
POTASSIUM SERPL-SCNC: 5 MMOL/L (ref 3.5–5.1)
PROT SERPL-MCNC: 7.8 GM/DL (ref 5.8–7.6)
RBC # BLD AUTO: 3.83 X10(6)/MCL (ref 4.2–5.4)
SODIUM SERPL-SCNC: 133 MMOL/L (ref 136–145)
WBC # SPEC AUTO: 11.38 X10(3)/MCL (ref 4.5–11.5)

## 2023-10-10 PROCEDURE — 25000003 PHARM REV CODE 250: Performed by: STUDENT IN AN ORGANIZED HEALTH CARE EDUCATION/TRAINING PROGRAM

## 2023-10-10 PROCEDURE — 63600175 PHARM REV CODE 636 W HCPCS: Performed by: STUDENT IN AN ORGANIZED HEALTH CARE EDUCATION/TRAINING PROGRAM

## 2023-10-10 PROCEDURE — 25000003 PHARM REV CODE 250

## 2023-10-10 PROCEDURE — 83880 ASSAY OF NATRIURETIC PEPTIDE: CPT | Performed by: STUDENT IN AN ORGANIZED HEALTH CARE EDUCATION/TRAINING PROGRAM

## 2023-10-10 PROCEDURE — 84100 ASSAY OF PHOSPHORUS: CPT

## 2023-10-10 PROCEDURE — 21400001 HC TELEMETRY ROOM

## 2023-10-10 PROCEDURE — 85025 COMPLETE CBC W/AUTO DIFF WBC: CPT

## 2023-10-10 PROCEDURE — 97116 GAIT TRAINING THERAPY: CPT

## 2023-10-10 PROCEDURE — 80053 COMPREHEN METABOLIC PANEL: CPT

## 2023-10-10 PROCEDURE — 83735 ASSAY OF MAGNESIUM: CPT

## 2023-10-10 PROCEDURE — 97535 SELF CARE MNGMENT TRAINING: CPT

## 2023-10-10 RX ORDER — CARVEDILOL 3.12 MG/1
6.25 TABLET ORAL 2 TIMES DAILY
Status: DISCONTINUED | OUTPATIENT
Start: 2023-10-10 | End: 2023-10-11 | Stop reason: HOSPADM

## 2023-10-10 RX ORDER — LORAZEPAM 2 MG/ML
1 INJECTION INTRAMUSCULAR ONCE
Status: COMPLETED | OUTPATIENT
Start: 2023-10-10 | End: 2023-10-10

## 2023-10-10 RX ORDER — TRAZODONE HYDROCHLORIDE 50 MG/1
100 TABLET ORAL NIGHTLY PRN
Status: DISCONTINUED | OUTPATIENT
Start: 2023-10-10 | End: 2023-10-11

## 2023-10-10 RX ORDER — METOPROLOL SUCCINATE 50 MG/1
100 TABLET, EXTENDED RELEASE ORAL DAILY
Status: DISCONTINUED | OUTPATIENT
Start: 2023-10-10 | End: 2023-10-10

## 2023-10-10 RX ADMIN — DIVALPROEX SODIUM 250 MG: 250 TABLET, DELAYED RELEASE ORAL at 08:10

## 2023-10-10 RX ADMIN — RIVAROXABAN 15 MG: 15 TABLET, FILM COATED ORAL at 05:10

## 2023-10-10 RX ADMIN — DILTIAZEM HYDROCHLORIDE 180 MG: 180 CAPSULE, COATED, EXTENDED RELEASE ORAL at 08:10

## 2023-10-10 RX ADMIN — LORAZEPAM 1 MG: 2 INJECTION INTRAMUSCULAR; INTRAVENOUS at 11:10

## 2023-10-10 RX ADMIN — ACETAMINOPHEN 650 MG: 325 TABLET, FILM COATED ORAL at 08:10

## 2023-10-10 RX ADMIN — LEVOTHYROXINE SODIUM 25 MCG: 0.03 TABLET ORAL at 06:10

## 2023-10-10 RX ADMIN — Medication 6 MG: at 08:10

## 2023-10-10 RX ADMIN — TRAZODONE HYDROCHLORIDE 100 MG: 50 TABLET ORAL at 10:10

## 2023-10-10 RX ADMIN — ARIPIPRAZOLE 2 MG: 2 TABLET ORAL at 08:10

## 2023-10-10 RX ADMIN — AMIODARONE HYDROCHLORIDE 200 MG: 200 TABLET ORAL at 08:10

## 2023-10-10 RX ADMIN — METOPROLOL SUCCINATE 100 MG: 50 TABLET, EXTENDED RELEASE ORAL at 08:10

## 2023-10-10 RX ADMIN — ATORVASTATIN CALCIUM 20 MG: 20 TABLET, FILM COATED ORAL at 08:10

## 2023-10-10 RX ADMIN — CARVEDILOL 6.25 MG: 3.12 TABLET, FILM COATED ORAL at 08:10

## 2023-10-10 NOTE — PT/OT/SLP PROGRESS
Physical Therapy Treatment    Patient Name:  Alejandra Strong   MRN:  [id number]    Recommendations     Discharge Recommendations:  nursing facility, skilled   Discharge Equipment Recommendations: walker, rolling   Barriers to discharge: fall risk, severity of deficits, level of skilled assistance required, decreased endurance, impaired cognitive status, decreased safety awareness, decreased caregiver support, limited family support, time since onset, medical diagnosis, past medical history, and complicated medical history    Assessment     Alejandra Strong is a 63 y.o. female admitted with a medical diagnosis of:  Final diagnoses:  [F29] Psychosis, unspecified psychosis type (Primary)  [I48.91] Atrial fibrillation with rapid ventricular response  [R79.89] Elevated troponin I level    Patient Active Problem List   Diagnosis    Paroxysmal atrial fibrillation    Bipolar disorder    Dyslipidemia    Essential hypertension    Hypothyroidism    Iron deficiency anemia    PAD (peripheral artery disease)    Atrial fibrillation with rapid ventricular response    Chest pain    Elevated troponin I level    Tachycardia       She presents with the following impairments/functional limitations:  weakness, impaired endurance, impaired functional mobility, gait instability, impaired balance, impaired cognition, decreased safety awareness.    Rehab Prognosis: Fair.    Patient would benefit from continued skilled acute PT services to: address above listed impairments/functional limitations; receive patient/caregiver education; reduce fall risk; and maximize independency/safety with functional mobility.    Recent Surgery: * No surgery found *      Pt tolerated session well today, however continued to scratch at sore on her L elbow and perseverate on going to a psych facility to get help. Pt continues to display increased impulsivity and decreased safety awareness with use of RW and required increased verbal cueing for safety.  "    Plan     During this hospitalization, patient to be seen 3 x/week to address the identified impairments/functional limitations via gait training, therapeutic activities, therapeutic exercises, neuromuscular re-education and progress toward the established goals.    Plan of Care Expires:  11/06/23    Subjective     Communicated with patient's nurse prior to session.    Patient agreeable to participate in treatment session.    Chief Complaint: "I need to go a psychiatric hospital so that I can get better."  Patient/Family Comments/goals: none noted  Pain/Comfort:  Pain Rating 1: 0/10  Pain Rating Post-Intervention 1: 0/10    Objective     Patient found supine in bed and with HOB elevated with 1 on 1 sitter in room with peripheral IV, telemetry  upon PT entry to room.    General Precautions: Standard, fall   Orthopedic Precautions:N/A   Braces:    Respiratory Status: room air    Functional Mobility:    Bed Mobility:  Supine to Sit: stand by assistance  Sit to Supine: stand by assistance    Transfers:  Sit to Stand: stand by assistance with rolling walker (from EOB and x2 times from chair) (displayed increased VITOR and increased impulsivity)     Gait:  Patient ambulated 30ft, 110ft, and 50ft with rolling walker and minimum assistance. (Increased tactile cueing to keep RW on floor, verbal cueing to avoid obstacles and keep RW close during gait, increased seated rest break due to increased fatigue and mild increased dizziness) (pt's HR from 120's-140's throughout gait)   Patient demonstrates occasional unsteady gait, decreased step length, narrow base of support, and ambulates outside VITOR of RW. (Increased gait speed)     Other Mobility:  not assessed    Patient left supine in bed and with HOB elevated with all lines intact, call button in reach, RN notified, and Pt's 1 on 1 sitter present.    Goals     Multidisciplinary Problems       Physical Therapy Goals          Problem: Physical Therapy    Goal Priority " Disciplines Outcome Goal Variances Interventions   Physical Therapy Goal     PT, PT/OT Ongoing, Progressing     Description: Goals to be met by: Discharge     Patient will increase functional independence with mobility by performing:    -. Sit to stand transfer with Modified [place] with use of RW.   -. Bed to chair transfer with Modified [place] using Rolling Walker  -. Gait  x 390 feet with Modified [place] using Rolling Walker.                        Time Tracking     PT Received On: 10/10/23  PT Start Time: 0803     PT Stop Time: 0815  PT Total Time (min): 12 min     Billable Minutes: Gait Training 12    10/10/2023

## 2023-10-10 NOTE — PT/OT/SLP PROGRESS
"Occupational Therapy   Treatment    Name: Alejandra Strong  MRN: [id number]  Admitting Diagnosis:      afib with RVR, bipolar/anxiety/psychosis (PEC'd), L ankle contusion    Recommendations:     Discharge Recommendations: nursing facility, skilled  Discharge Equipment Recommendations:   (pending progress possible RW versus cane)  Barriers to discharge:       Assessment:     Alejandra Strong is a 63 y.o. female with a medical diagnosis of afib with RVR, bipolar/anxiety/psychosis (PEC'd), L ankle contusion.  She presents with anxiety, disoriented to situation, decreased balance and safety awareness. Performance deficits affecting function are impaired self care skills, gait instability, impaired balance, decreased safety awareness.     Rehab Prognosis:  Good; patient would benefit from acute skilled OT services to address these deficits and reach maximum level of function.       Plan:     Patient to be seen 3 x/week to address the above listed problems via self-care/home management, therapeutic activities, therapeutic exercises  Plan of Care Expires:    Plan of Care Reviewed with: patient    Subjective     Chief Complaint: "can you call my landlord. I don't want her to be mad at me" pt fixated on this topic  Patient/Family Comments/goals:   Pain/Comfort:  Pain Rating 1: 0/10    Objective:     Communicated with: nurse prior to session.  Patient found HOB elevated with telemetry (1:1) upon OT entry to room.    General Precautions: Standard, fall    Orthopedic Precautions:   Braces:    Respiratory Status: Room air     Occupational Performance:     Bed Mobility:    Patient completed Rolling/Turning to Right with modified independence  Patient completed Scooting/Bridging with modified independence  Patient completed Supine to Sit with modified independence  Patient completed Sit to Supine with modified independence     Functional Mobility/Transfers:  Patient completed Sit <> Stand Transfer with contact guard assistance "  with  no assistive device   Functional Mobility: pt able to ambulate in room without AD CGA for balance    Activities of Daily Living:  Grooming: brushed teeth standing at sink CGA OT combed pt's hair total A due to matted, pt tolerated standing x 2 minutes, then c/o fatigue, completed in seated position, pt increased anxiety during session changing positions often  Lower Body Dressing: pt able to doff/chidi B socks supervision sitting EOB; pt able to make adjustments to ill fitting brief min A in standing        AMPAC 6 Click ADL:      Treatment & Education:  Pt req'd redirection throughout session to remain in room    Patient left HOB elevated with  1:1 present    GOALS:   Multidisciplinary Problems       Occupational Therapy Goals          Problem: Occupational Therapy    Goal Priority Disciplines Outcome Interventions   Occupational Therapy Goal     OT, PT/OT Ongoing, Progressing    Description: Pt will ambulate to bathroom and complete toilet t/f with least restrictive device mod I  Pt will complete toileting tasks mod I  Pt will complete LE dressing mod I  Pt will complete grooming in standing at sink mod I                       Time Tracking:     OT Date of Treatment: 10/10/23  OT Start Time: 1019  OT Stop Time: 1043  OT Total Time (min): 24 min    Billable Minutes:Self Care/Home Management 2 units    OT/JIE: OT     Number of JIE visits since last OT visit: 0    10/10/2023

## 2023-10-10 NOTE — PROGRESS NOTES
[facility] Medicine Wards Progress Note     Resident Team: [facility] Medicine List 2  Attending Physician: Jose Mayes MD  Resident: Dolores  Intern: Delbert       Subjective:      Brief HPI:  63 year old female with hx of HTN, DM-2, A-fib with RVR, CHF (EF 25% as of 2018), CKD, hypothyroidism, and Bipolar Disorder presents to ED on 10/8/23 for auditory hallucinations. Pt states her landlord brought her to ED for increased anxiety for the past several days. Patient denies voices telling her to hurt herself or others. She denies visual hallucinations and SI/HI. Pt reports she has been out of all medications for the past 2 weeks. She is not able to specify which medications she takes. Denies use of ETOH, tobacco, or illicit drugs.   In ED, pt was tachycardic. Initial troponin 0.047. A-fib on EKG. She was given amiodarone and metoprolol without resolution. Pt is asymptomatic. She denies active palpitations, CP, and SOB. Internal medicine consulted for admission.    Interval History: No acute event overnight. Patient was agitated last night and could not sleep. Per nurse, no visual, tactile, auditory hallucination. Vitals stable this morning.      Review of Systems:  ROS completed and negative except as indicated above.     Objective:     Last 24 Hour Vital Signs:  BP  Min: 93/63  Max: 112/66  Temp  Av.8 °F (36.6 °C)  Min: 97.5 °F (36.4 °C)  Max: 98 °F (36.7 °C)  Pulse  Av.8  Min: 105  Max: 121  Resp  Av  Min: 20  Max: 20  SpO2  Av.4 %  Min: 96 %  Max: 100 %    I/O last 3 completed shifts:  In: 1096.2 [I.V.:96.2; IV Piggyback:1000]  Out: 250 [Urine:250]      Physical Examination:  Vitals reviewed.   Constitutional:       General: She is not in acute distress.     Appearance: She is not ill-appearing.   HENT:      Head: Normocephalic and atraumatic.      Nose:      Comments: Small abrasion   Eyes:      Extraocular Movements: Extraocular movements intact.      Conjunctiva/sclera: Conjunctivae normal.    Cardiovascular:      Rate and Rhythm: Tachycardia present. Rhythm irregular.   Pulmonary:      Effort: Pulmonary effort is normal. No respiratory distress.      Breath sounds: Normal breath sounds. No wheezing.   Abdominal:      General: There is no distension.      Palpations: Abdomen is soft.   Musculoskeletal:         General: Normal range of motion.      Cervical back: Normal range of motion.      Comments: BLE with 1+ pitting edema    Skin:     General: Skin is warm and dry.   Neurological:      General: No focal deficit present.      Mental Status: She is alert.   Psychiatric:      Comments: Not oriented to person, place, and time. + disorganized speech. Appears anxious and agitated.          Laboratory:  Most Recent Data:  CBC:   Lab Results   Component Value Date    WBC 11.38 10/10/2023    HGB 9.1 (L) 10/10/2023    HCT 32.0 (L) 10/10/2023     10/10/2023     Recent Results (from the past 24 hour(s))   Echo    Collection Time: 10/09/23  8:50 AM   Result Value Ref Range    BSA 1.73 m2    LVOT stroke volume 34.90 cm3    LVIDd 4.34 3.5 - 6.0 cm    LV Systolic Volume 53.86 mL    LV Systolic Volume Index 31.5 mL/m2    LVIDs 3.58 2.1 - 4.0 cm    LV Diastolic Volume 84.71 mL    LV Diastolic Volume Index 49.54 mL/m2    IVS 1.48 (A) 0.6 - 1.1 cm    LVOT diameter 2.13 cm    LVOT area 3.6 cm2    FS 18 (A) 28 - 44 %    Left Ventricle Relative Wall Thickness 0.52 cm    Posterior Wall 1.13 (A) 0.6 - 1.1 cm    LV mass 211.89 g    LV Mass Index 124 g/m2    MV Peak E Kaden 0.88 m/s    TDI LATERAL 0.09 m/s    TDI SEPTAL 0.06 m/s    E/E' ratio 11.73 m/s    MV Peak A Kaden 0.51 m/s    TR Max Kaden 2.99 m/s    E/A ratio 1.73     E wave deceleration time 58.22 msec    LV SEPTAL E/E' RATIO 14.67 m/s    LV LATERAL E/E' RATIO 9.78 m/s    LVOT peak kaden 0.65 m/s    Left Ventricular Outflow Tract Mean Velocity 0.45 cm/s    Left Ventricular Outflow Tract Mean Gradient 0.95 mmHg    LA size 4.75 cm    RVDD 3.11 cm    AV mean gradient 3  mmHg    AV peak gradient 5 mmHg    Ao peak carlos 1.15 m/s    Ao VTI 15.30 cm    LVOT peak VTI 9.80 cm    AV valve area 2.28 cm²    AV Velocity Ratio 0.57     AV index (prosthetic) 0.64     SIDNEY by Velocity Ratio 2.01 cm²    MV mean gradient 2 mmHg    MV peak gradient 5 mmHg    MV stenosis pressure 1/2 time 16.88 ms    MV valve area p 1/2 method 13.03 cm2    MV valve area by continuity eq 2.08 cm2    MV VTI 16.8 cm    Triscuspid Valve Regurgitation Peak Gradient 36 mmHg    Mean e' 0.08 m/s    ZLVIDS 1.56     ZLVIDD -0.90     TV resting pulmonary artery pressure 51 mmHg    RV TB RVSP 18 mmHg    Est. RA pres 15 mmHg   POCT glucose    Collection Time: 10/09/23 12:43 PM   Result Value Ref Range    POCT Glucose 166 (H) 70 - 110 mg/dL   Troponin I    Collection Time: 10/09/23  1:50 PM   Result Value Ref Range    Troponin-I 0.036 0.000 - 0.045 ng/mL   POCT glucose    Collection Time: 10/09/23  6:22 PM   Result Value Ref Range    POCT Glucose 164 (H) 70 - 110 mg/dL   POCT glucose    Collection Time: 10/09/23 11:17 PM   Result Value Ref Range    POCT Glucose 184 (H) 70 - 110 mg/dL   Comprehensive Metabolic Panel (CMP)    Collection Time: 10/10/23  4:03 AM   Result Value Ref Range    Sodium Level 133 (L) 136 - 145 mmol/L    Potassium Level 5.0 3.5 - 5.1 mmol/L    Chloride 105 98 - 107 mmol/L    Carbon Dioxide 19 (L) 23 - 31 mmol/L    Glucose Level 139 (H) 82 - 115 mg/dL    Blood Urea Nitrogen 38.2 (H) 9.8 - 20.1 mg/dL    Creatinine 2.47 (H) 0.55 - 1.02 mg/dL    Calcium Level Total 9.1 8.4 - 10.2 mg/dL    Protein Total 7.8 (H) 5.8 - 7.6 gm/dL    Albumin Level 3.2 (L) 3.4 - 4.8 g/dL    Globulin 4.6 (H) 2.4 - 3.5 gm/dL    Albumin/Globulin Ratio 0.7 (L) 1.1 - 2.0 ratio    Bilirubin Total 0.9 <=1.5 mg/dL    Alkaline Phosphatase 77 40 - 150 unit/L    Alanine Aminotransferase 20 0 - 55 unit/L    Aspartate Aminotransferase 27 5 - 34 unit/L    eGFR 21 mls/min/1.73/m2   Magnesium    Collection Time: 10/10/23  4:03 AM   Result Value Ref  Range    Magnesium Level 2.90 (H) 1.60 - 2.60 mg/dL   Phosphorus    Collection Time: 10/10/23  4:03 AM   Result Value Ref Range    Phosphorus Level 4.6 2.3 - 4.7 mg/dL   BNP    Collection Time: 10/10/23  4:03 AM   Result Value Ref Range    Natriuretic Peptide 1,993.2 (H) <=100.0 pg/mL   CBC with Differential    Collection Time: 10/10/23  4:03 AM   Result Value Ref Range    WBC 11.38 4.50 - 11.50 x10(3)/mcL    RBC 3.83 (L) 4.20 - 5.40 x10(6)/mcL    Hgb 9.1 (L) 12.0 - 16.0 g/dL    Hct 32.0 (L) 37.0 - 47.0 %    MCV 83.6 80.0 - 94.0 fL    MCH 23.8 (L) 27.0 - 31.0 pg    MCHC 28.4 (L) 33.0 - 36.0 g/dL    RDW 17.2 (H) 11.5 - 17.0 %    Platelet 284 130 - 400 x10(3)/mcL    MPV 11.9 (H) 7.4 - 10.4 fL    Neut % 74.1 %    Lymph % 14.8 %    Mono % 7.5 %    Eos % 1.9 %    Basophil % 1.3 %    Lymph # 1.68 0.6 - 4.6 x10(3)/mcL    Neut # 8.43 2.1 - 9.2 x10(3)/mcL    Mono # 0.85 0.1 - 1.3 x10(3)/mcL    Eos # 0.22 0 - 0.9 x10(3)/mcL    Baso # 0.15 <=0.2 x10(3)/mcL    IG# 0.05 (H) 0 - 0.04 x10(3)/mcL    IG% 0.4 %    NRBC% 0.0 %   POCT glucose    Collection Time: 10/10/23  6:15 AM   Result Value Ref Range    POCT Glucose 187 (H) 70 - 110 mg/dL       Cardiac:   Lab Results   Component Value Date    TROPONINI 0.036 10/09/2023    BNP 1,993.2 (H) 10/10/2023       Microbiology Data:  Microbiology Results (last 7 days)       ** No results found for the last 168 hours. **          Other Results:  EKG    Results for orders placed or performed during the hospital encounter of 10/08/23   EKG 12-lead    Collection Time: 10/09/23  2:46 PM    Narrative    Test Reason :     Vent. Rate : 102 BPM     Atrial Rate : 122 BPM     P-R Int : 000 ms          QRS Dur : 078 ms      QT Int : 300 ms       P-R-T Axes : 000 073 044 degrees     QTc Int : 391 ms    Atrial fibrillation with rapid ventricular response  Nonspecific T wave abnormality  Abnormal ECG  When compared with ECG of 09-OCT-2023 07:39,  Atrial fibrillation has replaced Atrial flutter    Referred  By: [email or web address]   SELF           Confirmed By:        Radiology:  Imaging Results              X-Ray Chest 1 View (Final result)  Result time 10/09/23 08:09:29      Final result by Nael Rivera MD (10/09/23 08:09:29)                   Impression:      As above.  Prominent interstitial markings with no focal opacification.      Electronically signed by: Nael Rivera  Date:    10/09/2023  Time:    08:09               Narrative:    EXAMINATION:  XR CHEST 1 VIEW    CLINICAL HISTORY:  f/u current process;    TECHNIQUE:  Single view of the chest    COMPARISON:  10/05/2023    FINDINGS:  No pleural effusion or pneumothorax.    The cardiomediastinal silhouette remains prominent with prominent interstitial markings throughout.    No acute osseous abnormality.                                        Current Medications:     Infusions:       Scheduled:   amiodarone  200 mg Oral Daily    ARIPiprazole  2 mg Oral Daily    atorvastatin  20 mg Oral Daily    diltiaZEM  180 mg Oral BID    divalproex  250 mg Oral Q12H    levothyroxine  25 mcg Oral Before breakfast    metoprolol succinate  100 mg Oral Daily    polyethylene glycol  17 g Oral Daily    rivaroxaban  15 mg Oral Daily with dinner        PRN:  acetaminophen, dextrose 10%, dextrose 10%, dextrose 10%, glucagon (human recombinant), glucose, glucose, insulin aspart U-100, melatonin, naloxone, sodium chloride 0.9%      Assessment & Plan:   A-fib with RVR  - CHADS-VASc score 5 points  -  Amiodarone, metoprolol, and diltiazem given in ED  - Continue home amio 200mg PO qd, diltiazem BID. Will defer to day time for titration   - Continue home Xarelto 20mg qhs.   - Trend trop and EKG q6h. Likely type 2 NSTEMI.   - Discontinue metoprolol  - Initiate coreg 6.25 BID for HF and rate control     HFrEF  - Has been on home Lasix 20mg for 1 week for LE edema. Hold for now.   - CXR showed prominent interstitial markings with no focal opacification.  - Echo EF - 35-40%, 10/08/23.  - Starting  coreg 6.25 mg BID     HTN  - Hold home BP meds for now     DM-2, non-insulin dependent   - low-dose SSI   - A1c 5.8  10/9/2023     Hypothyroidism   - Continue home Levothyroxine 25mcg     CKD  - Creatinine likely patient's baseline. CTM      Bipolar Disorder   - Continue home Depakote and Abilify   - Pt PEC'd  on 10/08/2023 @ 16:35.   - CM for psych placement  - Patient was very agitated and pacing around the nursing station  - given 1 time dose of 1 mg lorazepam  - Attempt to avoid QT prolonging medications     L Ankle Contusion  - 2/2 to fall 2 days ago. Seen in ED at time of injury and imaging was completed.   - PT/OT     CODE STATUS: Full  Access: PIV  Antibiotics: none  Diet: Diabetic  DVT Prophylaxis: Xarelto   GI Prophylaxis: none  Fluids: none      Disposition:  Agitated and not oriented to self, place and time. PEC'd on 10/8/2023 @ 16:35. Disposition pending course.    Dominguez Mandujano MD, MBS  [facility] Resident, -I

## 2023-10-11 VITALS
HEIGHT: 63 IN | RESPIRATION RATE: 22 BRPM | DIASTOLIC BLOOD PRESSURE: 79 MMHG | HEART RATE: 87 BPM | SYSTOLIC BLOOD PRESSURE: 101 MMHG | WEIGHT: 149 LBS | BODY MASS INDEX: 26.4 KG/M2 | OXYGEN SATURATION: 99 % | TEMPERATURE: 98 F

## 2023-10-11 PROBLEM — F29 PSYCHOSIS: Status: ACTIVE | Noted: 2023-10-11

## 2023-10-11 LAB
ALBUMIN SERPL-MCNC: 2.5 G/DL (ref 3.4–4.8)
ALBUMIN/GLOB SERPL: 0.7 RATIO (ref 1.1–2)
ALP SERPL-CCNC: 52 UNIT/L (ref 40–150)
ALT SERPL-CCNC: 14 UNIT/L (ref 0–55)
AST SERPL-CCNC: 17 UNIT/L (ref 5–34)
BASOPHILS # BLD AUTO: 0.06 X10(3)/MCL
BASOPHILS NFR BLD AUTO: 1.4 %
BILIRUB SERPL-MCNC: 0.5 MG/DL
BUN SERPL-MCNC: 39.7 MG/DL (ref 9.8–20.1)
CALCIUM SERPL-MCNC: 8.2 MG/DL (ref 8.4–10.2)
CHLORIDE SERPL-SCNC: 108 MMOL/L (ref 98–107)
CO2 SERPL-SCNC: 20 MMOL/L (ref 23–31)
CREAT SERPL-MCNC: 2.15 MG/DL (ref 0.55–1.02)
EOSINOPHIL # BLD AUTO: 0.17 X10(3)/MCL (ref 0–0.9)
EOSINOPHIL NFR BLD AUTO: 3.9 %
ERYTHROCYTE [DISTWIDTH] IN BLOOD BY AUTOMATED COUNT: 17.2 % (ref 11.5–17)
FLUAV AG UPPER RESP QL IA.RAPID: NOT DETECTED
FLUBV AG UPPER RESP QL IA.RAPID: NOT DETECTED
GFR SERPLBLD CREATININE-BSD FMLA CKD-EPI: 25 MLS/MIN/1.73/M2
GLOBULIN SER-MCNC: 3.4 GM/DL (ref 2.4–3.5)
GLUCOSE SERPL-MCNC: 120 MG/DL (ref 82–115)
HCT VFR BLD AUTO: 25.1 % (ref 37–47)
HGB BLD-MCNC: 7.2 G/DL (ref 12–16)
IMM GRANULOCYTES # BLD AUTO: 0.01 X10(3)/MCL (ref 0–0.04)
IMM GRANULOCYTES NFR BLD AUTO: 0.2 %
LYMPHOCYTES # BLD AUTO: 0.95 X10(3)/MCL (ref 0.6–4.6)
LYMPHOCYTES NFR BLD AUTO: 21.6 %
MCH RBC QN AUTO: 23.9 PG (ref 27–31)
MCHC RBC AUTO-ENTMCNC: 28.7 G/DL (ref 33–36)
MCV RBC AUTO: 83.4 FL (ref 80–94)
MONOCYTES # BLD AUTO: 0.39 X10(3)/MCL (ref 0.1–1.3)
MONOCYTES NFR BLD AUTO: 8.9 %
NEUTROPHILS # BLD AUTO: 2.82 X10(3)/MCL (ref 2.1–9.2)
NEUTROPHILS NFR BLD AUTO: 64 %
NRBC BLD AUTO-RTO: 0 %
PLATELET # BLD AUTO: 155 X10(3)/MCL (ref 130–400)
PMV BLD AUTO: 11.7 FL (ref 7.4–10.4)
POCT GLUCOSE: 134 MG/DL (ref 70–110)
POCT GLUCOSE: 182 MG/DL (ref 70–110)
POTASSIUM SERPL-SCNC: 4.5 MMOL/L (ref 3.5–5.1)
PROT SERPL-MCNC: 5.9 GM/DL (ref 5.8–7.6)
RBC # BLD AUTO: 3.01 X10(6)/MCL (ref 4.2–5.4)
RSV A 5' UTR RNA NPH QL NAA+PROBE: NOT DETECTED
SARS-COV-2 RNA RESP QL NAA+PROBE: NOT DETECTED
SODIUM SERPL-SCNC: 135 MMOL/L (ref 136–145)
WBC # SPEC AUTO: 4.4 X10(3)/MCL (ref 4.5–11.5)

## 2023-10-11 PROCEDURE — 97530 THERAPEUTIC ACTIVITIES: CPT

## 2023-10-11 PROCEDURE — 85025 COMPLETE CBC W/AUTO DIFF WBC: CPT

## 2023-10-11 PROCEDURE — 97116 GAIT TRAINING THERAPY: CPT

## 2023-10-11 PROCEDURE — 0241U COVID/RSV/FLU A&B PCR: CPT | Performed by: INTERNAL MEDICINE

## 2023-10-11 PROCEDURE — 80053 COMPREHEN METABOLIC PANEL: CPT

## 2023-10-11 PROCEDURE — 25000003 PHARM REV CODE 250: Performed by: STUDENT IN AN ORGANIZED HEALTH CARE EDUCATION/TRAINING PROGRAM

## 2023-10-11 PROCEDURE — 25000003 PHARM REV CODE 250

## 2023-10-11 RX ORDER — DILTIAZEM HYDROCHLORIDE 180 MG/1
180 CAPSULE, COATED, EXTENDED RELEASE ORAL 2 TIMES DAILY
Qty: 60 CAPSULE | Refills: 0 | Status: ON HOLD | OUTPATIENT
Start: 2023-10-11 | End: 2023-10-30 | Stop reason: HOSPADM

## 2023-10-11 RX ORDER — DIVALPROEX SODIUM 250 MG/1
250 TABLET, DELAYED RELEASE ORAL EVERY 12 HOURS
Qty: 30 TABLET | Refills: 2 | Status: ON HOLD | OUTPATIENT
Start: 2023-10-11 | End: 2023-10-30 | Stop reason: HOSPADM

## 2023-10-11 RX ORDER — ESCITALOPRAM OXALATE 10 MG/1
10 TABLET ORAL DAILY
Qty: 30 TABLET | Refills: 3 | Status: ON HOLD | OUTPATIENT
Start: 2023-10-11 | End: 2023-10-30 | Stop reason: HOSPADM

## 2023-10-11 RX ORDER — BENAZEPRIL HYDROCHLORIDE 5 MG/1
10 TABLET ORAL DAILY
Qty: 30 TABLET | Refills: 3 | Status: ON HOLD | OUTPATIENT
Start: 2023-10-11 | End: 2023-10-30 | Stop reason: HOSPADM

## 2023-10-11 RX ORDER — ATORVASTATIN CALCIUM 20 MG/1
20 TABLET, FILM COATED ORAL DAILY
Qty: 30 TABLET | Refills: 3 | Status: SHIPPED | OUTPATIENT
Start: 2023-10-11

## 2023-10-11 RX ORDER — METOPROLOL SUCCINATE 50 MG/1
50 TABLET, EXTENDED RELEASE ORAL DAILY
Qty: 30 TABLET | Refills: 0 | Status: ON HOLD | OUTPATIENT
Start: 2023-10-11 | End: 2023-10-30 | Stop reason: HOSPADM

## 2023-10-11 RX ORDER — FUROSEMIDE 20 MG/1
20 TABLET ORAL EVERY MORNING
Qty: 30 TABLET | Refills: 3 | Status: ON HOLD | OUTPATIENT
Start: 2023-10-11 | End: 2023-10-30 | Stop reason: HOSPADM

## 2023-10-11 RX ORDER — TRAZODONE HYDROCHLORIDE 100 MG/1
100 TABLET ORAL NIGHTLY
Qty: 30 TABLET | Refills: 3 | Status: SHIPPED | OUTPATIENT
Start: 2023-10-11

## 2023-10-11 RX ORDER — AMIODARONE HYDROCHLORIDE 200 MG/1
200 TABLET ORAL DAILY
Qty: 30 TABLET | Refills: 5 | Status: ON HOLD | OUTPATIENT
Start: 2023-10-11 | End: 2023-10-30 | Stop reason: HOSPADM

## 2023-10-11 RX ORDER — LEVOTHYROXINE SODIUM 25 UG/1
25 TABLET ORAL DAILY
Qty: 30 TABLET | Refills: 3 | Status: ON HOLD | OUTPATIENT
Start: 2023-10-11 | End: 2023-10-30 | Stop reason: HOSPADM

## 2023-10-11 RX ORDER — AMIODARONE HYDROCHLORIDE 200 MG/1
200 TABLET ORAL DAILY
Qty: 30 TABLET | Refills: 0 | Status: ON HOLD | OUTPATIENT
Start: 2023-10-11 | End: 2023-10-30 | Stop reason: HOSPADM

## 2023-10-11 RX ORDER — ISOSORBIDE MONONITRATE 30 MG/1
30 TABLET, EXTENDED RELEASE ORAL DAILY
Qty: 30 TABLET | Refills: 3 | Status: ON HOLD | OUTPATIENT
Start: 2023-10-11 | End: 2023-10-30 | Stop reason: HOSPADM

## 2023-10-11 RX ORDER — SERTRALINE HYDROCHLORIDE 100 MG/1
100 TABLET, FILM COATED ORAL DAILY
Qty: 30 TABLET | Refills: 3 | Status: ON HOLD | OUTPATIENT
Start: 2023-10-11 | End: 2023-10-30 | Stop reason: HOSPADM

## 2023-10-11 RX ADMIN — DIVALPROEX SODIUM 250 MG: 250 TABLET, DELAYED RELEASE ORAL at 09:10

## 2023-10-11 RX ADMIN — DILTIAZEM HYDROCHLORIDE 180 MG: 180 CAPSULE, COATED, EXTENDED RELEASE ORAL at 09:10

## 2023-10-11 RX ADMIN — AMIODARONE HYDROCHLORIDE 200 MG: 200 TABLET ORAL at 09:10

## 2023-10-11 RX ADMIN — CARVEDILOL 6.25 MG: 3.12 TABLET, FILM COATED ORAL at 09:10

## 2023-10-11 RX ADMIN — RIVAROXABAN 15 MG: 15 TABLET, FILM COATED ORAL at 05:10

## 2023-10-11 RX ADMIN — ATORVASTATIN CALCIUM 20 MG: 20 TABLET, FILM COATED ORAL at 09:10

## 2023-10-11 RX ADMIN — LEVOTHYROXINE SODIUM 25 MCG: 0.03 TABLET ORAL at 06:10

## 2023-10-11 NOTE — PROGRESS NOTES
[facility] Medicine Wards Progress Note     Resident Team: [facility] Medicine List 2  Attending Physician: Jose Mayes MD  Resident: Dolores  Intern: Delbert       Subjective:      Brief HPI:  63 year old female with hx of HTN, DM-2, A-fib with RVR, CHF (EF 25% as of 2018), CKD, hypothyroidism, and Bipolar Disorder presents to ED on 10/8/23 for auditory hallucinations. Pt states her landlord brought her to ED for increased anxiety for the past several days. Patient denies voices telling her to hurt herself or others. She denies visual hallucinations and SI/HI. Pt reports she has been out of all medications for the past 2 weeks. She is not able to specify which medications she takes. Denies use of ETOH, tobacco, or illicit drugs.   In ED, pt was tachycardic. Initial troponin 0.047. A-fib on EKG. She was given amiodarone and metoprolol without resolution. Pt is asymptomatic. She denies active palpitations, CP, and SOB. Internal medicine consulted for admission.    Interval History: No acute event overnight. Was given ativan yesterday afternoon and only slept 3 hours. Was given her home trazodone last night and slept throughout last night. Woke up a little woozy,and only oriented to self, not to place and time. Per nurse, no visual, tactile, auditory hallucination. Rate converted and stable at 70/s-80's with coreg 6.25 mg.    Review of Systems:  ROS completed and negative except as indicated above.     Objective:     Last 24 Hour Vital Signs:  BP  Min: 96/70  Max: 135/81  Temp  Av.6 °F (36.4 °C)  Min: 96.6 °F (35.9 °C)  Max: 98 °F (36.7 °C)  Pulse  Av  Min: 80  Max: 113  Resp  Av.5  Min: 18  Max: 20  SpO2  Av.2 %  Min: 99 %  Max: 100 %    I/O last 3 completed shifts:  In: 920 [P.O.:920]  Out: 250 [Urine:250]      Physical Examination:  Vitals reviewed.   Constitutional:       General: She is not in acute distress.     Appearance: She is not ill-appearing.   HENT:      Head: Normocephalic and atraumatic.       Nose:      Comments: Small abrasion   Eyes:      Extraocular Movements: Extraocular movements intact.      Conjunctiva/sclera: Conjunctivae normal.   Cardiovascular:      Rate and Rhythm: Tachycardia present. Rhythm irregular.   Pulmonary:      Effort: Pulmonary effort is normal. No respiratory distress.      Breath sounds: Normal breath sounds. No wheezing.   Abdominal:      General: There is no distension.      Palpations: Abdomen is soft.   Musculoskeletal:         General: Normal range of motion.      Cervical back: Normal range of motion.      Comments: BLE with 1+ pitting edema    Skin:     General: Skin is warm and dry.   Neurological:      General: No focal deficit present.      Mental Status: She is alert.   Psychiatric:      Comments: Oriented to self. Not oriented to person, place, and time. + disorganized speech. Appears anxious and agitated.          Laboratory:  Most Recent Data:  CBC:   Lab Results   Component Value Date    WBC 4.40 (L) 10/11/2023    HGB 7.2 (L) 10/11/2023    HCT 25.1 (L) 10/11/2023     10/11/2023     Recent Results (from the past 24 hour(s))   POCT glucose    Collection Time: 10/10/23  6:15 AM   Result Value Ref Range    POCT Glucose 187 (H) 70 - 110 mg/dL   POCT glucose    Collection Time: 10/10/23 12:13 PM   Result Value Ref Range    POCT Glucose 141 (H) 70 - 110 mg/dL   POCT glucose    Collection Time: 10/10/23  5:45 PM   Result Value Ref Range    POCT Glucose 167 (H) 70 - 110 mg/dL   POCT glucose    Collection Time: 10/10/23  7:49 PM   Result Value Ref Range    POCT Glucose 174 (H) 70 - 110 mg/dL   Comprehensive Metabolic Panel (CMP)    Collection Time: 10/11/23  3:45 AM   Result Value Ref Range    Sodium Level 135 (L) 136 - 145 mmol/L    Potassium Level 4.5 3.5 - 5.1 mmol/L    Chloride 108 (H) 98 - 107 mmol/L    Carbon Dioxide 20 (L) 23 - 31 mmol/L    Glucose Level 120 (H) 82 - 115 mg/dL    Blood Urea Nitrogen 39.7 (H) 9.8 - 20.1 mg/dL    Creatinine 2.15 (H) 0.55 - 1.02  mg/dL    Calcium Level Total 8.2 (L) 8.4 - 10.2 mg/dL    Protein Total 5.9 5.8 - 7.6 gm/dL    Albumin Level 2.5 (L) 3.4 - 4.8 g/dL    Globulin 3.4 2.4 - 3.5 gm/dL    Albumin/Globulin Ratio 0.7 (L) 1.1 - 2.0 ratio    Bilirubin Total 0.5 <=1.5 mg/dL    Alkaline Phosphatase 52 40 - 150 unit/L    Alanine Aminotransferase 14 0 - 55 unit/L    Aspartate Aminotransferase 17 5 - 34 unit/L    eGFR 25 mls/min/1.73/m2   CBC with Differential    Collection Time: 10/11/23  3:45 AM   Result Value Ref Range    WBC 4.40 (L) 4.50 - 11.50 x10(3)/mcL    RBC 3.01 (L) 4.20 - 5.40 x10(6)/mcL    Hgb 7.2 (L) 12.0 - 16.0 g/dL    Hct 25.1 (L) 37.0 - 47.0 %    MCV 83.4 80.0 - 94.0 fL    MCH 23.9 (L) 27.0 - 31.0 pg    MCHC 28.7 (L) 33.0 - 36.0 g/dL    RDW 17.2 (H) 11.5 - 17.0 %    Platelet 155 130 - 400 x10(3)/mcL    MPV 11.7 (H) 7.4 - 10.4 fL    Neut % 64.0 %    Lymph % 21.6 %    Mono % 8.9 %    Eos % 3.9 %    Basophil % 1.4 %    Lymph # 0.95 0.6 - 4.6 x10(3)/mcL    Neut # 2.82 2.1 - 9.2 x10(3)/mcL    Mono # 0.39 0.1 - 1.3 x10(3)/mcL    Eos # 0.17 0 - 0.9 x10(3)/mcL    Baso # 0.06 <=0.2 x10(3)/mcL    IG# 0.01 0 - 0.04 x10(3)/mcL    IG% 0.2 %    NRBC% 0.0 %       Cardiac:   Lab Results   Component Value Date    TROPONINI 0.036 10/09/2023    BNP 1,993.2 (H) 10/10/2023       Microbiology Data:  Microbiology Results (last 7 days)       ** No results found for the last 168 hours. **          Other Results:  EKG    Results for orders placed or performed during the hospital encounter of 10/08/23   EKG 12-lead    Collection Time: 10/09/23  2:46 PM    Narrative    Test Reason :     Vent. Rate : 102 BPM     Atrial Rate : 122 BPM     P-R Int : 000 ms          QRS Dur : 078 ms      QT Int : 300 ms       P-R-T Axes : 000 073 044 degrees     QTc Int : 391 ms    Atrial fibrillation with rapid ventricular response  Nonspecific T wave abnormality  Abnormal ECG  When compared with ECG of 09-OCT-2023 07:39,  Atrial fibrillation has replaced Atrial  flutter    Referred By: [email or web address]   SELF           Confirmed By:        Radiology:  Imaging Results              X-Ray Chest 1 View (Final result)  Result time 10/09/23 08:09:29      Final result by Nael Rivera MD (10/09/23 08:09:29)                   Impression:      As above.  Prominent interstitial markings with no focal opacification.      Electronically signed by: Nael Rivera  Date:    10/09/2023  Time:    08:09               Narrative:    EXAMINATION:  XR CHEST 1 VIEW    CLINICAL HISTORY:  f/u current process;    TECHNIQUE:  Single view of the chest    COMPARISON:  10/05/2023    FINDINGS:  No pleural effusion or pneumothorax.    The cardiomediastinal silhouette remains prominent with prominent interstitial markings throughout.    No acute osseous abnormality.                                        Current Medications:     Infusions:       Scheduled:   amiodarone  200 mg Oral Daily    ARIPiprazole  2 mg Oral Daily    atorvastatin  20 mg Oral Daily    carvediloL  6.25 mg Oral BID    diltiaZEM  180 mg Oral BID    divalproex  250 mg Oral Q12H    levothyroxine  25 mcg Oral Before breakfast    polyethylene glycol  17 g Oral Daily    rivaroxaban  15 mg Oral Daily with dinner        PRN:  acetaminophen, dextrose 10%, dextrose 10%, dextrose 10%, glucagon (human recombinant), glucose, glucose, insulin aspart U-100, melatonin, naloxone, sodium chloride 0.9%, traZODone      Assessment & Plan:   A-fib with RVR  - CHADS-VASc score 5 points  -  Amiodarone, metoprolol, and diltiazem given in ED  - Continue home amio 200mg PO qd, diltiazem BID. Will defer to day time for titration   - Continue home Xarelto 20mg qhs.   - Trend trop and EKG q6h. Likely type 2 NSTEMI.   - Discontinue metoprolol  - Initiate coreg 6.25 BID for HF and rate control     HFrEF  - Has been on home Lasix 20mg for 1 week for LE edema. Hold for now.   - CXR showed prominent interstitial markings with no focal opacification.  - Echo EF - 35-40%,  10/08/23.  - Starting coreg 6.25 mg BID     HTN  - Hold home BP meds for now     DM-2, non-insulin dependent   - low-dose SSI   - A1c 5.8  10/9/2023  - POCT 134     Hypothyroidism   - Continue home Levothyroxine 25mcg     CKD  - Creatinine likely patient's baseline. CTM      Bipolar Disorder   - Continue home Depakote and Abilify   - Pt PEC'd  on 10/08/2023 @ 16:35.   - CM for psych placement  - Patient was very agitated and pacing around the nursing station  - given 1 time dose of 1 mg lorazepam  - Attempt to avoid QT prolonging medications  - Consulted CM for psychiatric placement     L Ankle Contusion  - 2/2 to fall 2 days ago. Seen in ED at time of injury and imaging was completed.   - PT/OT     CODE STATUS: Full  Access: PIV  Antibiotics: none  Diet: Diabetic  DVT Prophylaxis: Xarelto   GI Prophylaxis: none  Fluids: none      Disposition:  Agitated and not oriented to self, place and time. PEC'd on 10/8/2023 @ 16:35. Working with CM to discharge patient to psychiatric facility.    Dominguez Mandujano MD, MBS  [facility] Resident, [facility]

## 2023-10-11 NOTE — UM SECONDARY REVIEW
63-year-old female initially presented on 10/08/2023 with delusions, dysarthria, paranoia.  On admission, tachycardic at 102 a.m., otherwise hemodynamically stable, afebrile.  EKG demonstrated atrial fibrillation with rapid ventricular response in the 140s.  Initial SVT on EKG.  Given 6 mg of adenosine at which point the rate temporarily slowed to reveal atrial fibrillation.  She then received 5 mg of IV metoprolol with minimal improvement.  She was then started on diltiazem IV.  Initial troponin of 0.047.  Admitted to the hospitalist services.  Previous history included hypertension, type 2 diabetes mellitus, atrial fibrillation, heart failure with reduced ejection fraction 25%, CKD, bipolar disorder.  Admitted with AFib with RVR.  She was placed on a physician emergency commitment.  Cardiology were consulted.  Chest x-ray did demonstrate interstitial and alveolar infiltrates consistent with probable hypervolemia.  Cardiology has recommended Coreg transitioned to Toprol-XL.  There is also consideration for some hypervolemia for which Cardiology recommended Lasix.  Would pursue peer to peer on the account of the patient receiving multiple IV AV jadyn blocking agents.  On the day of admission, the patient received IV adenosine, IV diltiazem x2, IV metoprolol x3, multiple AV jadyn blocking augmentations.  I would pursue peer to peer          Burt Rossi MD  Utilization Management  Physician Advisor  [facility]  10/11/2023

## 2023-10-11 NOTE — DISCHARGE SUMMARY
[facility] Discharge Summary    Admitting Physician: Jose Mayes MD  Attending Physician: Jose Mayes MD  Date of Admit: 10/8/2023  Date of Discharge: 10/11/2023    Condition: Stable  Outcome: Condition has improved and patient is now ready for discharge.  [facility]      Discharge Diagnoses     Patient Active Problem List   Diagnosis    Paroxysmal atrial fibrillation    Bipolar disorder    Dyslipidemia    Essential hypertension    Hypothyroidism    Iron deficiency anemia    PAD (peripheral artery disease)    Atrial fibrillation with rapid ventricular response    Chest pain    Elevated troponin I level    Tachycardia         Principal Problem:  Psychosis    Consultants and Procedures     Consultants:  IP CONSULT TO HOSPITAL MEDICINE  IP CONSULT TO SOCIAL WORK/CASE MANAGEMENT  IP CONSULT TO CARDIOLOGY  IP CONSULT TO SOCIAL WORK/CASE MANAGEMENT    Procedures:   * No surgery found *     Brief Admission History      63 year old female with hx of HTN, DM-2, A-fib with RVR, CHF (EF 25% as of 2018), CKD, hypothyroidism, and Bipolar Disorder presents to ED on 10/8/23 for auditory hallucinations. Pt states her landlord brought her to ED for increased anxiety for the past several days. Patient denies voices telling her to hurt herself or others. She denies visual hallucinations and SI/HI. Pt reports she has been out of all medications for the past 2 weeks. She is not able to specify which medications she takes. Denies use of ETOH, tobacco, or illicit drugs.   In ED, pt was tachycardic. Initial troponin 0.047. A-fib on EKG. She was given amiodarone and metoprolol without resolution. Pt is asymptomatic. She denies active palpitations, CP, and SOB. Internal medicine consulted for admission.     Interval History: No acute event overnight. Was given ativan yesterday afternoon and only slept 3 hours. Was given her home trazodone last night and slept throughout last night. Woke up a  little woozy,and only oriented to self, not to place and time. Per nurse, no visual, tactile, auditory hallucination. Rate converted and stable at 70/s-80's with coreg 6.25 mg.    Hospital Course with Pertinent Findings     -Patient presented with Psychosis, agitation, restlessness, afib with RVR, subsequently PEC'd.  -Troponin trended and normalized  -Coreg 6.25 helped achieve rate control of 70s - 80's  -Psychotic symptoms improved  -Started on home Abilify, and developed suspected agranulocytosis and leukopenia, med discontinued.   -Psychiatric to follow and make necessary adjustment to antipsychotic meds   -Agitation and insomnia improved with ativan and one time trazodone  -CM helped place patient at Psychiatric facility.  -Patient has no other complaints at this. Clinically stable for discharge.    Discharge physical exam:  Physical Exam  Constitutional:       Comments: Patient appears confused, sitting comfortably in bed and speaking incoherently and unintelligibly.    HENT:      Head: Normocephalic.   Cardiovascular:      Rate and Rhythm: Normal rate.      Pulses: Normal pulses.      Heart sounds: Normal heart sounds.   Pulmonary:      Effort: Pulmonary effort is normal. No respiratory distress.      Breath sounds: Normal breath sounds. No wheezing or rales.   Abdominal:      General: Bowel sounds are normal. There is no distension.      Tenderness: There is no abdominal tenderness. There is no guarding.   Musculoskeletal:         General: No swelling, tenderness or deformity.      Cervical back: Normal range of motion.      Right lower leg: No edema.      Left lower leg: No edema.   Skin:     General: Skin is warm.   Neurological:      Mental Status: She is alert. Mental status is at baseline. She is disoriented.         TIME SPENT ON DISCHARGE: 60 minutes    Discharge Medications        Medication List        CHANGE how you take these medications      * amiodarone 200 MG Tab  Commonly known as:  PACERONE  Take 1 tablet (200 mg total) by mouth once daily.  What changed: You were already taking a medication with the same name, and this prescription was added. Make sure you understand how and when to take each.     * amiodarone 200 MG Tab  Commonly known as: PACERONE  Take 1 tablet (200 mg total) by mouth once daily.  What changed: Another medication with the same name was added. Make sure you understand how and when to take each.     atorvastatin 20 MG tablet  Commonly known as: LIPITOR  Take 1 tablet (20 mg total) by mouth once daily.  What changed: See the new instructions.     benazepriL 5 MG tablet  Commonly known as: LOTENSIN  Take 2 tablets (10 mg total) by mouth once daily.  What changed:   medication strength  how to take this  when to take this     divalproex 250 MG EC tablet  Commonly known as: DEPAKOTE  Take 1 tablet (250 mg total) by mouth every 12 (twelve) hours.  What changed: See the new instructions.     EScitalopram oxalate 10 MG tablet  Commonly known as: LEXAPRO  Take 1 tablet (10 mg total) by mouth once daily.  What changed: when to take this     isosorbide mononitrate 30 MG 24 hr tablet  Commonly known as: IMDUR  Take 1 tablet (30 mg total) by mouth once daily.  What changed: when to take this     levothyroxine 25 MCG tablet  Commonly known as: SYNTHROID  Take 1 tablet (25 mcg total) by mouth once daily.  What changed: when to take this     sertraline 100 MG tablet  Commonly known as: ZOLOFT  Take 1 tablet (100 mg total) by mouth once daily.  What changed: See the new instructions.     traZODone 100 MG tablet  Commonly known as: DESYREL  Take 1 tablet (100 mg total) by mouth every evening.  What changed: See the new instructions.           * This list has 2 medication(s) that are the same as other medications prescribed for you. Read the directions carefully, and ask your doctor or other care provider to review them with you.                CONTINUE taking these medications      diltiaZEM  180 MG 24 hr capsule  Commonly known as: CARDIZEM CD  Take 1 capsule (180 mg total) by mouth 2 (two) times daily.     furosemide 20 MG tablet  Commonly known as: LASIX  Take 1 tablet (20 mg total) by mouth every morning.     metoprolol succinate 50 MG 24 hr tablet  Commonly known as: TOPROL-XL  Take 1 tablet (50 mg total) by mouth once daily.     rivaroxaban 20 mg Tab  Commonly known as: XARELTO  Take 1 tablet (20 mg total) by mouth daily with dinner or evening meal.            STOP taking these medications      ARIPiprazole 2 MG Tab  Commonly known as: ABILIFY               Where to Get Your Medications        You can get these medications from any pharmacy    Bring a paper prescription for each of these medications  amiodarone 200 MG Tab  amiodarone 200 MG Tab  atorvastatin 20 MG tablet  benazepriL 5 MG tablet  diltiaZEM 180 MG 24 hr capsule  divalproex 250 MG EC tablet  EScitalopram oxalate 10 MG tablet  furosemide 20 MG tablet  isosorbide mononitrate 30 MG 24 hr tablet  levothyroxine 25 MCG tablet  metoprolol succinate 50 MG 24 hr tablet  rivaroxaban 20 mg Tab  sertraline 100 MG tablet  traZODone 100 MG tablet         Discharge Information:     -The following labs are to be drawn at the [facility] visit: None  -The following imaging studies are to be ordered at the [facility] visit: None    At this time,Alejandra Strong is determined to have maximized benefits of IP hospitalization. He is discharged in stable condition with OP f/u recommendations and instructions. All questions answered, and patient verbalized agreement with the POC. They were given return precautions prior to d/c including symptoms that should prompt return to ED or to call PCP. Total time spent of DC of 35 minutes.       Dominguez Mandujano MD, [facility] Family Medicine Resident, [facility]

## 2023-10-11 NOTE — PT/OT/SLP PROGRESS
Physical Therapy Treatment    Patient Name:  Alejandra Strong   MRN:  [id number]    Recommendations     Discharge Recommendations:  psychiatric facility, nursing facility, skilled   Discharge Equipment Recommendations: walker, rolling   Barriers to discharge: fall risk, severity of deficits, level of skilled assistance required, decreased endurance, impaired cognitive status, decreased safety awareness, decreased caregiver support, limited family support, time since onset, medical diagnosis, past medical history, and complicated medical history    Assessment     Alejandra Strong is a 63 y.o. female admitted with a medical diagnosis of:  Final diagnoses:  [F29] Psychosis, unspecified psychosis type (Primary)  [I48.91] Atrial fibrillation with rapid ventricular response  [R79.89] Elevated troponin I level    Patient Active Problem List   Diagnosis    Paroxysmal atrial fibrillation    Bipolar disorder    Dyslipidemia    Essential hypertension    Hypothyroidism    Iron deficiency anemia    PAD (peripheral artery disease)    Atrial fibrillation with rapid ventricular response    Chest pain    Elevated troponin I level    Tachycardia       She presents with the following impairments/functional limitations:  weakness, impaired endurance, impaired self care skills, impaired functional mobility, gait instability, impaired balance, impaired cognition, decreased safety awareness.    Rehab Prognosis: Fair.    Patient would benefit from continued skilled acute PT services to: address above listed impairments/functional limitations; receive patient/caregiver education; reduce fall risk; and maximize independency/safety with functional mobility.    Recent Surgery: * No surgery found *      Pt tolerated session fair today with decreased balance and increased unsteadiness during gait along with increased dizziness possibly due to decreased activity tolerance. Pt continues to have 1 on 1 sitter in room.     Plan     During this  "hospitalization, patient to be seen 3 x/week to address the identified impairments/functional limitations via gait training, therapeutic activities, therapeutic exercises, neuromuscular re-education and progress toward the established goals.    Plan of Care Expires:  11/06/23    Subjective     Communicated with patient's nurse prior to session.    Patient agreeable to participate in treatment session.    Chief Complaint: "I slept like a baby last night."  Patient/Family Comments/goals: "I don't want to go to [place]."  Pain/Comfort:  Pain Rating 1: 0/10  Pain Rating Post-Intervention 1: 0/10    Objective     Patient found supine in bed and with HOB elevated with peripheral IV, telemetry  upon PT entry to room.    General Precautions: Standard, fall   Orthopedic Precautions:N/A   Braces:    Respiratory Status: room air    Functional Mobility:    Bed Mobility:  Supine to Sit: moderate assistance (with elevated HOB and assistance for trunk advancement, verbal cueing for sequencing, x2 trials)   Sit to Supine: contact guard assistance    Transfers:  Sit to Stand: minimum assistance with rolling walker (from EOB x2 times, mild increased unsteadiness)   Bed to Chair: minimum assistance with rolling walker using Step Transfer    Gait:  Patient ambulated 130ft and an additional x130ft with rolling walker and minimum assistance. (Pt required supine rest break after 1st trial due to pt noting increased dizziness, no dizziness upon 2nd trial)    Patient demonstrates unsteady gait, decreased lizeth, decreased step length, narrow base of support, and ambulates outside VITOR of RW. (Increased verbal cueing to keep RW close and avoid obstacles in hallway with poor carry over.     Vitals   Vitals at Rest after 1st trial of gait in supine  BP  110/77   HR  76   O2 Sat  100%         Other Mobility:  not assessed    Patient left sitting in chair with all lines intact, call button in reach, tray table at bedside, RN notified, and Pt's " 1 on 1 sitter present.    Goals     Multidisciplinary Problems       Physical Therapy Goals          Problem: Physical Therapy    Goal Priority Disciplines Outcome Goal Variances Interventions   Physical Therapy Goal     PT, PT/OT Ongoing, Progressing     Description: Goals to be met by: Discharge     Patient will increase functional independence with mobility by performing:    -. Sit to stand transfer with Modified Gage with use of RW.   -. Bed to chair transfer with Modified Gage using Rolling Walker  -. Gait  x 390 feet with Modified Gage using Rolling Walker.                        Time Tracking     PT Received On: 10/11/23  PT Start Time: 0832     PT Stop Time: 0855  PT Total Time (min): 23 min     Billable Minutes: Gait Training 15 and Therapeutic Activity 8    10/11/2023

## 2023-10-11 NOTE — CONSULTS
Cardiology Consult Note    History of Present Illness:  This is a 63 y.o. female with HTN, T2DM, AF, HFrEF (LVEF 35-40%), CKD, hypothyroidism, and BP disorder who presented to the ED for hallucination. Unable to obtain history from the patient when questioning for congestive symptoms related to heart failure or angina.     Upon presentation she was noted to be in AF with RVR. BNP nearly 2000. Troponin 0.047 with down trend. CXR reviewed with enlarged cardiac silhouette and interstitial and alveolar infiltrates.     Review of patient's allergies indicates:   Allergen Reactions    Pcn [penicillins] Itching       Review of systems: 12 point review of systems conducted, negative except as stated in the HPI.     Past Medical History: History reviewed. No pertinent past medical history.    Procedure History: History reviewed. No pertinent surgical history.    Family History: family history is not on file.    Social History:  reports that she has never smoked. She has never used smokeless tobacco. She reports that she does not currently use drugs.    Home Medications:   Medications Prior to Admission   Medication Sig Dispense Refill Last Dose    atorvastatin (LIPITOR) 20 MG tablet TAKE ONE TABLET BY MOUTH EVERY EVENING TO LOWER CHOLESTEROL       divalproex (DEPAKOTE) 250 MG EC tablet TAKE ONE TABLET BY MOUTH TWICE DAILY FOR MOOD       sertraline (ZOLOFT) 100 MG tablet TAKE ONE TABLET BY MOUTH ONCE EVERY MORNING FOR MOOD       traZODone (DESYREL) 100 MG tablet TAKE ONE TABLET BY MOUTH ONCE DAILY AT BEDTIME FOR SLEEP       amiodarone (PACERONE) 200 MG Tab Take 1 tablet (200 mg total) by mouth once daily. 30 tablet 0     ARIPiprazole (ABILIFY) 2 MG Tab Take 2 mg by mouth.       benazepriL (LOTENSIN) 10 MG tablet 10 mg.       diltiaZEM (CARDIZEM CD) 180 MG 24 hr capsule Take 1 capsule (180 mg total) by mouth 2 (two) times daily. 60 capsule 0     EScitalopram oxalate (LEXAPRO) 10 MG tablet Take 1 tablet by mouth once daily.        furosemide (LASIX) 20 MG tablet Take 20 mg by mouth every morning.       isosorbide mononitrate (IMDUR) 30 MG 24 hr tablet Take 30 mg by mouth.       levothyroxine (SYNTHROID) 25 MCG tablet Take 1 tablet by mouth once daily.       metoprolol succinate (TOPROL-XL) 50 MG 24 hr tablet Take 1 tablet (50 mg total) by mouth once daily. 30 tablet 0     rivaroxaban (XARELTO) 20 mg Tab Take 1 tablet (20 mg total) by mouth daily with dinner or evening meal. 30 tablet 0        Inpatient Medications:     Current Facility-Administered Medications:     acetaminophen tablet 650 mg, 650 mg, Oral, Q4H PRN, St. Ramos, Tiffanie, DO    amiodarone tablet 200 mg, 200 mg, Oral, Daily, St. Ramos, Tiffanie, DO, 200 mg at 10/10/23 0845    ARIPiprazole tablet 2 mg, 2 mg, Oral, Daily, St. Ramos, Tiffanie, DO, 2 mg at 10/10/23 0845    atorvastatin tablet 20 mg, 20 mg, Oral, Daily, St. Ramos, Tiffanie, DO, 20 mg at 10/10/23 0845    carvediloL tablet 6.25 mg, 6.25 mg, Oral, BID, Elyse Bernal MD    dextrose 10% bolus 125 mL 125 mL, 12.5 g, Intravenous, PRN, StJoseph Willisn, DO    dextrose 10% bolus 250 mL 250 mL, 25 g, Intravenous, PRN, St. Joseph Beasleyn, DO    dextrose 10% bolus 250 mL 250 mL, 250 mL, Intravenous, bolus from bagDolores Lilla, MD, Stopped at 10/09/23 0930    diltiaZEM 24 hr capsule 180 mg, 180 mg, Oral, BID, St. Ramos Tiffanie, DO, 180 mg at 10/10/23 0845    divalproex EC tablet 250 mg, 250 mg, Oral, Q12H, St. Ladan Beasleyelyn, DO, 250 mg at 10/10/23 0846    glucagon (human recombinant) injection 1 mg, 1 mg, Intramuscular, PRN, StTiffanie Willis, DO    glucose chewable tablet 16 g, 16 g, Oral, PRN, StTiffanie Willis, DO    glucose chewable tablet 24 g, 24 g, Oral, PRN, Tiffanie Eagle,     insulin aspart U-100 injection 0-5 Units, 0-5 Units, Subcutaneous, QID (AC + HS) PRN, Tiffanie Eagle DO    levothyroxine tablet 25 mcg, 25 mcg, Oral, Before breakfast, Tiffanie Eagle, , 25 mcg  at 10/10/23 0616    melatonin tablet 6 mg, 6 mg, Oral, Nightly PRN, Ldaan Eagleelyn, , 6 mg at 10/09/23 2016    naloxone 0.4 mg/mL injection 0.02 mg, 0.02 mg, Intravenous, PRN, Vic Ramos, Tiffanie, DO    polyethylene glycol packet 17 g, 17 g, Oral, Daily, St. Beasley Tiffanie,     rivaroxaban tablet 15 mg, 15 mg, Oral, Daily with dinner, St. Beasley Tiffanie, , 15 mg at 10/10/23 1713    sodium chloride 0.9% flush 10 mL, 10 mL, Intravenous, Q12H PRN, Joseph EagleDO liya     Laboratory Data:   Recent Results (from the past 24 hour(s))   POCT glucose    Collection Time: 10/09/23 11:17 PM   Result Value Ref Range    POCT Glucose 184 (H) 70 - 110 mg/dL   Comprehensive Metabolic Panel (CMP)    Collection Time: 10/10/23  4:03 AM   Result Value Ref Range    Sodium Level 133 (L) 136 - 145 mmol/L    Potassium Level 5.0 3.5 - 5.1 mmol/L    Chloride 105 98 - 107 mmol/L    Carbon Dioxide 19 (L) 23 - 31 mmol/L    Glucose Level 139 (H) 82 - 115 mg/dL    Blood Urea Nitrogen 38.2 (H) 9.8 - 20.1 mg/dL    Creatinine 2.47 (H) 0.55 - 1.02 mg/dL    Calcium Level Total 9.1 8.4 - 10.2 mg/dL    Protein Total 7.8 (H) 5.8 - 7.6 gm/dL    Albumin Level 3.2 (L) 3.4 - 4.8 g/dL    Globulin 4.6 (H) 2.4 - 3.5 gm/dL    Albumin/Globulin Ratio 0.7 (L) 1.1 - 2.0 ratio    Bilirubin Total 0.9 <=1.5 mg/dL    Alkaline Phosphatase 77 40 - 150 unit/L    Alanine Aminotransferase 20 0 - 55 unit/L    Aspartate Aminotransferase 27 5 - 34 unit/L    eGFR 21 mls/min/1.73/m2   Magnesium    Collection Time: 10/10/23  4:03 AM   Result Value Ref Range    Magnesium Level 2.90 (H) 1.60 - 2.60 mg/dL   Phosphorus    Collection Time: 10/10/23  4:03 AM   Result Value Ref Range    Phosphorus Level 4.6 2.3 - 4.7 mg/dL   BNP    Collection Time: 10/10/23  4:03 AM   Result Value Ref Range    Natriuretic Peptide 1,993.2 (H) <=100.0 pg/mL   CBC with Differential    Collection Time: 10/10/23  4:03 AM   Result Value Ref Range    WBC 11.38 4.50 - 11.50 x10(3)/mcL     "RBC 3.83 (L) 4.20 - 5.40 x10(6)/mcL    Hgb 9.1 (L) 12.0 - 16.0 g/dL    Hct 32.0 (L) 37.0 - 47.0 %    MCV 83.6 80.0 - 94.0 fL    MCH 23.8 (L) 27.0 - 31.0 pg    MCHC 28.4 (L) 33.0 - 36.0 g/dL    RDW 17.2 (H) 11.5 - 17.0 %    Platelet 284 130 - 400 x10(3)/mcL    MPV 11.9 (H) 7.4 - 10.4 fL    Neut % 74.1 %    Lymph % 14.8 %    Mono % 7.5 %    Eos % 1.9 %    Basophil % 1.3 %    Lymph # 1.68 0.6 - 4.6 x10(3)/mcL    Neut # 8.43 2.1 - 9.2 x10(3)/mcL    Mono # 0.85 0.1 - 1.3 x10(3)/mcL    Eos # 0.22 0 - 0.9 x10(3)/mcL    Baso # 0.15 <=0.2 x10(3)/mcL    IG# 0.05 (H) 0 - 0.04 x10(3)/mcL    IG% 0.4 %    NRBC% 0.0 %   POCT glucose    Collection Time: 10/10/23  6:15 AM   Result Value Ref Range    POCT Glucose 187 (H) 70 - 110 mg/dL   POCT glucose    Collection Time: 10/10/23 12:13 PM   Result Value Ref Range    POCT Glucose 141 (H) 70 - 110 mg/dL   POCT glucose    Collection Time: 10/10/23  5:45 PM   Result Value Ref Range    POCT Glucose 167 (H) 70 - 110 mg/dL   POCT glucose    Collection Time: 10/10/23  7:49 PM   Result Value Ref Range    POCT Glucose 174 (H) 70 - 110 mg/dL       Imaging:  X-Ray Chest 1 View   Final Result      As above.  Prominent interstitial markings with no focal opacification.         Electronically signed by: Nael Rivera   Date:    10/09/2023   Time:    08:09          Physical Exam:   /81 (BP Location: Left arm, Patient Position: Lying)   Pulse (!) 113   Temp 96.6 °F (35.9 °C) (Axillary)   Resp 20   Ht 5' 3" (1.6 m)   Wt 67.6 kg (149 lb)   SpO2 100%   BMI 26.39 kg/m²  Body mass index is 26.39 kg/m².  Physical Exam  Vitals reviewed.   Constitutional:       General: She is not in acute distress.     Appearance: Normal appearance. She is normal weight. She is not ill-appearing.   HENT:      Head: Normocephalic and atraumatic.      Right Ear: External ear normal.      Left Ear: External ear normal.      Nose: Nose normal.      Mouth/Throat:      Mouth: Mucous membranes are moist.   Eyes:      " Conjunctiva/sclera: Conjunctivae normal.   Cardiovascular:      Rate and Rhythm: Normal rate. Rhythm irregular.      Pulses: Normal pulses.      Heart sounds: No murmur heard.  Pulmonary:      Effort: Pulmonary effort is normal. No respiratory distress.      Breath sounds: Normal breath sounds. No stridor. No wheezing, rhonchi or rales.   Chest:      Chest wall: No tenderness.   Abdominal:      General: Abdomen is flat. Bowel sounds are normal. There is no distension.      Palpations: Abdomen is soft.   Musculoskeletal:      Cervical back: Neck supple.      Right lower leg: No edema.      Left lower leg: No edema.   Skin:     General: Skin is warm and dry.      Capillary Refill: Capillary refill takes less than 2 seconds.   Neurological:      Mental Status: She is alert and oriented to person, place, and time.   Psychiatric:         Mood and Affect: Mood normal.         Behavior: Behavior normal.       Impression:   63 y.o. female with HTN, T2DM, AF, HFrEF (LVEF 35-40%), CKD IV, hypothyroidism, and BP disorder who presented to the ED for hallucination. Cardiology consulted for AF with RVR.    Plan:   AF  -Overall rate controlled. Would switch Coreg 6.25 mg to Toprol XL 50 mg and wean Diltiazem as able in lieu up titration of Toprol XL. Would wean CCB given reduced LVEF. Toprol XL likely has marginally improved beta selectivity. Ideally would use Bisoprolol but not available at this institution.   -Can continue Amiodarone.   -Continue Xarelto for CVA risk reduction.     HFrEF  -Consider switch back to Toprol XL. If BP room allows would use ARNi> ACEi> ARB. eGFR will likely limit SGLT2i and MRA.   -Appears to have some degree of decompensation with elevated BNP and CXR findings of alveolar and interstitial infiltrates. Would trial Lasix 40 IV and assess UOP. Increase LA pressure from volume may exacerbate her underlying AF.    HTN  -GDMT for HFrEF.     Thank you very much for your consultation    Jewel Tamayo  MD [facility] Cardiology Fellow PGY-5

## 2023-10-11 NOTE — PROGRESS NOTES
Referral for IP Psych Placement called in to Lindsay Culp. PEC/CEC faxed to intake at [phone].

## 2023-10-11 NOTE — DISCHARGE INSTRUCTIONS
Patient being transferred to [facility] in [place], [place] for psych evaluation per [facility] transportation. Patient belongings will be transferred with patient.

## 2023-10-11 NOTE — PROGRESS NOTES
Contacted by Jessica Culp, stating pt accepted for IP Psych Placement by [facility], [address] [facility], [place] [address], under the care of Dr. Medeiros.  Report to be called to [phone]. Pt will transport via [facility].  Discharge Order requested.   Goal Outcome Evaluation:               No new concerns this evening. Resting in bed with eyes closed. No s/s distress overnight.

## 2023-10-11 NOTE — UM SECONDARY REVIEW
Denial overturned after p2p with Dr. Monsivais ([facility] MD) on 10/11/23 at 9:20 AM CT.            Burt Rossi MD  Utilization Management  Physician Advisor  [facility] Medicine  10/11/2023

## 2023-10-12 NOTE — PT/OT/SLP DISCHARGE
Occupational Therapy Discharge Summary    Alejandra Strong  MRN: [id number]   Principal Problem: Psychosis      Patient Discharged from acute Occupational Therapy on 10/11/23.  Please refer to prior OT note dated 10/10/23 for functional status.    Assessment:       Pt has been d/c'd from acute inpatient hospital setting, therefore dc'ing from OT services. Pt req'd CGA/min A for mobility due to decreased balance, req'd min A for LE dressing, mod A toileting, SBA for oral hygiene. Pt anxious with poor safety awareness, req'd direct supervision due to fall risk.      Objective:     GOALS:   Multidisciplinary Problems       Occupational Therapy Goals          Problem: Occupational Therapy    Goal Priority Disciplines Outcome Interventions   Occupational Therapy Goal     OT, PT/OT Ongoing, Progressing    Description: Pt will ambulate to bathroom and complete toilet t/f with least restrictive device mod I-not met  Pt will complete toileting tasks mod I-not met  Pt will complete LE dressing mod I-not met  Pt will complete grooming in standing at sink mod I- not met                       Reasons for Discontinuation of Therapy Services  Transfer to alternate level of care.      Plan:     Patient Discharged to:  Psychiatric inpatient hospital     10/12/2023

## 2023-10-12 NOTE — PT/OT/SLP DISCHARGE
POST DISCHARGE DOCUMENTATION - 10/12/2023 7:12 AM    Physical Therapy Discharge Note    Documenting Physical Therapist did not evaluate OR treat the patient.    Evaluating/treating Physical Therapist is not available to complete discharge summary.    Refer to prior Physical Therapy note dated 10/11/2023 for last known functional status of patient.      Name: Alejandra Strong  MRN: [id number]   Principal Problem: Psychosis     Recommendations: per last treatment session     Discharge Recommendations:  psychiatric facility, nursing facility, skilled   Discharge Equipment Recommendations: walker, rolling     Assessment:     Patient was unexpectedly discharged from hospital.  Refer to therapy's initial evaluation or last treatment note for patient's most recent functional status and goal achievement and therapists' recommendations.    Objective:     GOALS: 0 OUT OF 3 STG's met by patient    Multidisciplinary Problems       Physical Therapy Goals       Problem: Physical Therapy    Goal Priority Disciplines Outcome Goal Variances Interventions   Physical Therapy Goal     PT, PT/OT Ongoing, Progressing     Description: Goals to be met by: Discharge     Patient will increase functional independence with mobility by performing:    -. Sit to stand transfer with Modified [place] with use of RW - NOT MET  -. Bed to chair transfer with Modified [place] using Rolling Walker - NOT MET  -. Gait  x 390 feet with Modified [place] using Rolling Walker - NOT MET             Plan:     Patient Discharged to: psychiatric facility per chart.      10/12/2023

## 2023-10-18 NOTE — PROGRESS NOTES
I have reviewed and concur with the resident's history, physical, assessment, and plan.  I have discussed with him all issues related to the diagnosis, workup and treatment plan. Care provided as reasonable and necessary.     Jose Mayes MD  [facility]

## 2023-10-19 ENCOUNTER — HOSPITAL ENCOUNTER (INPATIENT)
Facility: HOSPITAL | Age: 63
LOS: 12 days | Discharge: NURSING, REHAB OR LONG-TERM CARE | DRG: 291 | End: 2023-10-31
Attending: EMERGENCY MEDICINE | Admitting: INTERNAL MEDICINE
Payer: MEDICAID

## 2023-10-19 DIAGNOSIS — E83.51 HYPOCALCEMIA: ICD-10-CM

## 2023-10-19 DIAGNOSIS — I48.91 A-FIB: ICD-10-CM

## 2023-10-19 DIAGNOSIS — R07.9 CHEST PAIN: ICD-10-CM

## 2023-10-19 DIAGNOSIS — E87.6 HYPOKALEMIA: ICD-10-CM

## 2023-10-19 DIAGNOSIS — I48.0 PAROXYSMAL ATRIAL FIBRILLATION: ICD-10-CM

## 2023-10-19 DIAGNOSIS — R06.02 SHORTNESS OF BREATH: ICD-10-CM

## 2023-10-19 DIAGNOSIS — F31.9 BIPOLAR AFFECTIVE DISORDER, REMISSION STATUS UNSPECIFIED: ICD-10-CM

## 2023-10-19 DIAGNOSIS — I50.23 ACUTE ON CHRONIC SYSTOLIC CONGESTIVE HEART FAILURE: Primary | ICD-10-CM

## 2023-10-19 PROBLEM — I50.43 ACUTE ON CHRONIC COMBINED SYSTOLIC AND DIASTOLIC CONGESTIVE HEART FAILURE: Status: ACTIVE | Noted: 2023-10-19

## 2023-10-19 LAB
ALBUMIN SERPL BCP-MCNC: 1.8 G/DL (ref 3.5–5.2)
ALP SERPL-CCNC: 40 U/L (ref 55–135)
ALT SERPL W/O P-5'-P-CCNC: 13 U/L (ref 10–44)
ANION GAP SERPL CALC-SCNC: 6 MMOL/L (ref 8–16)
AST SERPL-CCNC: 21 U/L (ref 10–40)
BASOPHILS # BLD AUTO: 0.04 K/UL (ref 0–0.2)
BASOPHILS NFR BLD: 0.8 % (ref 0–1.9)
BILIRUB SERPL-MCNC: 0.3 MG/DL (ref 0.1–1)
BNP SERPL-MCNC: 912 PG/ML (ref 0–99)
BUN SERPL-MCNC: 20 MG/DL (ref 8–23)
CALCIUM SERPL-MCNC: 5.4 MG/DL (ref 8.7–10.5)
CHLORIDE SERPL-SCNC: 118 MMOL/L (ref 95–110)
CK SERPL-CCNC: 23 U/L (ref 20–180)
CO2 SERPL-SCNC: 18 MMOL/L (ref 23–29)
CREAT SERPL-MCNC: 0.8 MG/DL (ref 0.5–1.4)
DIFFERENTIAL METHOD: ABNORMAL
EOSINOPHIL # BLD AUTO: 0 K/UL (ref 0–0.5)
EOSINOPHIL NFR BLD: 0.8 % (ref 0–8)
ERYTHROCYTE [DISTWIDTH] IN BLOOD BY AUTOMATED COUNT: 17.2 % (ref 11.5–14.5)
EST. GFR  (NO RACE VARIABLE): >60 ML/MIN/1.73 M^2
GLUCOSE SERPL-MCNC: 88 MG/DL (ref 70–110)
HCT VFR BLD AUTO: 25.4 % (ref 37–48.5)
HGB BLD-MCNC: 7.2 G/DL (ref 12–16)
IMM GRANULOCYTES # BLD AUTO: 0.05 K/UL (ref 0–0.04)
IMM GRANULOCYTES NFR BLD AUTO: 0.9 % (ref 0–0.5)
INR PPP: 1.8 (ref 0.8–1.2)
LYMPHOCYTES # BLD AUTO: 0.8 K/UL (ref 1–4.8)
LYMPHOCYTES NFR BLD: 14.2 % (ref 18–48)
MCH RBC QN AUTO: 23.4 PG (ref 27–31)
MCHC RBC AUTO-ENTMCNC: 28.3 G/DL (ref 32–36)
MCV RBC AUTO: 83 FL (ref 82–98)
MONOCYTES # BLD AUTO: 0.7 K/UL (ref 0.3–1)
MONOCYTES NFR BLD: 13.2 % (ref 4–15)
NEUTROPHILS # BLD AUTO: 3.7 K/UL (ref 1.8–7.7)
NEUTROPHILS NFR BLD: 70.1 % (ref 38–73)
NRBC BLD-RTO: 0 /100 WBC
PLATELET # BLD AUTO: 151 K/UL (ref 150–450)
PMV BLD AUTO: 10.6 FL (ref 9.2–12.9)
POTASSIUM SERPL-SCNC: 2.7 MMOL/L (ref 3.5–5.1)
PROT SERPL-MCNC: 4.2 G/DL (ref 6–8.4)
PROTHROMBIN TIME: 18.9 SEC (ref 9–12.5)
RBC # BLD AUTO: 3.08 M/UL (ref 4–5.4)
SODIUM SERPL-SCNC: 142 MMOL/L (ref 136–145)
TROPONIN I SERPL DL<=0.01 NG/ML-MCNC: 0.01 NG/ML (ref 0–0.03)
TROPONIN I SERPL DL<=0.01 NG/ML-MCNC: 0.01 NG/ML (ref 0–0.03)
WBC # BLD AUTO: 5.29 K/UL (ref 3.9–12.7)

## 2023-10-19 PROCEDURE — 99285 EMERGENCY DEPT VISIT HI MDM: CPT | Mod: 25

## 2023-10-19 PROCEDURE — 83880 ASSAY OF NATRIURETIC PEPTIDE: CPT | Performed by: EMERGENCY MEDICINE

## 2023-10-19 PROCEDURE — 93010 ELECTROCARDIOGRAM REPORT: CPT | Mod: ,,, | Performed by: STUDENT IN AN ORGANIZED HEALTH CARE EDUCATION/TRAINING PROGRAM

## 2023-10-19 PROCEDURE — 85610 PROTHROMBIN TIME: CPT | Performed by: EMERGENCY MEDICINE

## 2023-10-19 PROCEDURE — 83036 HEMOGLOBIN GLYCOSYLATED A1C: CPT | Performed by: INTERNAL MEDICINE

## 2023-10-19 PROCEDURE — 84484 ASSAY OF TROPONIN QUANT: CPT | Performed by: EMERGENCY MEDICINE

## 2023-10-19 PROCEDURE — 36415 COLL VENOUS BLD VENIPUNCTURE: CPT | Performed by: EMERGENCY MEDICINE

## 2023-10-19 PROCEDURE — 84484 ASSAY OF TROPONIN QUANT: CPT | Mod: 91 | Performed by: INTERNAL MEDICINE

## 2023-10-19 PROCEDURE — 93005 ELECTROCARDIOGRAM TRACING: CPT

## 2023-10-19 PROCEDURE — 80053 COMPREHEN METABOLIC PANEL: CPT | Performed by: EMERGENCY MEDICINE

## 2023-10-19 PROCEDURE — 63600175 PHARM REV CODE 636 W HCPCS: Performed by: INTERNAL MEDICINE

## 2023-10-19 PROCEDURE — 84466 ASSAY OF TRANSFERRIN: CPT | Performed by: EMERGENCY MEDICINE

## 2023-10-19 PROCEDURE — 96374 THER/PROPH/DIAG INJ IV PUSH: CPT

## 2023-10-19 PROCEDURE — 21400001 HC TELEMETRY ROOM

## 2023-10-19 PROCEDURE — 25000003 PHARM REV CODE 250: Performed by: EMERGENCY MEDICINE

## 2023-10-19 PROCEDURE — 63600175 PHARM REV CODE 636 W HCPCS: Performed by: EMERGENCY MEDICINE

## 2023-10-19 PROCEDURE — 93010 EKG 12-LEAD: ICD-10-PCS | Mod: ,,, | Performed by: STUDENT IN AN ORGANIZED HEALTH CARE EDUCATION/TRAINING PROGRAM

## 2023-10-19 PROCEDURE — 83540 ASSAY OF IRON: CPT | Performed by: EMERGENCY MEDICINE

## 2023-10-19 PROCEDURE — 25000003 PHARM REV CODE 250: Performed by: INTERNAL MEDICINE

## 2023-10-19 PROCEDURE — 82550 ASSAY OF CK (CPK): CPT | Performed by: EMERGENCY MEDICINE

## 2023-10-19 PROCEDURE — G0378 HOSPITAL OBSERVATION PER HR: HCPCS

## 2023-10-19 PROCEDURE — 96375 TX/PRO/DX INJ NEW DRUG ADDON: CPT

## 2023-10-19 PROCEDURE — 36415 COLL VENOUS BLD VENIPUNCTURE: CPT | Performed by: INTERNAL MEDICINE

## 2023-10-19 PROCEDURE — 85025 COMPLETE CBC W/AUTO DIFF WBC: CPT | Performed by: EMERGENCY MEDICINE

## 2023-10-19 RX ORDER — POLYETHYLENE GLYCOL 3350 17 G/17G
17 POWDER, FOR SOLUTION ORAL DAILY
Status: DISCONTINUED | OUTPATIENT
Start: 2023-10-20 | End: 2023-10-31 | Stop reason: HOSPADM

## 2023-10-19 RX ORDER — FUROSEMIDE 10 MG/ML
60 INJECTION INTRAMUSCULAR; INTRAVENOUS
Status: COMPLETED | OUTPATIENT
Start: 2023-10-19 | End: 2023-10-19

## 2023-10-19 RX ORDER — CALCIUM CARBONATE 200(500)MG
500 TABLET,CHEWABLE ORAL 2 TIMES DAILY
Status: DISCONTINUED | OUTPATIENT
Start: 2023-10-19 | End: 2023-10-22

## 2023-10-19 RX ORDER — ACETAMINOPHEN 325 MG/1
650 TABLET ORAL EVERY 8 HOURS PRN
Status: DISCONTINUED | OUTPATIENT
Start: 2023-10-19 | End: 2023-10-31 | Stop reason: HOSPADM

## 2023-10-19 RX ORDER — DILTIAZEM HYDROCHLORIDE 180 MG/1
180 CAPSULE, COATED, EXTENDED RELEASE ORAL 2 TIMES DAILY
Status: ON HOLD | COMMUNITY
End: 2023-10-30 | Stop reason: HOSPADM

## 2023-10-19 RX ORDER — GLUCAGON 1 MG
1 KIT INJECTION
Status: DISCONTINUED | OUTPATIENT
Start: 2023-10-19 | End: 2023-10-31 | Stop reason: HOSPADM

## 2023-10-19 RX ORDER — IBUPROFEN 200 MG
16 TABLET ORAL
Status: DISCONTINUED | OUTPATIENT
Start: 2023-10-19 | End: 2023-10-31 | Stop reason: HOSPADM

## 2023-10-19 RX ORDER — DIVALPROEX SODIUM 250 MG/1
250 TABLET, DELAYED RELEASE ORAL EVERY 12 HOURS
Status: DISCONTINUED | OUTPATIENT
Start: 2023-10-19 | End: 2023-10-23

## 2023-10-19 RX ORDER — IBUPROFEN 200 MG
24 TABLET ORAL
Status: DISCONTINUED | OUTPATIENT
Start: 2023-10-19 | End: 2023-10-31 | Stop reason: HOSPADM

## 2023-10-19 RX ORDER — LISINOPRIL 5 MG/1
5 TABLET ORAL DAILY
Status: DISCONTINUED | OUTPATIENT
Start: 2023-10-20 | End: 2023-10-20

## 2023-10-19 RX ORDER — AMIODARONE HYDROCHLORIDE 200 MG/1
200 TABLET ORAL DAILY
Status: DISCONTINUED | OUTPATIENT
Start: 2023-10-20 | End: 2023-10-20

## 2023-10-19 RX ORDER — METOPROLOL SUCCINATE 50 MG/1
50 TABLET, EXTENDED RELEASE ORAL DAILY
Status: DISCONTINUED | OUTPATIENT
Start: 2023-10-19 | End: 2023-10-19

## 2023-10-19 RX ORDER — ACETAMINOPHEN 325 MG/1
650 TABLET ORAL EVERY 4 HOURS PRN
Status: DISCONTINUED | OUTPATIENT
Start: 2023-10-19 | End: 2023-10-31 | Stop reason: HOSPADM

## 2023-10-19 RX ORDER — SERTRALINE HYDROCHLORIDE 50 MG/1
100 TABLET, FILM COATED ORAL DAILY
Status: DISCONTINUED | OUTPATIENT
Start: 2023-10-20 | End: 2023-10-20

## 2023-10-19 RX ORDER — AMOXICILLIN 250 MG
1 CAPSULE ORAL 2 TIMES DAILY
Status: DISCONTINUED | OUTPATIENT
Start: 2023-10-19 | End: 2023-10-31 | Stop reason: HOSPADM

## 2023-10-19 RX ORDER — CALCIUM GLUCONATE 98 MG/ML
1 INJECTION, SOLUTION INTRAVENOUS
Status: COMPLETED | OUTPATIENT
Start: 2023-10-19 | End: 2023-10-19

## 2023-10-19 RX ORDER — FUROSEMIDE 10 MG/ML
40 INJECTION INTRAMUSCULAR; INTRAVENOUS
Status: DISCONTINUED | OUTPATIENT
Start: 2023-10-19 | End: 2023-10-20

## 2023-10-19 RX ORDER — POTASSIUM CHLORIDE 7.45 MG/ML
10 INJECTION INTRAVENOUS
Status: COMPLETED | OUTPATIENT
Start: 2023-10-19 | End: 2023-10-19

## 2023-10-19 RX ORDER — TALC
6 POWDER (GRAM) TOPICAL NIGHTLY PRN
Status: DISCONTINUED | OUTPATIENT
Start: 2023-10-19 | End: 2023-10-20

## 2023-10-19 RX ORDER — ONDANSETRON 2 MG/ML
4 INJECTION INTRAMUSCULAR; INTRAVENOUS EVERY 8 HOURS PRN
Status: DISCONTINUED | OUTPATIENT
Start: 2023-10-19 | End: 2023-10-31 | Stop reason: HOSPADM

## 2023-10-19 RX ORDER — LEVOTHYROXINE SODIUM 25 UG/1
25 TABLET ORAL DAILY
Status: DISCONTINUED | OUTPATIENT
Start: 2023-10-20 | End: 2023-10-20

## 2023-10-19 RX ORDER — ATORVASTATIN CALCIUM 10 MG/1
20 TABLET, FILM COATED ORAL DAILY
Status: DISCONTINUED | OUTPATIENT
Start: 2023-10-20 | End: 2023-10-31 | Stop reason: HOSPADM

## 2023-10-19 RX ORDER — SIMETHICONE 80 MG
1 TABLET,CHEWABLE ORAL 4 TIMES DAILY PRN
Status: DISCONTINUED | OUTPATIENT
Start: 2023-10-19 | End: 2023-10-31 | Stop reason: HOSPADM

## 2023-10-19 RX ORDER — NALOXONE HCL 0.4 MG/ML
0.02 VIAL (ML) INJECTION
Status: DISCONTINUED | OUTPATIENT
Start: 2023-10-19 | End: 2023-10-31 | Stop reason: HOSPADM

## 2023-10-19 RX ORDER — TRAZODONE HYDROCHLORIDE 100 MG/1
100 TABLET ORAL NIGHTLY
Status: DISCONTINUED | OUTPATIENT
Start: 2023-10-19 | End: 2023-10-31 | Stop reason: HOSPADM

## 2023-10-19 RX ORDER — SODIUM CHLORIDE 0.9 % (FLUSH) 0.9 %
3 SYRINGE (ML) INJECTION EVERY 12 HOURS PRN
Status: DISCONTINUED | OUTPATIENT
Start: 2023-10-19 | End: 2023-10-31 | Stop reason: HOSPADM

## 2023-10-19 RX ORDER — POTASSIUM CHLORIDE 20 MEQ/1
40 TABLET, EXTENDED RELEASE ORAL
Status: COMPLETED | OUTPATIENT
Start: 2023-10-19 | End: 2023-10-19

## 2023-10-19 RX ORDER — METOPROLOL SUCCINATE 25 MG/1
25 TABLET, EXTENDED RELEASE ORAL 2 TIMES DAILY
Status: DISCONTINUED | OUTPATIENT
Start: 2023-10-20 | End: 2023-10-20

## 2023-10-19 RX ORDER — METOPROLOL SUCCINATE 50 MG/1
50 TABLET, EXTENDED RELEASE ORAL
COMMUNITY
Start: 2023-10-04

## 2023-10-19 RX ORDER — DILTIAZEM HYDROCHLORIDE 5 MG/ML
5 INJECTION INTRAVENOUS EVERY 6 HOURS PRN
Status: DISCONTINUED | OUTPATIENT
Start: 2023-10-19 | End: 2023-10-22

## 2023-10-19 RX ORDER — TALC
3 POWDER (GRAM) TOPICAL NIGHTLY
Status: DISCONTINUED | OUTPATIENT
Start: 2023-10-19 | End: 2023-10-31 | Stop reason: HOSPADM

## 2023-10-19 RX ADMIN — POTASSIUM CHLORIDE 40 MEQ: 1500 TABLET, EXTENDED RELEASE ORAL at 04:10

## 2023-10-19 RX ADMIN — POTASSIUM CHLORIDE 10 MEQ: 7.46 INJECTION, SOLUTION INTRAVENOUS at 05:10

## 2023-10-19 RX ADMIN — SENNOSIDES AND DOCUSATE SODIUM 1 TABLET: 8.6; 5 TABLET ORAL at 08:10

## 2023-10-19 RX ADMIN — MELATONIN TAB 3 MG 3 MG: 3 TAB at 08:10

## 2023-10-19 RX ADMIN — FUROSEMIDE 60 MG: 10 INJECTION, SOLUTION INTRAMUSCULAR; INTRAVENOUS at 03:10

## 2023-10-19 RX ADMIN — RIVAROXABAN 20 MG: 20 TABLET, FILM COATED ORAL at 08:10

## 2023-10-19 RX ADMIN — FUROSEMIDE 40 MG: 10 INJECTION, SOLUTION INTRAMUSCULAR; INTRAVENOUS at 08:10

## 2023-10-19 RX ADMIN — POTASSIUM BICARBONATE 25 MEQ: 978 TABLET, EFFERVESCENT ORAL at 08:10

## 2023-10-19 RX ADMIN — CALCIUM CARBONATE (ANTACID) CHEW TAB 500 MG 500 MG: 500 CHEW TAB at 08:10

## 2023-10-19 RX ADMIN — CALCIUM GLUCONATE 1 G: 98 INJECTION, SOLUTION INTRAVENOUS at 04:10

## 2023-10-19 RX ADMIN — TRAZODONE HYDROCHLORIDE 100 MG: 100 TABLET ORAL at 08:10

## 2023-10-19 RX ADMIN — DIVALPROEX SODIUM 250 MG: 250 TABLET, DELAYED RELEASE ORAL at 08:10

## 2023-10-19 NOTE — ED PROVIDER NOTES
SCRIBE #1 NOTE: I, Geneva Frias, am scribing for, and in the presence of, Silverio Patterson MD. I have scribed the HPI, ROS, and PEx.     SCRIBE #2 NOTE: I, Sonia Mercado, am scribing for, and in the presence of,  Silverio Patterson MD. I have scribed the remaining portions of the note not scribed by Scribe #1.     History      Chief Complaint   Patient presents with    Leg Swelling     Pt has swelling to legs and abdomen, also sob, denies chest pain. Hx of CHF and A-fib. Pt from [facility] under a PEC/CEC       Review of patient's allergies indicates:   Allergen Reactions    Pcn [penicillins] Itching        HPI   HPI    10/19/2023, 2:42 PM   History obtained from the patient      History of Present Illness: Alejandra Strong is a 63 y.o. female patient with a PMHx of a-fib on Xarelto, DM, HLD, hypothyroid, CHF on Lasix, bipolar 1 disorder, CKD, and HTN who presents to the Emergency Department from [facility] under a PEC/CEC for bilateral leg swelling up to the abdominal wall which onset a couple of weeks ago. Symptoms are constant and moderate in severity. No mitigating or exacerbating factors reported. Associated sxs include SOB, CP, fatigue, and abdominal distention. Patient denies any fever, chills, N/V/D, diaphoresis, weakness, and all other sxs at this time. No prior Tx reported. No further complaints or concerns at this time.         Arrival mode: EMS    PCP: No, Primary Doctor       Past Medical History:  Past Medical History:   Diagnosis Date    Bipolar disorder, unspecified     CHF (congestive heart failure)     CKD (chronic kidney disease)     Diabetes mellitus     Hypertension     Hypothyroidism, unspecified     Paroxysmal atrial fibrillation        Past Surgical History:  History reviewed. No pertinent surgical history.      Family History:  History reviewed. No pertinent family history.    Social History:  Social History     Tobacco Use    Smoking status: Never    Smokeless tobacco: Never    Substance and Sexual Activity    Alcohol use: Not on file    Drug use: Not Currently    Sexual activity: Not on file       ROS   Review of Systems   Constitutional:  Positive for fatigue. Negative for chills, diaphoresis and fever.   HENT:  Negative for sore throat.    Respiratory:  Positive for shortness of breath.    Cardiovascular:  Positive for chest pain and leg swelling (bilateral).   Gastrointestinal:  Positive for abdominal distention. Negative for diarrhea, nausea and vomiting.   Genitourinary:  Negative for dysuria.   Musculoskeletal:  Negative for back pain.   Skin:  Negative for rash.   Neurological:  Negative for weakness.   Hematological:  Does not bruise/bleed easily.   All other systems reviewed and are negative.      Physical Exam      Initial Vitals   BP Pulse Resp Temp SpO2   10/19/23 1412 10/19/23 1412 10/19/23 1412 10/19/23 1500 10/19/23 1412   120/72 (!) 146 16 98.6 °F (37 °C) 100 %      MAP       --                 Physical Exam  Nursing Notes and Vital Signs Reviewed.  Constitutional: Patient is in no acute distress. Well-developed and well-nourished.  Head: Atraumatic. Normocephalic.  Eyes: PERRL. EOM intact. Conjunctivae are not pale. No scleral icterus.  ENT: Mucous membranes are moist. Oropharynx is clear and symmetric.    Neck: Supple. Full ROM. No lymphadenopathy.  Cardiovascular: Tachycardic rate. Irregular irregular rhythm. No murmurs, rubs, or gallops. Distal pulses are 2+ and symmetric.  Pulmonary/Chest: No respiratory distress. Clear to auscultation bilaterally. No wheezing or rales.  Abdominal: Soft and non-distended.  There is no tenderness.  No rebound, guarding, or rigidity.   Musculoskeletal: Moves all extremities. No obvious deformities. There is 2+ BLE pitting edema up to the sacrum.  Skin: Warm and dry.  Neurological:  Alert, awake, and appropriate.  Normal speech.  No acute focal neurological deficits are appreciated.  Psychiatric: Normal affect. Good eye contact.  Appropriate in content.    ED Course    Procedures  ED Vital Signs:  Vitals:    10/19/23 1412 10/19/23 1432 10/19/23 1500 10/19/23 1600   BP: 120/72  108/78 116/89   Pulse: (!) 146 98 108 107   Resp: 16  15 17   Temp:   98.6 °F (37 °C) 98.6 °F (37 °C)   SpO2: 100%  97% 97%    10/19/23 1630   BP:    Pulse:    Resp:    Temp:    SpO2: 95%       Abnormal Lab Results:  Labs Reviewed   CBC W/ AUTO DIFFERENTIAL - Abnormal; Notable for the following components:       Result Value    RBC 3.08 (*)     Hemoglobin 7.2 (*)     Hematocrit 25.4 (*)     MCH 23.4 (*)     MCHC 28.3 (*)     RDW 17.2 (*)     Immature Granulocytes 0.9 (*)     Immature Grans (Abs) 0.05 (*)     Lymph # 0.8 (*)     Lymph % 14.2 (*)     All other components within normal limits   COMPREHENSIVE METABOLIC PANEL - Abnormal; Notable for the following components:    Potassium 2.7 (*)     Chloride 118 (*)     CO2 18 (*)     Calcium 5.4 (*)     Total Protein 4.2 (*)     Albumin 1.8 (*)     Alkaline Phosphatase 40 (*)     Anion Gap 6 (*)     All other components within normal limits    Narrative:       potassium, calcium critical result(s) called and verbal readback   obtained from tana kwok rn by [facility] 10/19/2023 15:32   B-TYPE NATRIURETIC PEPTIDE - Abnormal; Notable for the following components:     (*)     All other components within normal limits   PROTIME-INR - Abnormal; Notable for the following components:    Prothrombin Time 18.9 (*)     INR 1.8 (*)     All other components within normal limits   TROPONIN I        All Lab Results:  Results for orders placed or performed during the hospital encounter of 10/19/23   CBC auto differential   Result Value Ref Range    WBC 5.29 3.90 - 12.70 K/uL    RBC 3.08 (L) 4.00 - 5.40 M/uL    Hemoglobin 7.2 (L) 12.0 - 16.0 g/dL    Hematocrit 25.4 (L) 37.0 - 48.5 %    MCV 83 82 - 98 fL    MCH 23.4 (L) 27.0 - 31.0 pg    MCHC 28.3 (L) 32.0 - 36.0 g/dL    RDW 17.2 (H) 11.5 - 14.5 %    Platelets 151 150 - 450 K/uL    MPV  10.6 9.2 - 12.9 fL    Immature Granulocytes 0.9 (H) 0.0 - 0.5 %    Gran # (ANC) 3.7 1.8 - 7.7 K/uL    Immature Grans (Abs) 0.05 (H) 0.00 - 0.04 K/uL    Lymph # 0.8 (L) 1.0 - 4.8 K/uL    Mono # 0.7 0.3 - 1.0 K/uL    Eos # 0.0 0.0 - 0.5 K/uL    Baso # 0.04 0.00 - 0.20 K/uL    nRBC 0 0 /100 WBC    Gran % 70.1 38.0 - 73.0 %    Lymph % 14.2 (L) 18.0 - 48.0 %    Mono % 13.2 4.0 - 15.0 %    Eosinophil % 0.8 0.0 - 8.0 %    Basophil % 0.8 0.0 - 1.9 %    Differential Method Automated    Comprehensive metabolic panel   Result Value Ref Range    Sodium 142 136 - 145 mmol/L    Potassium 2.7 (LL) 3.5 - 5.1 mmol/L    Chloride 118 (H) 95 - 110 mmol/L    CO2 18 (L) 23 - 29 mmol/L    Glucose 88 70 - 110 mg/dL    BUN 20 8 - 23 mg/dL    Creatinine 0.8 0.5 - 1.4 mg/dL    Calcium 5.4 (LL) 8.7 - 10.5 mg/dL    Total Protein 4.2 (L) 6.0 - 8.4 g/dL    Albumin 1.8 (L) 3.5 - 5.2 g/dL    Total Bilirubin 0.3 0.1 - 1.0 mg/dL    Alkaline Phosphatase 40 (L) 55 - 135 U/L    AST 21 10 - 40 U/L    ALT 13 10 - 44 U/L    eGFR >60 >60 mL/min/1.73 m^2    Anion Gap 6 (L) 8 - 16 mmol/L   Troponin I   Result Value Ref Range    Troponin I 0.012 0.000 - 0.026 ng/mL   Brain natriuretic peptide   Result Value Ref Range     (H) 0 - 99 pg/mL   Protime-INR   Result Value Ref Range    Prothrombin Time 18.9 (H) 9.0 - 12.5 sec    INR 1.8 (H) 0.8 - 1.2         Imaging Results:  Imaging Results              X-Ray Chest AP Portable (Final result)  Result time 10/19/23 15:01:29      Final result by Blayne Mena MD (10/19/23 15:01:29)                   Impression:      No acute process seen.      Electronically signed by: Blayne Mena MD  Date:    10/19/2023  Time:    15:01               Narrative:    EXAMINATION:  XR CHEST AP PORTABLE    CLINICAL HISTORY:  CHF;    FINDINGS:  Single view of the chest.    10/09/2023 comparison    Cardiac silhouette is enlarged.  Moderate pulmonary vascular congestion.  Basilar interstitial edema suspected.  The lungs  demonstrate no evidence of active disease.  No evidence of pleural effusion or pneumothorax.  Bones appear intact.  Moderate degenerative changes and moderate atherosclerotic disease.                                     The EKG was ordered, reviewed, and independently interpreted by the ED provider.  Interpretation time: 14:29  Rate: 114 BPM  Rhythm: atrial flutter with variable A-V block  Interpretation: Nonspecific T wave abnormality. No STEMI.             The Emergency Provider reviewed the vital signs and test results, which are outlined above.    ED Discussion         ED Course as of 10/19/23 1724   Thu Oct 19, 2023   1636 BNP(!): 912 [GLADYS]   1636 Troponin I: 0.012 [GLADYS]   1636 Potassium(!!): 2.7 [GLADYS]   1636 CO2(!): 18 [GLADYS]   1636 BUN: 20 [GLADYS]   1636 Creatinine: 0.8 [GLADYS]   1636 Calcium(!!): 5.4 [GLADYS]   1636 WBC: 5.29 [GLADYS]   1636 Hemoglobin(!): 7.2 [GLADYS]   1636 Hematocrit(!): 25.4 [GLADYS]      ED Course User Index  [GLADYS] Silverio Patterson MD       ED Medication(s):  Medications   furosemide injection 60 mg (60 mg Intravenous Given 10/19/23 1500)   calcium gluconate 100 mg/mL (10%) injection 1 g (1 g Intravenous Given 10/19/23 1655)   potassium chloride 10 mEq in 100 mL IVPB (10 mEq Intravenous New Bag 10/19/23 1705)   potassium chloride SA CR tablet 40 mEq (40 mEq Oral Given 10/19/23 1655)           New Prescriptions    No medications on file         Medical Decision Making    Medical Decision Making  Problems Addressed:  Hypocalcemia: acute illness or injury that poses a threat to life or bodily functions  Hypokalemia: acute illness or injury that poses a threat to life or bodily functions  Paroxysmal atrial fibrillation: chronic illness or injury  Shortness of breath: acute illness or injury that poses a threat to life or bodily functions    Amount and/or Complexity of Data Reviewed  External Data Reviewed: radiology.     Details: Reviewed pt's ECHO from 10/9/23: There is moderately reduced systolic function with a  "visually estimated ejection fraction of 35 - 40%.  I also reviewed notes from the psychiatric facility regarding her past medical history  Labs: ordered. Decision-making details documented in ED Course.  Radiology: ordered. Decision-making details documented in ED Course.     Details: Mild pulmonary edema  ECG/medicine tests: ordered and independent interpretation performed. Decision-making details documented in ED Course.     Details: No STEMI  Discussion of management or test interpretation with external provider(s): I discussed the case with Dr. White with Hospital Medicine and she agrees with the current management and will place the patient in observation.  She will handle all additional orders.    Risk  Prescription drug management.  Decision regarding hospitalization.  Diagnosis or treatment significantly limited by social determinants of health.  Risk Details: Patient comes emergency department from psychiatric facility PEC'd due to increased edema/CHF exacerbation with a history of:· Paroxysmal atrial fibrillation (on Xarelto) · Bipolar disorder · Dyslipidemia · Essential hypertension · Hypothyroidism · Iron deficiency anemia · PAD (peripheral artery disease) · Atrial fibrillation with rapid ventricular response · Chest pain · Elevated troponin I level · Tachycardia.   She was recently discharged from the hospital medicine service 10/8 and sent to a psychiatric facility.   Echo from that stay: " · Rhythm is atrial flutter. · Left Ventricle: The left ventricle is normal in size. Normal wall thickness. There is moderately reduced systolic function with a visually estimated ejection fraction of 35 - 40%. · Left Atrium: Left atrium is moderately dilated. · Right Ventricle: Mild right ventricular enlargement. Systolic function is mildly reduced"   Patient is anemic at 7.2/25.4 but that is close to her baseline. Potassium is low at 2.7, calcium is low at 5.4 (albumin 1.8 so corrected calcium around 7.16). BNP " is elevated to 912 (down from 1993 on October 10th). INR is 1.8 and chest x-ray shows vascular congestion some pulmonary edema. She has 2+ pitting edema to her sacrum and I have given her Lasix IV, calcium gluconate, and potassium IV/p.o.. I feel she needs observation, diuresis, serial cardiac enzymes.    Differential diagnosis includes CHF, ACS, COVID, infection, nephrotic syndrome, etc.    Critical Care  Total time providing critical care: 50 minutes                Scribe Attestation:   Scribe #1: I performed the above scribed service and the documentation accurately describes the services I performed. I attest to the accuracy of the note.    Attending:   Physician Attestation Statement for Scribe #1: I, Silverio Patterson MD, personally performed the services described in this documentation, as scribed by Geneva Frias, in my presence, and it is both accurate and complete.       Scribe Attestation:   Scribe #2: I performed the above scribed service and the documentation accurately describes the services I performed. I attest to the accuracy of the note.    Attending Attestation:         Attending Critical Care:   Critical Care Times:   Direct Patient Care (initial evaluation, reassessments, and time considering the case)................................................................15 minutes.   Additional History from reviewing old medical records or taking additional history from the family, EMS, PCP, etc.......................10 minutes.   Ordering, Reviewing, and Interpreting Diagnostic Studies...............................................................................................................10 minutes.   Documentation..................................................................................................................................................................................10 minutes.   Consultation with other Physicians.  .................................................................................................................................................5 minutes.   ==============================================================  Total Critical Care Time - exclusive of procedural time: 50 minutes.  ==============================================================  Critical care was necessary to treat or prevent imminent or life-threatening deterioration of the following conditions: metabolic crisis.   Critical care was time spent personally by me on the following activities: obtaining history from patient or relative, examination of patient, review of old charts, ordering lab, x-rays, and/or EKG, development of treatment plan with patient or relative, ordering and performing treatments and interventions, evaluation of patient's response to treatment, discussions with primary provider, interpretation of cardiac measurements and re-evaluation of patient's conition.   Critical Care Condition: potentially life-threatening     Physician Attestation for Scribe:    Physician Attestation Statement for Vilmae #2: I, Silverio Patterson MD, reviewed documentation, as scribed by Sonia Mercado in my presence, and it is both accurate and complete. I also acknowledge and confirm the content of the note done by Dianneibmike #1.          Clinical Impression       ICD-10-CM ICD-9-CM   1. Acute on chronic systolic congestive heart failure  I50.23 428.23     428.0   2. Shortness of breath  R06.02 786.05   3. Hypokalemia  E87.6 276.8   4. Hypocalcemia  E83.51 275.41   5. Paroxysmal atrial fibrillation  I48.0 427.31   6. Bipolar affective disorder, remission status unspecified  F31.9 296.80       Disposition:   Disposition: Placed in Observation  Condition: Stable         Silverio Patterson MD  10/19/23 1727       Silverio Patterson MD  11/07/23 1435

## 2023-10-19 NOTE — ASSESSMENT & PLAN NOTE
Patient with Long standing persistent (>12 months) atrial fibrillation which is uncontrolled currently with Beta Blocker and Amiodarone. Patient is currently in atrial fibrillation.[id number] Score: 1. HASBLED Score: . Anticoagulation indicated. Anticoagulation done with Xarelto     Continue outpatient amiodarone and metoprolol  We will consult Cardiology in a.m. for help in management.

## 2023-10-19 NOTE — ASSESSMENT & PLAN NOTE
Patient admitted from [facility]  She has a diagnosis of bipolar just is under [facility] is reportedly homeless and there prior to admission had auditory and visual hallucinations currently she was hyperactive with delusions and unable to understand her situation or take her medications.  She was started on Depakote  mg b.i.d. Zoloft 100 mg daily trazodone 100 mg q.h.s. was just recently started on Risperdal 1 mg p.o. b.i.d. for visual hallucinations.  At the time they were considering nursing home versus group home placement    We will consult telepsych in a.m. for evaluation and assistance with medication

## 2023-10-19 NOTE — Clinical Note
Diagnosis: Shortness of breath [786.05.ICD-9-CM]   Future Attending Provider: MARGARITA MORTENSEN [[id number]]   Admitting Provider:: MARGARITA MORTENSEN [[id number]]   Special Needs:: PEC/CEC [35]

## 2023-10-19 NOTE — SUBJECTIVE & OBJECTIVE
Past Medical History:   Diagnosis Date    Anticoagulant long-term use     Bipolar disorder, unspecified     Bipolar disorder, unspecified     CHF (congestive heart failure)     CKD (chronic kidney disease)     Diabetes mellitus     Hypertension     Hypothyroidism, unspecified     Paroxysmal atrial fibrillation        History reviewed. No pertinent surgical history.    Review of patient's allergies indicates:   Allergen Reactions    Pcn [penicillins] Itching       No current facility-administered medications on file prior to encounter.     Current Outpatient Medications on File Prior to Encounter   Medication Sig    metoprolol succinate (TOPROL-XL) 50 MG 24 hr tablet Take 50 mg by mouth.    amiodarone (PACERONE) 200 MG Tab Take 1 tablet (200 mg total) by mouth once daily.    amiodarone (PACERONE) 200 MG Tab Take 1 tablet (200 mg total) by mouth once daily.    atorvastatin (LIPITOR) 20 MG tablet Take 1 tablet (20 mg total) by mouth once daily.    benazepriL (LOTENSIN) 5 MG tablet Take 2 tablets (10 mg total) by mouth once daily.    diltiaZEM (CARDIZEM CD) 180 MG 24 hr capsule Take 1 capsule (180 mg total) by mouth 2 (two) times daily.    diltiaZEM (CARDIZEM CD) 180 MG 24 hr capsule Take 180 mg by mouth 2 (two) times a day.    divalproex (DEPAKOTE) 250 MG EC tablet Take 1 tablet (250 mg total) by mouth every 12 (twelve) hours.    EScitalopram oxalate (LEXAPRO) 10 MG tablet Take 1 tablet (10 mg total) by mouth once daily.    furosemide (LASIX) 20 MG tablet Take 1 tablet (20 mg total) by mouth every morning.    isosorbide mononitrate (IMDUR) 30 MG 24 hr tablet Take 1 tablet (30 mg total) by mouth once daily.    levothyroxine (SYNTHROID) 25 MCG tablet Take 1 tablet (25 mcg total) by mouth once daily.    metoprolol succinate (TOPROL-XL) 50 MG 24 hr tablet Take 1 tablet (50 mg total) by mouth once daily.    rivaroxaban (XARELTO) 20 mg Tab Take 1 tablet (20 mg total) by mouth daily with dinner or evening meal.    sertraline  (ZOLOFT) 100 MG tablet Take 1 tablet (100 mg total) by mouth once daily.    traZODone (DESYREL) 100 MG tablet Take 1 tablet (100 mg total) by mouth every evening.     Family History       Problem Relation (Age of Onset)    Hypertension Mother          Tobacco Use    Smoking status: Every Day     Types: Cigarettes    Smokeless tobacco: Never   Substance and Sexual Activity    Alcohol use: Not Currently    Drug use: Not on file     Comment: UDS on admission to OSH + amphetimines    Sexual activity: Not on file     Review of Systems   Constitutional:  Positive for activity change and fatigue. Negative for fever.   HENT:  Positive for dental problem.    Eyes:  Positive for redness.   Respiratory:  Negative for cough and shortness of breath.    Cardiovascular:  Positive for palpitations and leg swelling. Negative for chest pain.   Gastrointestinal:  Negative for nausea and vomiting.   Endocrine: Positive for polyuria.   Psychiatric/Behavioral:  Positive for behavioral problems, decreased concentration and dysphoric mood.    All other systems reviewed and are negative.    Objective:     Vital Signs (Most Recent):  Temp: 98.6 °F (37 °C) (10/19/23 1600)  Pulse: 107 (10/19/23 1600)  Resp: 17 (10/19/23 1600)  BP: 116/89 (10/19/23 1600)  SpO2: 95 % (10/19/23 1630) Vital Signs (24h Range):  Temp:  [98.6 °F (37 °C)] 98.6 °F (37 °C)  Pulse:  [] 107  Resp:  [15-17] 17  SpO2:  [95 %-100 %] 95 %  BP: (108-120)/(72-89) 116/89        There is no height or weight on file to calculate BMI.     Physical Exam  Vitals reviewed.   Constitutional:       Appearance: She is obese. She is ill-appearing (Chronically ill-appearing unkempt).   HENT:      Head: Normocephalic and atraumatic.      Mouth/Throat:      Mouth: Mucous membranes are moist.      Pharynx: Oropharynx is clear.   Eyes:      General:         Left eye: Discharge present.     Extraocular Movements: Extraocular movements intact.      Conjunctiva/sclera: Conjunctivae normal.    Cardiovascular:      Rate and Rhythm: Tachycardia present. Rhythm irregular.      Pulses: Normal pulses.      Heart sounds: Normal heart sounds.   Pulmonary:      Effort: Pulmonary effort is normal.      Breath sounds: Normal breath sounds.   Abdominal:      General: Bowel sounds are normal. There is no distension.      Palpations: Abdomen is soft.      Tenderness: There is no abdominal tenderness. There is no guarding or rebound.   Musculoskeletal:         General: Normal range of motion.      Cervical back: Normal range of motion and neck supple.      Right lower leg: Edema (to mid thigh) present.      Left lower leg: Edema (to mid thigh) present.   Skin:     General: Skin is warm and dry.      Coloration: Skin is pale.   Neurological:      General: No focal deficit present.      Mental Status: She is alert. She is disoriented.                Significant Labs: All pertinent labs within the past 24 hours have been reviewed.  A1C:   Recent Labs   Lab 10/09/23  0501   HGBA1C 5.8     CBC:   Recent Labs   Lab 10/19/23  1450   WBC 5.29   HGB 7.2*   HCT 25.4*        CMP:   Recent Labs   Lab 10/19/23  1450      K 2.7*   *   CO2 18*   GLU 88   BUN 20   CREATININE 0.8   CALCIUM 5.4*   PROT 4.2*   ALBUMIN 1.8*   BILITOT 0.3   ALKPHOS 40*   AST 21   ALT 13   ANIONGAP 6*     Cardiac Markers:   Recent Labs   Lab 10/19/23  1450   *     Coagulation:   Recent Labs   Lab 10/19/23  1450   INR 1.8*       Troponin:   Recent Labs   Lab 10/19/23  1450   TROPONINI 0.012     TSH:   Recent Labs   Lab 10/05/23  1708   TSH 13.631*         Significant Imaging: I have reviewed all pertinent imaging results/findings within the past 24 hours.

## 2023-10-19 NOTE — H&P
[facility] - Emergency Dept[facility]  [facility]  History & Physical    Patient Name: Alejandra Strong  MRN: [id number]  Patient Class: OP- Observation  Admission Date: 10/19/2023  Attending Physician: Debora White MD   Primary Care Provider: Mer, Primary Doctor         Patient information was obtained from patient, past medical records and ER records.     Subjective:     Principal Problem:<principal problem not specified>    Chief Complaint:   Chief Complaint   Patient presents with    Leg Swelling     Pt has swelling to legs and abdomen, also sob, denies chest pain. Hx of CHF and A-fib. Pt from [facility] under a PEC/CEC        HPI: Patient is a 63-year-old female who has  has a past medical history of Anticoagulant long-term use, Bipolar disorder unspecified, CHF (congestive heart failure) reduced ejection fraction, CKD 3 (chronic kidney disease), Diabetes mellitus type 2, Hypertension, Hypothyroidism, unspecified, and Paroxysmal atrial fibrillation.     Patient initially presented to [facility] with complaints of visual and auditory hallucinations.  She was noted to be in AFib/flutter with RVR.  She was admitted to the hospital under pec her rate was controlled with amiodarone, Toprol-XL 50 mg a day and she was discharged to [facility] for further treatment of her bipolar disorder.  She she was discharged 10/11/20[facility] to [facility].  She is been seen by PCP and Psychiatry there until today when she was noted to have significant swelling in her legs and feet and sent under a pec/CEC for evaluation.  In the emergency department she was noted to be in AFib/flutter rate of 114.  She was also noted to have potassium of 2.7, calcium of 5.4 corrected to 7.2 and a BNP at 9:12 a.m. which is down from 1993 on October 10th.  She was given IV Lasix calcium and potassium in the emergency department.  Her initial troponin was negative.  Patient is seen lethargic but arousable.  Knows she is in  the hospital, the date, as well as her birth date.  She denies any chest pain or shortness a breath.    She is admitted under a pec/CEC will need a sitter and will consult psych for help with her meds in a.m.      Past Medical History:   Diagnosis Date    Anticoagulant long-term use     Bipolar disorder, unspecified     Bipolar disorder, unspecified     CHF (congestive heart failure)     CKD (chronic kidney disease)     Diabetes mellitus     Hypertension     Hypothyroidism, unspecified     Paroxysmal atrial fibrillation        History reviewed. No pertinent surgical history.    Review of patient's allergies indicates:   Allergen Reactions    Pcn [penicillins] Itching       No current facility-administered medications on file prior to encounter.     Current Outpatient Medications on File Prior to Encounter   Medication Sig    metoprolol succinate (TOPROL-XL) 50 MG 24 hr tablet Take 50 mg by mouth.    amiodarone (PACERONE) 200 MG Tab Take 1 tablet (200 mg total) by mouth once daily.    amiodarone (PACERONE) 200 MG Tab Take 1 tablet (200 mg total) by mouth once daily.    atorvastatin (LIPITOR) 20 MG tablet Take 1 tablet (20 mg total) by mouth once daily.    benazepriL (LOTENSIN) 5 MG tablet Take 2 tablets (10 mg total) by mouth once daily.    diltiaZEM (CARDIZEM CD) 180 MG 24 hr capsule Take 1 capsule (180 mg total) by mouth 2 (two) times daily.    diltiaZEM (CARDIZEM CD) 180 MG 24 hr capsule Take 180 mg by mouth 2 (two) times a day.    divalproex (DEPAKOTE) 250 MG EC tablet Take 1 tablet (250 mg total) by mouth every 12 (twelve) hours.    EScitalopram oxalate (LEXAPRO) 10 MG tablet Take 1 tablet (10 mg total) by mouth once daily.    furosemide (LASIX) 20 MG tablet Take 1 tablet (20 mg total) by mouth every morning.    isosorbide mononitrate (IMDUR) 30 MG 24 hr tablet Take 1 tablet (30 mg total) by mouth once daily.    levothyroxine (SYNTHROID) 25 MCG tablet Take 1 tablet (25 mcg total) by mouth  once daily.    metoprolol succinate (TOPROL-XL) 50 MG 24 hr tablet Take 1 tablet (50 mg total) by mouth once daily.    rivaroxaban (XARELTO) 20 mg Tab Take 1 tablet (20 mg total) by mouth daily with dinner or evening meal.    sertraline (ZOLOFT) 100 MG tablet Take 1 tablet (100 mg total) by mouth once daily.    traZODone (DESYREL) 100 MG tablet Take 1 tablet (100 mg total) by mouth every evening.     Family History       Problem Relation (Age of Onset)    Hypertension Mother          Tobacco Use    Smoking status: Every Day     Types: Cigarettes    Smokeless tobacco: Never   Substance and Sexual Activity    Alcohol use: Not Currently    Drug use: Not on file     Comment: UDS on admission to OSH + amphetimines    Sexual activity: Not on file     Review of Systems   Constitutional:  Positive for activity change and fatigue. Negative for fever.   HENT:  Positive for dental problem.    Eyes:  Positive for redness.   Respiratory:  Negative for cough and shortness of breath.    Cardiovascular:  Positive for palpitations and leg swelling. Negative for chest pain.   Gastrointestinal:  Negative for nausea and vomiting.   Endocrine: Positive for polyuria.   Psychiatric/Behavioral:  Positive for behavioral problems, decreased concentration and dysphoric mood.    All other systems reviewed and are negative.    Objective:     Vital Signs (Most Recent):  Temp: 98.6 °F (37 °C) (10/19/23 1600)  Pulse: 107 (10/19/23 1600)  Resp: 17 (10/19/23 1600)  BP: 116/89 (10/19/23 1600)  SpO2: 95 % (10/19/23 1630) Vital Signs (24h Range):  Temp:  [98.6 °F (37 °C)] 98.6 °F (37 °C)  Pulse:  [] 107  Resp:  [15-17] 17  SpO2:  [95 %-100 %] 95 %  BP: (108-120)/(72-89) 116/89        There is no height or weight on file to calculate BMI.     Physical Exam  Vitals reviewed.   Constitutional:       Appearance: She is obese. She is ill-appearing (Chronically ill-appearing unkempt).   HENT:      Head: Normocephalic and atraumatic.       Mouth/Throat:      Mouth: Mucous membranes are moist.      Pharynx: Oropharynx is clear.   Eyes:      General:         Left eye: Discharge present.     Extraocular Movements: Extraocular movements intact.      Conjunctiva/sclera: Conjunctivae normal.   Cardiovascular:      Rate and Rhythm: Tachycardia present. Rhythm irregular.      Pulses: Normal pulses.      Heart sounds: Normal heart sounds.   Pulmonary:      Effort: Pulmonary effort is normal.      Breath sounds: Normal breath sounds.   Abdominal:      General: Bowel sounds are normal. There is no distension.      Palpations: Abdomen is soft.      Tenderness: There is no abdominal tenderness. There is no guarding or rebound.   Musculoskeletal:         General: Normal range of motion.      Cervical back: Normal range of motion and neck supple.      Right lower leg: Edema (to mid thigh) present.      Left lower leg: Edema (to mid thigh) present.   Skin:     General: Skin is warm and dry.      Coloration: Skin is pale.   Neurological:      General: No focal deficit present.      Mental Status: She is alert. She is disoriented.                Significant Labs: All pertinent labs within the past 24 hours have been reviewed.  A1C:   Recent Labs   Lab 10/09/23  0501   HGBA1C 5.8     CBC:   Recent Labs   Lab 10/19/23  1450   WBC 5.29   HGB 7.2*   HCT 25.4*        CMP:   Recent Labs   Lab 10/19/23  1450      K 2.7*   *   CO2 18*   GLU 88   BUN 20   CREATININE 0.8   CALCIUM 5.4*   PROT 4.2*   ALBUMIN 1.8*   BILITOT 0.3   ALKPHOS 40*   AST 21   ALT 13   ANIONGAP 6*     Cardiac Markers:   Recent Labs   Lab 10/19/23  1450   *     Coagulation:   Recent Labs   Lab 10/19/23  1450   INR 1.8*       Troponin:   Recent Labs   Lab 10/19/23  1450   TROPONINI 0.012     TSH:   Recent Labs   Lab 10/05/23  1708   TSH 13.631*         Significant Imaging: I have reviewed all pertinent imaging results/findings within the past 24 hours.    Assessment/Plan:     Acute  on chronic combined systolic and diastolic congestive heart failure  Patient is identified as having Combined Systolic and Diastolic heart failure that is Acute on chronic. CHF is currently uncontrolled due to Continued edema of extremities. Latest ECHO performed and demonstrates- Results for orders placed during the hospital encounter of 10/08/23    Echo    Interpretation Summary    Rhythm is atrial flutter.    Left Ventricle: The left ventricle is normal in size. Normal wall thickness. There is moderately reduced systolic function with a visually estimated ejection fraction of 35 - 40%.    Left Atrium: Left atrium is moderately dilated.    Right Ventricle: Mild right ventricular enlargement. Systolic function is mildly reduced.    Right Atrium: Right atrium is moderately dilated.    Aortic Valve: The aortic valve is a trileaflet valve. There is mild aortic valve sclerosis.    Mitral Valve: There is mild to moderate regurgitation.    Tricuspid Valve: There is moderate regurgitation.    Pulmonic Valve: There is mild regurgitation.    Pulmonary Artery: The estimated pulmonary artery systolic pressure is 51 mmHg.    IVC/SVC: Elevated venous pressure at 15 mmHg.    Pericardium: There is a trivial effusion.  . Continue Beta Blocker and Furosemide and monitor clinical status closely. Monitor on telemetry. Patient is off CHF pathway.  Monitor strict Is&Os and daily weights.  Place on fluid restriction of 1.5 L. Cardiology has not been any consulted. Continue to stress to patient importance of self efficacy and  on diet for CHF. Last BNP reviewed- and noted below   Recent Labs   Lab 10/19/23  1450   *   .    Atrial fibrillation with rapid ventricular response  Patient with Long standing persistent (>12 months) atrial fibrillation which is uncontrolled currently with Beta Blocker and Amiodarone. Patient is currently in atrial fibrillation.[id number] Score: 1. HASBLED Score: . Anticoagulation  indicated. Anticoagulation done with Xarelto     Continue outpatient amiodarone and metoprolol  We will consult Cardiology in a.m. for help in management.    Hypothyroidism  Patient currently on replacement dose we will continue and recheck in a.m.      Essential hypertension  Currently on Toprol-XL 50 daily  We will monitor add hydralazine if needed IV      Dyslipidemia  Continue patient's outpatient statin      Bipolar disorder  Patient admitted from [facility]  She has a diagnosis of bipolar just is under [facility] is reportedly homeless and there prior to admission had auditory and visual hallucinations currently she was hyperactive with delusions and unable to understand her situation or take her medications.  She was started on Depakote  mg b.i.d. Zoloft 100 mg daily trazodone 100 mg q.h.s. was just recently started on Risperdal 1 mg p.o. b.i.d. for visual hallucinations.  At the time they were considering nursing home versus group home placement    We will consult telepsych in a.m. for evaluation and assistance with medication        VTE Risk Mitigation (From admission, onward)         Ordered     rivaroxaban tablet 20 mg  With dinner         10/19/23 1756     Reason for No Pharmacological VTE Prophylaxis  Once        Question:  Reasons:  Answer:  Already adequately anticoagulated on oral Anticoagulants    10/19/23 1750     IP VTE HIGH RISK PATIENT  Once         10/19/23 1750     Place sequential compression device  Until discontinued         10/19/23 1750                   On 10/19/2023, patient should be placed in hospital observation services under my care.            Debora Trinh MD  Department of Hospital Medicine  [facility] - Emergency Dept.

## 2023-10-19 NOTE — HPI
Patient is a 63-year-old female who has  has a past medical history of Anticoagulant long-term use, Bipolar disorder unspecified, CHF (congestive heart failure) reduced ejection fraction, CKD 3 (chronic kidney disease), Diabetes mellitus type 2, Hypertension, Hypothyroidism, unspecified, and Paroxysmal atrial fibrillation.     Patient initially presented to [facility] with complaints of visual and auditory hallucinations.  She was noted to be in AFib/flutter with RVR.  She was admitted to the hospital under pec her rate was controlled with amiodarone, Toprol-XL 50 mg a day [facility].  She she was discharged 10/11/20[facility] to [facility].  She is been seen by PCP and Psychiatry there until today when she was noted to have significant swelling in her legs and feet and sent under a pec/CEC for evaluation.  In the emergency department she was noted to be in AFib/flutter rate of 114.  She was also noted to have potassium of 2.7, calcium of 5.4 corrected to 7.2 and a BNP at 9:12 a.m. which is down from 1993 on October 10th.  She was given IV Lasix calcium and potassium in the emergency department.  Her initial troponin was negative.  Patient is seen lethargic but arousable.  Knows she is in the hospital, the date, as well as her birth date.  She denies any chest pain or shortness a breath.    She is admitted under a pec/CEC will need a sitter and will consult psych for help with her meds in a.m.

## 2023-10-19 NOTE — ASSESSMENT & PLAN NOTE
Patient is identified as having Combined Systolic and Diastolic heart failure that is Acute on chronic. CHF is currently uncontrolled due to Continued edema of extremities. Latest ECHO performed and demonstrates- Results for orders placed during the hospital encounter of 10/08/23    Echo    Interpretation Summary    Rhythm is atrial flutter.    Left Ventricle: The left ventricle is normal in size. Normal wall thickness. There is moderately reduced systolic function with a visually estimated ejection fraction of 35 - 40%.    Left Atrium: Left atrium is moderately dilated.    Right Ventricle: Mild right ventricular enlargement. Systolic function is mildly reduced.    Right Atrium: Right atrium is moderately dilated.    Aortic Valve: The aortic valve is a trileaflet valve. There is mild aortic valve sclerosis.    Mitral Valve: There is mild to moderate regurgitation.    Tricuspid Valve: There is moderate regurgitation.    Pulmonic Valve: There is mild regurgitation.    Pulmonary Artery: The estimated pulmonary artery systolic pressure is 51 mmHg.    IVC/SVC: Elevated venous pressure at 15 mmHg.    Pericardium: There is a trivial effusion.  . Continue Beta Blocker and Furosemide and monitor clinical status closely. Monitor on telemetry. Patient is off CHF pathway.  Monitor strict Is&Os and daily weights.  Place on fluid restriction of 1.5 L. Cardiology has not been any consulted. Continue to stress to patient importance of self efficacy and  on diet for CHF. Last BNP reviewed- and noted below   Recent Labs   Lab 10/19/23  1450   *   .

## 2023-10-20 LAB
ALBUMIN SERPL BCP-MCNC: 2.6 G/DL (ref 3.5–5.2)
ALP SERPL-CCNC: 50 U/L (ref 55–135)
ALT SERPL W/O P-5'-P-CCNC: 18 U/L (ref 10–44)
AMPHET+METHAMPHET UR QL: NEGATIVE
ANION GAP SERPL CALC-SCNC: 9 MMOL/L (ref 8–16)
AST SERPL-CCNC: 30 U/L (ref 10–40)
BARBITURATES UR QL SCN>200 NG/ML: NEGATIVE
BASOPHILS # BLD AUTO: 0.04 K/UL (ref 0–0.2)
BASOPHILS NFR BLD: 0.9 % (ref 0–1.9)
BENZODIAZ UR QL SCN>200 NG/ML: NEGATIVE
BILIRUB SERPL-MCNC: 0.5 MG/DL (ref 0.1–1)
BILIRUB UR QL STRIP: NEGATIVE
BUN SERPL-MCNC: 28 MG/DL (ref 8–23)
BZE UR QL SCN: NEGATIVE
CALCIUM SERPL-MCNC: 8.6 MG/DL (ref 8.7–10.5)
CANNABINOIDS UR QL SCN: NEGATIVE
CHLORIDE SERPL-SCNC: 104 MMOL/L (ref 95–110)
CLARITY UR: CLEAR
CO2 SERPL-SCNC: 27 MMOL/L (ref 23–29)
COLOR UR: YELLOW
CREAT SERPL-MCNC: 1.4 MG/DL (ref 0.5–1.4)
CREAT UR-MCNC: 12.9 MG/DL (ref 15–325)
DIFFERENTIAL METHOD: ABNORMAL
EOSINOPHIL # BLD AUTO: 0.1 K/UL (ref 0–0.5)
EOSINOPHIL NFR BLD: 1.9 % (ref 0–8)
ERYTHROCYTE [DISTWIDTH] IN BLOOD BY AUTOMATED COUNT: 17.4 % (ref 11.5–14.5)
EST. GFR  (NO RACE VARIABLE): 42 ML/MIN/1.73 M^2
ESTIMATED AVG GLUCOSE: 128 MG/DL (ref 68–131)
GLUCOSE SERPL-MCNC: 118 MG/DL (ref 70–110)
GLUCOSE UR QL STRIP: NEGATIVE
HBA1C MFR BLD: 6.1 % (ref 4–5.6)
HCT VFR BLD AUTO: 27.8 % (ref 37–48.5)
HGB BLD-MCNC: 7.8 G/DL (ref 12–16)
HGB UR QL STRIP: NEGATIVE
IMM GRANULOCYTES # BLD AUTO: 0.03 K/UL (ref 0–0.04)
IMM GRANULOCYTES NFR BLD AUTO: 0.7 % (ref 0–0.5)
IRON SERPL-MCNC: 17 UG/DL (ref 30–160)
KETONES UR QL STRIP: NEGATIVE
LEUKOCYTE ESTERASE UR QL STRIP: NEGATIVE
LYMPHOCYTES # BLD AUTO: 0.7 K/UL (ref 1–4.8)
LYMPHOCYTES NFR BLD: 16.1 % (ref 18–48)
MAGNESIUM SERPL-MCNC: 1.7 MG/DL (ref 1.6–2.6)
MCH RBC QN AUTO: 23.4 PG (ref 27–31)
MCHC RBC AUTO-ENTMCNC: 28.1 G/DL (ref 32–36)
MCV RBC AUTO: 84 FL (ref 82–98)
METHADONE UR QL SCN>300 NG/ML: NEGATIVE
MONOCYTES # BLD AUTO: 0.6 K/UL (ref 0.3–1)
MONOCYTES NFR BLD: 14 % (ref 4–15)
NEUTROPHILS # BLD AUTO: 2.8 K/UL (ref 1.8–7.7)
NEUTROPHILS NFR BLD: 66.4 % (ref 38–73)
NITRITE UR QL STRIP: NEGATIVE
NRBC BLD-RTO: 0 /100 WBC
OPIATES UR QL SCN: NEGATIVE
PCP UR QL SCN>25 NG/ML: NEGATIVE
PH UR STRIP: 6 [PH] (ref 5–8)
PHOSPHATE SERPL-MCNC: 4.3 MG/DL (ref 2.7–4.5)
PLATELET # BLD AUTO: 144 K/UL (ref 150–450)
PMV BLD AUTO: 10.8 FL (ref 9.2–12.9)
POTASSIUM SERPL-SCNC: 4.4 MMOL/L (ref 3.5–5.1)
PROT SERPL-MCNC: 6.3 G/DL (ref 6–8.4)
PROT UR QL STRIP: NEGATIVE
RBC # BLD AUTO: 3.33 M/UL (ref 4–5.4)
SATURATED IRON: 8 % (ref 20–50)
SODIUM SERPL-SCNC: 140 MMOL/L (ref 136–145)
SP GR UR STRIP: 1.01 (ref 1–1.03)
T4 FREE SERPL-MCNC: 0.68 NG/DL (ref 0.71–1.51)
TOTAL IRON BINDING CAPACITY: 226 UG/DL (ref 250–450)
TOXICOLOGY INFORMATION: ABNORMAL
TRANSFERRIN SERPL-MCNC: 153 MG/DL (ref 200–375)
TSH SERPL DL<=0.005 MIU/L-ACNC: 17.81 UIU/ML (ref 0.4–4)
URN SPEC COLLECT METH UR: NORMAL
UROBILINOGEN UR STRIP-ACNC: NEGATIVE EU/DL
WBC # BLD AUTO: 4.22 K/UL (ref 3.9–12.7)

## 2023-10-20 PROCEDURE — 63600175 PHARM REV CODE 636 W HCPCS

## 2023-10-20 PROCEDURE — 25000003 PHARM REV CODE 250: Performed by: INTERNAL MEDICINE

## 2023-10-20 PROCEDURE — 80307 DRUG TEST PRSMV CHEM ANLYZR: CPT | Performed by: INTERNAL MEDICINE

## 2023-10-20 PROCEDURE — 85025 COMPLETE CBC W/AUTO DIFF WBC: CPT | Performed by: INTERNAL MEDICINE

## 2023-10-20 PROCEDURE — 99205 PR OFFICE/OUTPT VISIT, NEW, LEVL V, 60-74 MIN: ICD-10-PCS | Mod: 95,AF,HB, | Performed by: STUDENT IN AN ORGANIZED HEALTH CARE EDUCATION/TRAINING PROGRAM

## 2023-10-20 PROCEDURE — 84443 ASSAY THYROID STIM HORMONE: CPT | Performed by: INTERNAL MEDICINE

## 2023-10-20 PROCEDURE — 21400001 HC TELEMETRY ROOM

## 2023-10-20 PROCEDURE — 83735 ASSAY OF MAGNESIUM: CPT | Performed by: INTERNAL MEDICINE

## 2023-10-20 PROCEDURE — 84100 ASSAY OF PHOSPHORUS: CPT | Performed by: INTERNAL MEDICINE

## 2023-10-20 PROCEDURE — 99205 OFFICE O/P NEW HI 60 MIN: CPT | Mod: 95,AF,HB, | Performed by: STUDENT IN AN ORGANIZED HEALTH CARE EDUCATION/TRAINING PROGRAM

## 2023-10-20 PROCEDURE — 99900035 HC TECH TIME PER 15 MIN (STAT)

## 2023-10-20 PROCEDURE — 94799 UNLISTED PULMONARY SVC/PX: CPT

## 2023-10-20 PROCEDURE — 63600175 PHARM REV CODE 636 W HCPCS: Performed by: INTERNAL MEDICINE

## 2023-10-20 PROCEDURE — 25000003 PHARM REV CODE 250: Performed by: STUDENT IN AN ORGANIZED HEALTH CARE EDUCATION/TRAINING PROGRAM

## 2023-10-20 PROCEDURE — 99223 1ST HOSP IP/OBS HIGH 75: CPT | Mod: ,,, | Performed by: STUDENT IN AN ORGANIZED HEALTH CARE EDUCATION/TRAINING PROGRAM

## 2023-10-20 PROCEDURE — 81003 URINALYSIS AUTO W/O SCOPE: CPT | Mod: 59 | Performed by: INTERNAL MEDICINE

## 2023-10-20 PROCEDURE — 99223 PR INITIAL HOSPITAL CARE,LEVL III: ICD-10-PCS | Mod: ,,, | Performed by: STUDENT IN AN ORGANIZED HEALTH CARE EDUCATION/TRAINING PROGRAM

## 2023-10-20 PROCEDURE — 80053 COMPREHEN METABOLIC PANEL: CPT | Performed by: INTERNAL MEDICINE

## 2023-10-20 PROCEDURE — 84439 ASSAY OF FREE THYROXINE: CPT | Performed by: INTERNAL MEDICINE

## 2023-10-20 RX ORDER — METOPROLOL TARTRATE 25 MG/1
25 TABLET, FILM COATED ORAL ONCE
Status: DISCONTINUED | OUTPATIENT
Start: 2023-10-20 | End: 2023-10-21

## 2023-10-20 RX ORDER — OLANZAPINE 2.5 MG/1
2.5 TABLET ORAL EVERY 8 HOURS PRN
Status: DISCONTINUED | OUTPATIENT
Start: 2023-10-20 | End: 2023-10-31 | Stop reason: HOSPADM

## 2023-10-20 RX ORDER — SERTRALINE HYDROCHLORIDE 50 MG/1
50 TABLET, FILM COATED ORAL DAILY
Status: DISCONTINUED | OUTPATIENT
Start: 2023-10-21 | End: 2023-10-31 | Stop reason: HOSPADM

## 2023-10-20 RX ORDER — METOPROLOL TARTRATE 25 MG/1
25 TABLET, FILM COATED ORAL 3 TIMES DAILY
Status: DISCONTINUED | OUTPATIENT
Start: 2023-10-20 | End: 2023-10-20

## 2023-10-20 RX ORDER — MAGNESIUM SULFATE HEPTAHYDRATE 40 MG/ML
2 INJECTION, SOLUTION INTRAVENOUS ONCE
Status: COMPLETED | OUTPATIENT
Start: 2023-10-20 | End: 2023-10-20

## 2023-10-20 RX ORDER — FUROSEMIDE 10 MG/ML
40 INJECTION INTRAMUSCULAR; INTRAVENOUS DAILY
Status: DISCONTINUED | OUTPATIENT
Start: 2023-10-21 | End: 2023-10-21

## 2023-10-20 RX ORDER — METOPROLOL TARTRATE 50 MG/1
50 TABLET ORAL EVERY 6 HOURS
Status: DISCONTINUED | OUTPATIENT
Start: 2023-10-20 | End: 2023-10-21

## 2023-10-20 RX ORDER — LEVOTHYROXINE SODIUM 50 UG/1
50 TABLET ORAL
Status: DISCONTINUED | OUTPATIENT
Start: 2023-10-20 | End: 2023-10-31 | Stop reason: HOSPADM

## 2023-10-20 RX ORDER — DILTIAZEM HYDROCHLORIDE 5 MG/ML
5 INJECTION INTRAVENOUS ONCE
Status: COMPLETED | OUTPATIENT
Start: 2023-10-20 | End: 2023-10-20

## 2023-10-20 RX ORDER — TOBRAMYCIN AND DEXAMETHASONE 3; 1 MG/ML; MG/ML
1 SUSPENSION/ DROPS OPHTHALMIC
Status: DISCONTINUED | OUTPATIENT
Start: 2023-10-20 | End: 2023-10-28

## 2023-10-20 RX ADMIN — AMIODARONE HYDROCHLORIDE 200 MG: 200 TABLET ORAL at 08:10

## 2023-10-20 RX ADMIN — POTASSIUM BICARBONATE 25 MEQ: 978 TABLET, EFFERVESCENT ORAL at 09:10

## 2023-10-20 RX ADMIN — DIVALPROEX SODIUM 250 MG: 250 TABLET, DELAYED RELEASE ORAL at 09:10

## 2023-10-20 RX ADMIN — AMIODARONE HYDROCHLORIDE 150 MG: 1.5 INJECTION, SOLUTION INTRAVENOUS at 01:10

## 2023-10-20 RX ADMIN — POTASSIUM BICARBONATE 25 MEQ: 978 TABLET, EFFERVESCENT ORAL at 08:10

## 2023-10-20 RX ADMIN — SENNOSIDES AND DOCUSATE SODIUM 1 TABLET: 8.6; 5 TABLET ORAL at 08:10

## 2023-10-20 RX ADMIN — TOBRAMYCIN AND DEXAMETHASONE 1 DROP: 3; 1 SUSPENSION/ DROPS OPHTHALMIC at 09:10

## 2023-10-20 RX ADMIN — DIVALPROEX SODIUM 250 MG: 250 TABLET, DELAYED RELEASE ORAL at 08:10

## 2023-10-20 RX ADMIN — IRON SUCROSE 200 MG: 20 INJECTION, SOLUTION INTRAVENOUS at 08:10

## 2023-10-20 RX ADMIN — DILTIAZEM HYDROCHLORIDE 5 MG: 5 INJECTION INTRAVENOUS at 08:10

## 2023-10-20 RX ADMIN — METOPROLOL TARTRATE 50 MG: 50 TABLET, FILM COATED ORAL at 11:10

## 2023-10-20 RX ADMIN — METOPROLOL TARTRATE 25 MG: 25 TABLET, FILM COATED ORAL at 08:10

## 2023-10-20 RX ADMIN — POTASSIUM BICARBONATE 25 MEQ: 978 TABLET, EFFERVESCENT ORAL at 04:10

## 2023-10-20 RX ADMIN — SERTRALINE HYDROCHLORIDE 100 MG: 50 TABLET ORAL at 08:10

## 2023-10-20 RX ADMIN — SENNOSIDES AND DOCUSATE SODIUM 1 TABLET: 8.6; 5 TABLET ORAL at 09:10

## 2023-10-20 RX ADMIN — FUROSEMIDE 40 MG: 10 INJECTION, SOLUTION INTRAMUSCULAR; INTRAVENOUS at 06:10

## 2023-10-20 RX ADMIN — CALCIUM CARBONATE (ANTACID) CHEW TAB 500 MG 500 MG: 500 CHEW TAB at 09:10

## 2023-10-20 RX ADMIN — MELATONIN TAB 3 MG 3 MG: 3 TAB at 09:10

## 2023-10-20 RX ADMIN — TRAZODONE HYDROCHLORIDE 100 MG: 100 TABLET ORAL at 09:10

## 2023-10-20 RX ADMIN — AMIODARONE HYDROCHLORIDE 0.5 MG/MIN: 1.8 INJECTION, SOLUTION INTRAVENOUS at 07:10

## 2023-10-20 RX ADMIN — AMIODARONE HYDROCHLORIDE 1 MG/MIN: 1.8 INJECTION, SOLUTION INTRAVENOUS at 01:10

## 2023-10-20 RX ADMIN — POLYETHYLENE GLYCOL 3350 17 G: 17 POWDER, FOR SOLUTION ORAL at 08:10

## 2023-10-20 RX ADMIN — LEVOTHYROXINE SODIUM 50 MCG: 50 TABLET ORAL at 08:10

## 2023-10-20 RX ADMIN — MAGNESIUM SULFATE HEPTAHYDRATE 2 G: 40 INJECTION, SOLUTION INTRAVENOUS at 08:10

## 2023-10-20 RX ADMIN — ATORVASTATIN CALCIUM 20 MG: 10 TABLET, FILM COATED ORAL at 08:10

## 2023-10-20 RX ADMIN — CALCIUM CARBONATE (ANTACID) CHEW TAB 500 MG 500 MG: 500 CHEW TAB at 08:10

## 2023-10-20 RX ADMIN — RIVAROXABAN 15 MG: 15 TABLET, FILM COATED ORAL at 04:10

## 2023-10-20 NOTE — ASSESSMENT & PLAN NOTE
Echo with EF 35-40%  Cont OMT metoprolol, IV diuresis, ACEi as BP tolerates  Strict I/Os  Low Na diet

## 2023-10-20 NOTE — ASSESSMENT & PLAN NOTE
Patient with Long standing persistent (>12 months) atrial fibrillation which is uncontrolled currently with Beta Blocker and Amiodarone. Patient is currently in atrial fibrillation.[id number] Score: 1. HASBLED Score: . Anticoagulation indicated. Anticoagulation done with Xarelto     Continue outpatient amiodarone and metoprolol  Appreciate cardiology consult.  Patient was placed on amiodarone drip

## 2023-10-20 NOTE — CONSULTS
[facility] - Telemetry ([facility])  Cardiology  Consult Note    Patient Name: Alejandra Strong  MRN: [id number]  Admission Date: 10/19/2023  Hospital Length of Stay: 0 days  Code Status: Full Code   Attending Provider: Debora White MD   Consulting Provider: Abbey Villegas NP  Primary Care Physician: No, Primary Doctor  Principal Problem:<principal problem not specified>    Patient information was obtained from patient and ER records.     Inpatient consult to Cardiology  Consult performed by: Abbey Villegas NP  Consult ordered by: Debora White MD        Subjective:     Chief Complaint:  Visual disturbances     HPI:   Patient is a 63-year-old female who has  has a past medical history of Anticoagulant long-term use, Bipolar disorder unspecified, CHF (congestive heart failure) reduced ejection fraction, CKD 3 (chronic kidney disease), Diabetes mellitus type 2, Hypertension, Hypothyroidism, unspecified, and Paroxysmal atrial fibrillation.      Patient initially presented to [facility] with complaints of visual and auditory hallucinations.  She was noted to be in AFib/flutter with RVR.  She was admitted to the hospital under pec her rate was controlled with amiodarone, Toprol-XL 50 mg a day and she was discharged to [facility] for further treatment of her bipolar disorder.  She she was discharged 10/11/[facility].  She is been seen by PCP and Psychiatry there until today when she was noted to have significant swelling in her legs and feet and sent under a pec/CEC for evaluation.  In the emergency department she was noted to be in AFib/flutter rate of 114.  She was also noted to have potassium of 2.7, calcium of 5.4 corrected to 7.2 and a BNP at 9:12 a.m. which is down from 1993 on October 10th.  She was given IV Lasix calcium and potassium in the emergency department.  Her initial troponin was negative.  Patient is seen lethargic but arousable.  Knows she  is in the hospital, the date, as well as her birth date.  She denies any chest pain or shortness a breath.     Cardiology consulted to assist with management. Pt seen and examined today, resting in bed, sitter in room. Pt denies and SOB, reports occasional palpitations. H/o bipolar/psych issues pt not the best historian chart reviewed. Tele reviewed, Afib RVR. Labs reviewed, H/H 7.8 and 27.8 Plt 144, K 2.7, Echo 10/9/23 35-40%.      Past Medical History:   Diagnosis Date    Anticoagulant long-term use     Bipolar disorder, unspecified     Bipolar disorder, unspecified     CHF (congestive heart failure)     CKD (chronic kidney disease)     Diabetes mellitus     Hypertension     Hypothyroidism, unspecified     Paroxysmal atrial fibrillation        History reviewed. No pertinent surgical history.    Review of patient's allergies indicates:   Allergen Reactions    Pcn [penicillins] Itching       No current facility-administered medications on file prior to encounter.     Current Outpatient Medications on File Prior to Encounter   Medication Sig    metoprolol succinate (TOPROL-XL) 50 MG 24 hr tablet Take 50 mg by mouth.    amiodarone (PACERONE) 200 MG Tab Take 1 tablet (200 mg total) by mouth once daily.    amiodarone (PACERONE) 200 MG Tab Take 1 tablet (200 mg total) by mouth once daily.    atorvastatin (LIPITOR) 20 MG tablet Take 1 tablet (20 mg total) by mouth once daily.    benazepriL (LOTENSIN) 5 MG tablet Take 2 tablets (10 mg total) by mouth once daily.    diltiaZEM (CARDIZEM CD) 180 MG 24 hr capsule Take 1 capsule (180 mg total) by mouth 2 (two) times daily.    diltiaZEM (CARDIZEM CD) 180 MG 24 hr capsule Take 180 mg by mouth 2 (two) times a day.    divalproex (DEPAKOTE) 250 MG EC tablet Take 1 tablet (250 mg total) by mouth every 12 (twelve) hours.    EScitalopram oxalate (LEXAPRO) 10 MG tablet Take 1 tablet (10 mg total) by mouth once daily.    furosemide (LASIX) 20 MG tablet Take 1 tablet  (20 mg total) by mouth every morning.    isosorbide mononitrate (IMDUR) 30 MG 24 hr tablet Take 1 tablet (30 mg total) by mouth once daily.    levothyroxine (SYNTHROID) 25 MCG tablet Take 1 tablet (25 mcg total) by mouth once daily.    metoprolol succinate (TOPROL-XL) 50 MG 24 hr tablet Take 1 tablet (50 mg total) by mouth once daily.    rivaroxaban (XARELTO) 20 mg Tab Take 1 tablet (20 mg total) by mouth daily with dinner or evening meal.    sertraline (ZOLOFT) 100 MG tablet Take 1 tablet (100 mg total) by mouth once daily.    traZODone (DESYREL) 100 MG tablet Take 1 tablet (100 mg total) by mouth every evening.     Family History       Problem Relation (Age of Onset)    Hypertension Mother          Tobacco Use    Smoking status: Every Day     Types: Cigarettes    Smokeless tobacco: Never   Substance and Sexual Activity    Alcohol use: Not Currently    Drug use: Not on file     Comment: UDS on admission to OSH + amphetimines    Sexual activity: Not on file     Review of Systems   Constitutional: Negative.   HENT: Negative.     Eyes: Negative.    Cardiovascular:  Positive for chest pain and palpitations.   Respiratory: Negative.     Skin: Negative.    Musculoskeletal: Negative.    Gastrointestinal: Negative.    Genitourinary: Negative.    Neurological: Negative.    Psychiatric/Behavioral: Negative.       Objective:     Vital Signs (Most Recent):  Temp: 98.2 °F (36.8 °C) (10/20/23 0822)  Pulse: (!) 140 (10/20/23 0822)  Resp: 18 (10/20/23 0822)  BP: 119/88 (10/20/23 0822)  SpO2: 97 % (10/20/23 0822) Vital Signs (24h Range):  Temp:  [97.4 °F (36.3 °C)-98.6 °F (37 °C)] 98.2 °F (36.8 °C)  Pulse:  [] 140  Resp:  [12-20] 18  SpO2:  [94 %-100 %] 97 %  BP: (108-150)/(72-89) 119/88     Weight: 73.7 kg (162 lb 7.7 oz)  Body mass index is 28.78 kg/m².    SpO2: 97 %         Intake/Output Summary (Last 24 hours) at 10/20/2023 1244  Last data filed at 10/20/2023 1042  Gross per 24 hour   Intake --   Output 2200 ml  "  Net -2200 ml       Lines/Drains/Airways       Peripheral Intravenous Line  Duration                  Peripheral IV - Single Lumen 10/19/23 1413 18 G Left Antecubital <1 day                     Physical Exam  Vitals and nursing note reviewed.   Constitutional:       Appearance: Normal appearance.   HENT:      Head: Normocephalic.   Eyes:      Pupils: Pupils are equal, round, and reactive to light.   Cardiovascular:      Rate and Rhythm: Tachycardia present. Rhythm irregular.      Heart sounds: Normal heart sounds, S1 normal and S2 normal. No murmur heard.     No S3 or S4 sounds.   Pulmonary:      Effort: Pulmonary effort is normal.      Breath sounds: Normal breath sounds.   Abdominal:      General: Bowel sounds are normal.      Palpations: Abdomen is soft.   Musculoskeletal:         General: Normal range of motion.      Cervical back: Normal range of motion.   Skin:     Capillary Refill: Capillary refill takes less than 2 seconds.   Neurological:      General: No focal deficit present.      Mental Status: She is alert and oriented to person, place, and time.   Psychiatric:         Mood and Affect: Mood normal.         Behavior: Behavior normal.         Thought Content: Thought content normal.          Significant Labs: BMP:   Recent Labs   Lab 10/19/23  1450 10/20/23  0529   GLU 88 118*    140   K 2.7* 4.4   * 104   CO2 18* 27   BUN 20 28*   CREATININE 0.8 1.4   CALCIUM 5.4* 8.6*   MG  --  1.7   , CMP   Recent Labs   Lab 10/19/23  1450 10/20/23  0529    140   K 2.7* 4.4   * 104   CO2 18* 27   GLU 88 118*   BUN 20 28*   CREATININE 0.8 1.4   CALCIUM 5.4* 8.6*   PROT 4.2* 6.3   ALBUMIN 1.8* 2.6*   BILITOT 0.3 0.5   ALKPHOS 40* 50*   AST 21 30   ALT 13 18   ANIONGAP 6* 9   , CBC   Recent Labs   Lab 10/19/23  1450 10/20/23  0529   WBC 5.29 4.22   HGB 7.2* 7.8*   HCT 25.4* 27.8*    144*   , INR   Recent Labs   Lab 10/19/23  1450   INR 1.8*   , Lipid Panel No results for input(s): "CHOL", " ""HDL", "LDLCALC", "TRIG", "CHOLHDL" in the last 48 hours., Troponin   Recent Labs   Lab 10/19/23  1450 10/19/23  2018   TROPONINI 0.012 0.014   , and All pertinent lab results from the last 24 hours have been reviewed.    Significant Imaging: Cardiac Cath: reviewed, Echocardiogram: Transthoracic echo (TTE) complete (Cupid Only):   Results for orders placed or performed during the hospital encounter of 10/08/23   Echo   Result Value Ref Range    BSA 1.73 m2    LVOT stroke volume 34.90 cm3    LVIDd 4.34 3.5 - 6.0 cm    LV Systolic Volume 53.86 mL    LV Systolic Volume Index 31.5 mL/m2    LVIDs 3.58 2.1 - 4.0 cm    LV Diastolic Volume 84.71 mL    LV Diastolic Volume Index 49.54 mL/m2    IVS 1.48 (A) 0.6 - 1.1 cm    LVOT diameter 2.13 cm    LVOT area 3.6 cm2    FS 18 (A) 28 - 44 %    Left Ventricle Relative Wall Thickness 0.52 cm    Posterior Wall 1.13 (A) 0.6 - 1.1 cm    LV mass 211.89 g    LV Mass Index 124 g/m2    MV Peak E Kaden 0.88 m/s    TDI LATERAL 0.09 m/s    TDI SEPTAL 0.06 m/s    E/E' ratio 11.73 m/s    MV Peak A Kaden 0.51 m/s    TR Max Kaden 2.99 m/s    E/A ratio 1.73     E wave deceleration time 58.22 msec    LV SEPTAL E/E' RATIO 14.67 m/s    LV LATERAL E/E' RATIO 9.78 m/s    LVOT peak kaden 0.65 m/s    Left Ventricular Outflow Tract Mean Velocity 0.45 cm/s    Left Ventricular Outflow Tract Mean Gradient 0.95 mmHg    LA size 4.75 cm    RVDD 3.11 cm    AV mean gradient 3 mmHg    AV peak gradient 5 mmHg    Ao peak kaden 1.15 m/s    Ao VTI 15.30 cm    LVOT peak VTI 9.80 cm    AV valve area 2.28 cm²    AV Velocity Ratio 0.57     AV index (prosthetic) 0.64     SIDNEY by Velocity Ratio 2.01 cm²    MV mean gradient 2 mmHg    MV peak gradient 5 mmHg    MV stenosis pressure 1/2 time 16.88 ms    MV valve area p 1/2 method 13.03 cm2    MV valve area by continuity eq 2.08 cm2    MV VTI 16.8 cm    Triscuspid Valve Regurgitation Peak Gradient 36 mmHg    Mean e' 0.08 m/s    ZLVIDS 1.56     ZLVIDD -0.90     TV resting pulmonary artery " pressure 51 mmHg    RV TB RVSP 18 mmHg    Est. RA pres 15 mmHg    Narrative      Rhythm is atrial flutter.    Left Ventricle: The left ventricle is normal in size. Normal wall   thickness. There is moderately reduced systolic function with a visually   estimated ejection fraction of 35 - 40%.    Left Atrium: Left atrium is moderately dilated.    Right Ventricle: Mild right ventricular enlargement. Systolic function   is mildly reduced.    Right Atrium: Right atrium is moderately dilated.    Aortic Valve: The aortic valve is a trileaflet valve. There is mild   aortic valve sclerosis.    Mitral Valve: There is mild to moderate regurgitation.    Tricuspid Valve: There is moderate regurgitation.    Pulmonic Valve: There is mild regurgitation.    Pulmonary Artery: The estimated pulmonary artery systolic pressure is   51 mmHg.    IVC/SVC: Elevated venous pressure at 15 mmHg.    Pericardium: There is a trivial effusion.     , EKG: reviewed, Stress Test: reviewed, and X-Ray: CXR: X-Ray Chest 1 View (CXR): No results found for this visit on 10/19/23.    Assessment and Plan:     Acute on chronic combined systolic and diastolic congestive heart failure    Echo with EF 35-40%  Cont OMT metoprolol, IV diuresis, ACEi as BP tolerates  Strict I/Os  Low Na diet    Atrial fibrillation with rapid ventricular response  Tele reviewed, afib HR 140s  Cont xarelto  Start Amio gtt, can DC PO Amio          Essential hypertension  stable    Dyslipidemia  statin        VTE Risk Mitigation (From admission, onward)         Ordered     rivaroxaban tablet 15 mg  With dinner         10/20/23 0938     Reason for No Pharmacological VTE Prophylaxis  Once        Question:  Reasons:  Answer:  Already adequately anticoagulated on oral Anticoagulants    10/19/23 1750     IP VTE HIGH RISK PATIENT  Once         10/19/23 1750     Place sequential compression device  Until discontinued         10/19/23 1750                Thank you for your  consult. I will follow-up with patient. Please contact us if you have any additional questions.    Abbey Villegas NP  Cardiology   [facility] - Telemetry ([facility]

## 2023-10-20 NOTE — PT/OT/SLP PROGRESS
Physical Therapy      Patient Name:  Alejandra Strong   MRN:  [id number]    PVIV OVALLES INITIATED THIS AM VIA CHART REVIEW, PT CURRENTLY WITH ELEVATED HR/TACHYCARDIA   BPM, NOTIFIED NURSE [place], WILL HOLD TILL HR MORE STABLE, WILL CONTINUE EFFORTS    Desiree Ferrara, PT  10/20/2023  0800

## 2023-10-20 NOTE — ASSESSMENT & PLAN NOTE
Patient admitted from [facility]  She has a diagnosis of bipolar just is under [facility] is reportedly homeless and there prior to admission had auditory and visual hallucinations currently she was hyperactive with delusions and unable to understand her situation or take her medications.  She was started on Depakote  mg b.i.d. Zoloft 100 mg daily trazodone 100 mg q.h.s. was just recently started on Risperdal 1 mg p.o. b.i.d. for visual hallucinations.  At the time they were considering nursing home versus group home placement    Appreciate telepsych consult meds were adjusted per their recommendations

## 2023-10-20 NOTE — SUBJECTIVE & OBJECTIVE
Interval History:  Patient reports feeling well.  She has some shortness of breath.  Some weakness.  She states she is ready to go home.  Heart rate is remaining elevated.    Review of Systems   Constitutional:  Negative for fatigue and fever.   Respiratory:  Positive for shortness of breath. Negative for cough.    Cardiovascular:  Positive for leg swelling. Negative for chest pain.   Gastrointestinal:  Negative for nausea and vomiting.   Neurological:  Positive for weakness.   Psychiatric/Behavioral:  Positive for behavioral problems and confusion. Agitation: Patient admitted under pec/CEC continued per Psychiatry.   All other systems reviewed and are negative.    Objective:     Vital Signs (Most Recent):  Temp: 98.6 °F (37 °C) (10/20/23 1320)  Pulse: (!) 118 (10/20/23 1320)  Resp: 16 (10/20/23 1320)  BP: 111/80 (10/20/23 1320)  SpO2: 95 % (10/20/23 1320) Vital Signs (24h Range):  Temp:  [97.4 °F (36.3 °C)-98.6 °F (37 °C)] 98.6 °F (37 °C)  Pulse:  [109-140] 118  Resp:  [12-20] 16  SpO2:  [94 %-98 %] 95 %  BP: (111-150)/(74-88) 111/80     Weight: 73.7 kg (162 lb 7.7 oz)  Body mass index is 28.78 kg/m².    Intake/Output Summary (Last 24 hours) at 10/20/2023 1649  Last data filed at 10/20/2023 1621  Gross per 24 hour   Intake --   Output 2550 ml   Net -2550 ml         Physical Exam  Vitals reviewed.   Constitutional:       Appearance: She is obese. She is ill-appearing (chronically).   HENT:      Head: Normocephalic and atraumatic.      Mouth/Throat:      Mouth: Mucous membranes are moist.      Pharynx: Oropharynx is clear.   Eyes:      General:         Left eye: Discharge present.     Extraocular Movements: Extraocular movements intact.   Cardiovascular:      Rate and Rhythm: Tachycardia present. Rhythm irregular.      Pulses: Normal pulses.      Heart sounds: Normal heart sounds.   Pulmonary:      Effort: Pulmonary effort is normal.      Breath sounds: Normal breath sounds.   Abdominal:      General: Bowel sounds  "are normal.      Palpations: Abdomen is soft.   Musculoskeletal:         General: Normal range of motion.      Cervical back: Normal range of motion and neck supple.      Right lower leg: Edema present.      Left lower leg: Edema present.   Skin:     General: Skin is warm and dry.   Neurological:      Mental Status: She is alert and oriented to person, place, and time. Mental status is at baseline.             Significant Labs: All pertinent labs within the past 24 hours have been reviewed.  Blood Culture: No results for input(s): "LABBLOO" in the last 48 hours.  CBC:   Recent Labs   Lab 10/19/23  1450 10/20/23  0529   WBC 5.29 4.22   HGB 7.2* 7.8*   HCT 25.4* 27.8*    144*     CMP:   Recent Labs   Lab 10/19/23  1450 10/20/23  0529    140   K 2.7* 4.4   * 104   CO2 18* 27   GLU 88 118*   BUN 20 28*   CREATININE 0.8 1.4   CALCIUM 5.4* 8.6*   PROT 4.2* 6.3   ALBUMIN 1.8* 2.6*   BILITOT 0.3 0.5   ALKPHOS 40* 50*   AST 21 30   ALT 13 18   ANIONGAP 6* 9     Cardiac Markers:   Recent Labs   Lab 10/19/23  1450   *     Coagulation:   Recent Labs   Lab 10/19/23  1450   INR 1.8*     Magnesium:   Recent Labs   Lab 10/20/23  0529   MG 1.7     Troponin:   Recent Labs   Lab 10/19/23  1450 10/19/23  2018   TROPONINI 0.012 0.014     TSH:   Recent Labs   Lab 10/20/23  0529   TSH 17.812*       Significant Imaging: I have reviewed all pertinent imaging results/findings within the past 24 hours.  "

## 2023-10-20 NOTE — SUBJECTIVE & OBJECTIVE
Past Medical History:   Diagnosis Date    Anticoagulant long-term use     Bipolar disorder, unspecified     Bipolar disorder, unspecified     CHF (congestive heart failure)     CKD (chronic kidney disease)     Diabetes mellitus     Hypertension     Hypothyroidism, unspecified     Paroxysmal atrial fibrillation        History reviewed. No pertinent surgical history.    Review of patient's allergies indicates:   Allergen Reactions    Pcn [penicillins] Itching       No current facility-administered medications on file prior to encounter.     Current Outpatient Medications on File Prior to Encounter   Medication Sig    metoprolol succinate (TOPROL-XL) 50 MG 24 hr tablet Take 50 mg by mouth.    amiodarone (PACERONE) 200 MG Tab Take 1 tablet (200 mg total) by mouth once daily.    amiodarone (PACERONE) 200 MG Tab Take 1 tablet (200 mg total) by mouth once daily.    atorvastatin (LIPITOR) 20 MG tablet Take 1 tablet (20 mg total) by mouth once daily.    benazepriL (LOTENSIN) 5 MG tablet Take 2 tablets (10 mg total) by mouth once daily.    diltiaZEM (CARDIZEM CD) 180 MG 24 hr capsule Take 1 capsule (180 mg total) by mouth 2 (two) times daily.    diltiaZEM (CARDIZEM CD) 180 MG 24 hr capsule Take 180 mg by mouth 2 (two) times a day.    divalproex (DEPAKOTE) 250 MG EC tablet Take 1 tablet (250 mg total) by mouth every 12 (twelve) hours.    EScitalopram oxalate (LEXAPRO) 10 MG tablet Take 1 tablet (10 mg total) by mouth once daily.    furosemide (LASIX) 20 MG tablet Take 1 tablet (20 mg total) by mouth every morning.    isosorbide mononitrate (IMDUR) 30 MG 24 hr tablet Take 1 tablet (30 mg total) by mouth once daily.    levothyroxine (SYNTHROID) 25 MCG tablet Take 1 tablet (25 mcg total) by mouth once daily.    metoprolol succinate (TOPROL-XL) 50 MG 24 hr tablet Take 1 tablet (50 mg total) by mouth once daily.    rivaroxaban (XARELTO) 20 mg Tab Take 1 tablet (20 mg total) by mouth daily with dinner or evening meal.    sertraline  (ZOLOFT) 100 MG tablet Take 1 tablet (100 mg total) by mouth once daily.    traZODone (DESYREL) 100 MG tablet Take 1 tablet (100 mg total) by mouth every evening.     Family History       Problem Relation (Age of Onset)    Hypertension Mother          Tobacco Use    Smoking status: Every Day     Types: Cigarettes    Smokeless tobacco: Never   Substance and Sexual Activity    Alcohol use: Not Currently    Drug use: Not on file     Comment: UDS on admission to OSH + amphetimines    Sexual activity: Not on file     Review of Systems   Constitutional: Negative.   HENT: Negative.     Eyes: Negative.    Cardiovascular:  Positive for chest pain and palpitations.   Respiratory: Negative.     Skin: Negative.    Musculoskeletal: Negative.    Gastrointestinal: Negative.    Genitourinary: Negative.    Neurological: Negative.    Psychiatric/Behavioral: Negative.       Objective:     Vital Signs (Most Recent):  Temp: 98.2 °F (36.8 °C) (10/20/23 0822)  Pulse: (!) 140 (10/20/23 0822)  Resp: 18 (10/20/23 0822)  BP: 119/88 (10/20/23 0822)  SpO2: 97 % (10/20/23 0822) Vital Signs (24h Range):  Temp:  [97.4 °F (36.3 °C)-98.6 °F (37 °C)] 98.2 °F (36.8 °C)  Pulse:  [] 140  Resp:  [12-20] 18  SpO2:  [94 %-100 %] 97 %  BP: (108-150)/(72-89) 119/88     Weight: 73.7 kg (162 lb 7.7 oz)  Body mass index is 28.78 kg/m².    SpO2: 97 %         Intake/Output Summary (Last 24 hours) at 10/20/2023 1244  Last data filed at 10/20/2023 1042  Gross per 24 hour   Intake --   Output 2200 ml   Net -2200 ml       Lines/Drains/Airways       Peripheral Intravenous Line  Duration                  Peripheral IV - Single Lumen 10/19/23 1413 18 G Left Antecubital <1 day                     Physical Exam  Vitals and nursing note reviewed.   Constitutional:       Appearance: Normal appearance.   HENT:      Head: Normocephalic.   Eyes:      Pupils: Pupils are equal, round, and reactive to light.   Cardiovascular:      Rate and Rhythm: Tachycardia present.  "Rhythm irregular.      Heart sounds: Normal heart sounds, S1 normal and S2 normal. No murmur heard.     No S3 or S4 sounds.   Pulmonary:      Effort: Pulmonary effort is normal.      Breath sounds: Normal breath sounds.   Abdominal:      General: Bowel sounds are normal.      Palpations: Abdomen is soft.   Musculoskeletal:         General: Normal range of motion.      Cervical back: Normal range of motion.   Skin:     Capillary Refill: Capillary refill takes less than 2 seconds.   Neurological:      General: No focal deficit present.      Mental Status: She is alert and oriented to person, place, and time.   Psychiatric:         Mood and Affect: Mood normal.         Behavior: Behavior normal.         Thought Content: Thought content normal.          Significant Labs: BMP:   Recent Labs   Lab 10/19/23  1450 10/20/23  0529   GLU 88 118*    140   K 2.7* 4.4   * 104   CO2 18* 27   BUN 20 28*   CREATININE 0.8 1.4   CALCIUM 5.4* 8.6*   MG  --  1.7   , CMP   Recent Labs   Lab 10/19/23  1450 10/20/23  0529    140   K 2.7* 4.4   * 104   CO2 18* 27   GLU 88 118*   BUN 20 28*   CREATININE 0.8 1.4   CALCIUM 5.4* 8.6*   PROT 4.2* 6.3   ALBUMIN 1.8* 2.6*   BILITOT 0.3 0.5   ALKPHOS 40* 50*   AST 21 30   ALT 13 18   ANIONGAP 6* 9   , CBC   Recent Labs   Lab 10/19/23  1450 10/20/23  0529   WBC 5.29 4.22   HGB 7.2* 7.8*   HCT 25.4* 27.8*    144*   , INR   Recent Labs   Lab 10/19/23  1450   INR 1.8*   , Lipid Panel No results for input(s): "CHOL", "HDL", "LDLCALC", "TRIG", "CHOLHDL" in the last 48 hours., Troponin   Recent Labs   Lab 10/19/23  1450 10/19/23  2018   TROPONINI 0.012 0.014   , and All pertinent lab results from the last 24 hours have been reviewed.    Significant Imaging: Cardiac Cath: reviewed, Echocardiogram: Transthoracic echo (TTE) complete (Cupid Only):   Results for orders placed or performed during the hospital encounter of 10/08/23   Echo   Result Value Ref Range    BSA 1.73 m2    " LVOT stroke volume 34.90 cm3    LVIDd 4.34 3.5 - 6.0 cm    LV Systolic Volume 53.86 mL    LV Systolic Volume Index 31.5 mL/m2    LVIDs 3.58 2.1 - 4.0 cm    LV Diastolic Volume 84.71 mL    LV Diastolic Volume Index 49.54 mL/m2    IVS 1.48 (A) 0.6 - 1.1 cm    LVOT diameter 2.13 cm    LVOT area 3.6 cm2    FS 18 (A) 28 - 44 %    Left Ventricle Relative Wall Thickness 0.52 cm    Posterior Wall 1.13 (A) 0.6 - 1.1 cm    LV mass 211.89 g    LV Mass Index 124 g/m2    MV Peak E Kaden 0.88 m/s    TDI LATERAL 0.09 m/s    TDI SEPTAL 0.06 m/s    E/E' ratio 11.73 m/s    MV Peak A Kaden 0.51 m/s    TR Max Kaden 2.99 m/s    E/A ratio 1.73     E wave deceleration time 58.22 msec    LV SEPTAL E/E' RATIO 14.67 m/s    LV LATERAL E/E' RATIO 9.78 m/s    LVOT peak kaden 0.65 m/s    Left Ventricular Outflow Tract Mean Velocity 0.45 cm/s    Left Ventricular Outflow Tract Mean Gradient 0.95 mmHg    LA size 4.75 cm    RVDD 3.11 cm    AV mean gradient 3 mmHg    AV peak gradient 5 mmHg    Ao peak kaden 1.15 m/s    Ao VTI 15.30 cm    LVOT peak VTI 9.80 cm    AV valve area 2.28 cm²    AV Velocity Ratio 0.57     AV index (prosthetic) 0.64     SIDNEY by Velocity Ratio 2.01 cm²    MV mean gradient 2 mmHg    MV peak gradient 5 mmHg    MV stenosis pressure 1/2 time 16.88 ms    MV valve area p 1/2 method 13.03 cm2    MV valve area by continuity eq 2.08 cm2    MV VTI 16.8 cm    Triscuspid Valve Regurgitation Peak Gradient 36 mmHg    Mean e' 0.08 m/s    ZLVIDS 1.56     ZLVIDD -0.90     TV resting pulmonary artery pressure 51 mmHg    RV TB RVSP 18 mmHg    Est. RA pres 15 mmHg    Narrative      Rhythm is atrial flutter.    Left Ventricle: The left ventricle is normal in size. Normal wall   thickness. There is moderately reduced systolic function with a visually   estimated ejection fraction of 35 - 40%.    Left Atrium: Left atrium is moderately dilated.    Right Ventricle: Mild right ventricular enlargement. Systolic function   is mildly reduced.    Right Atrium: Right  atrium is moderately dilated.    Aortic Valve: The aortic valve is a trileaflet valve. There is mild   aortic valve sclerosis.    Mitral Valve: There is mild to moderate regurgitation.    Tricuspid Valve: There is moderate regurgitation.    Pulmonic Valve: There is mild regurgitation.    Pulmonary Artery: The estimated pulmonary artery systolic pressure is   51 mmHg.    IVC/SVC: Elevated venous pressure at 15 mmHg.    Pericardium: There is a trivial effusion.     , EKG: reviewed, Stress Test: reviewed, and X-Ray: CXR: X-Ray Chest 1 View (CXR): No results found for this visit on 10/19/23.

## 2023-10-20 NOTE — CONSULTS
"[facility]  Psychiatry  Telepsychiatry Consult Note    Please see previous notes:    Patient agreeable to consultation via telepsychiatry.    Tele-Consultation from Psychiatry started: 10/20/2023 at 11:18AM  The chief complaint leading to psychiatric consultation is: psychosis, history of bipolar disorder  This consultation was requested by Dr. Debora White, the  attending physician.  The location of the consulting psychiatrist is [place], [place].  The patient location is  [facility] TELEMETRY      Also present with the patient at the time of the consultation: sitter    Patient Identification:   Alejandra Strong is a 63 y.o. female.    Patient information was obtained from patient, past medical records, and primary team.    Inpatient consult to Telemedicine - Psych  Consult performed by: Rowan Nunez MD  Consult ordered by: Debora White MD  Reason for consult: psychosis, hx of bipolar        Consult Start Time: 10/20/2023 11:18 CDT          Subjective:     History of Present Illness:  Alejandra Strong is a 63 y.o. woman with a history of bipolar disorder.    Per Hospital Medicine H&P:  Patient was admitted from [facility], recently treated for Afib/flutter with RVR, discharged to a behavioral health hospital for AVH noted at time of initial presentation and re-presented to the ED from [place] for swelling of her legs and feet. She is currently under a CEC.    On interview:  "I am feeling great." She denies any issues with sleeping. She denies any issues with her mood. She denies feeling anxious at this time. When asked about family, she jumps from topic to topic and talks about her four grandchildren. She says that she has no children but then says that she has four kids and "they're all grown." She says that [facility]. When asked if she has a home she talks about [place]. She does not know today's date and after much prompting is " "able to say it is October. She does not know the year and is not aware of who the President of the [place] is. When asked where she is located currently, she says, "[place]."    She says that she has "bipolar, heart stroke and thyroid problems" but is unable to elaborate on what she is taking for these issues.    She has difficulty answering questions and the sitter in the room assisted with some of the interview.    She denied current SI. She denied HI. She denies AVH. She denied trouble sleeping. She denied issue with her mood, energy/motivation.    Psychiatric History:   Previous Psychiatric Hospitalizations: Yes  Previous Medication Trials: Yes, unable to recall  Previous Suicide Attempts: Unable to assess - patient has trouble attending to conversation  History of Violence: Unable to assess  History of Depression: Unable to assess  History of Thalia: Unable to assess  History of Auditory/Visual Hallucination: Unable to assess  History of Delusions: Unable to assess  Outpatient psychiatrist (current & past): Unable to assess    Substance Abuse History:  Tobacco: Unable to assess  Alcohol: Unable to assess  Illicit Substances: Unable to assess  Detox/Rehab: Unable to assess    Legal History: Past charges/incarcerations: Unable to assess     Family Psychiatric History: Unable to assess    Social History:  Unable to adequately assess social history given patient's mental status, see below. She was previously homeless prior to this hospitalization and was at [facility]. Speaks of several grandchildren and a boyfriend. It is unclear if she has contact with her adult children and her legal marital status.    Psychiatric Mental Status Exam:  Arousal: alert  Sensorium/Orientation: oriented to self, place, month but not to date and somewhat to situation after much prompting  Behavior/Cooperation: cooperative, amiable   Speech: spontaneous, fluent, slowed rate  Language: grossly intact  Mood: " fine " "   Affect: incongruent for situation, bright  Thought Process: flight of ideas, tangential, perseverative  Thought Content:   Auditory hallucinations: NO  Visual hallucinations: NO  Paranoia: NO  Delusions:  None elicited  Suicidal ideation: NO  Homicidal ideation: NO  Attention/Concentration: impaired - she answers some questions appropriately, others inappropriately  Memory:    Recent:  impaired   Remote: impaired  Insight: limited awareness of illness  Judgment: behavior is adequate to circumstances, limited      Past Medical History:   Past Medical History:   Diagnosis Date    Anticoagulant long-term use     Bipolar disorder, unspecified     Bipolar disorder, unspecified     CHF (congestive heart failure)     CKD (chronic kidney disease)     Diabetes mellitus     Hypertension     Hypothyroidism, unspecified     Paroxysmal atrial fibrillation       Laboratory Data:   Labs Reviewed   CBC W/ AUTO DIFFERENTIAL - Abnormal; Notable for the following components:       Result Value    RBC 3.08 (*)     Hemoglobin 7.2 (*)     Hematocrit 25.4 (*)     MCH 23.4 (*)     MCHC 28.3 (*)     RDW 17.2 (*)     Immature Granulocytes 0.9 (*)     Immature Grans (Abs) 0.05 (*)     Lymph # 0.8 (*)     Lymph % 14.2 (*)     All other components within normal limits   COMPREHENSIVE METABOLIC PANEL - Abnormal; Notable for the following components:    Potassium 2.7 (*)     Chloride 118 (*)     CO2 18 (*)     Calcium 5.4 (*)     Total Protein 4.2 (*)     Albumin 1.8 (*)     Alkaline Phosphatase 40 (*)     Anion Gap 6 (*)     All other components within normal limits    Narrative:       potassium, calcium critical result(s) called and verbal readback   obtained from tana kwok rn by [facility] 10/19/2023 15:32   B-TYPE NATRIURETIC PEPTIDE - Abnormal; Notable for the following components:     (*)     All other components within normal limits   PROTIME-INR - Abnormal; Notable for the following components:    Prothrombin Time 18.9 (*)     INR  1.8 (*)     All other components within normal limits   TROPONIN I   CK   IRON AND TIBC   CK       Neurological History:  Seizures: unable to assess  Head trauma: unable to assess    Allergies:   Review of patient's allergies indicates:   Allergen Reactions    Pcn [penicillins] Itching       Inpatient meds:   amiodarone  200 mg Oral Daily    atorvastatin  20 mg Oral Daily    calcium carbonate  500 mg Oral BID    divalproex  250 mg Oral Q12H    [START ON 10/21/2023] furosemide (LASIX) injection  40 mg Intravenous Daily    iron sucrose  200 mg Intravenous Daily    levothyroxine  50 mcg Oral Before breakfast    melatonin  3 mg Oral Nightly    metoprolol tartrate  25 mg Oral Once    metoprolol tartrate  50 mg Oral Q6H    polyethylene glycol  17 g Oral Daily    potassium bicarbonate  25 mEq Oral TID    rivaroxaban  15 mg Oral Daily with dinner    senna-docusate 8.6-50 mg  1 tablet Oral BID    sertraline  100 mg Oral Daily    tobramycin-dexAMETHasone 0.3-0.1%  1 drop Left Eye Q4H While awake    traZODone  100 mg Oral QHS      PRN meds:   acetaminophen    acetaminophen    dextrose 10%    dextrose 10%    diltiaZEM    glucagon (human recombinant)    glucose    glucose    melatonin    naloxone    ondansetron    simethicone    sodium chloride 0.9%     Medications at home:   Current Outpatient Medications   Medication Instructions    amiodarone (PACERONE) 200 mg, Oral, Daily    amiodarone (PACERONE) 200 mg, Oral, Daily    atorvastatin (LIPITOR) 20 mg, Oral, Daily    benazepriL (LOTENSIN) 10 mg, Oral, Daily    diltiaZEM (CARDIZEM CD) 180 mg, Oral, 2 times daily    diltiaZEM (CARDIZEM CD) 180 mg, Oral, 2 times daily    divalproex (DEPAKOTE) 250 mg, Oral, Every 12 hours    EScitalopram oxalate (LEXAPRO) 10 mg, Oral, Daily    furosemide (LASIX) 20 mg, Oral, Every morning    isosorbide mononitrate (IMDUR) 30 mg, Oral, Daily    levothyroxine (SYNTHROID) 25 mcg, Oral, Daily    metoprolol succinate (TOPROL-XL) 50 mg, Oral, Daily     metoprolol succinate (TOPROL-XL) 50 mg, Oral    rivaroxaban (XARELTO) 20 mg, Oral, With dinner    sertraline (ZOLOFT) 100 mg, Oral, Daily    traZODone (DESYREL) 100 mg, Oral, Nightly        No new subjective & objective note has been filed under this hospital service since the last note was generated.      Assessment - Diagnosis - Goals:     Diagnosis/Impression:   -Bipolar Disorder, unclear baseline but suspect type I given patient's tangential thought process and flight of ideas during interview today  -R/o Psychotic Disorder, no currently exhibiting or endorsing any hallucinations  -R/o Mood Disorder, not currently an issue  -Major neurocognitive disorder, due to multiple etiologies, suspected  -Homelessness    Recommendations:  LEGAL: Continue CEC as patient is gravely disabled at baseline.  PSYCHOTROPIC MEDS:  Ok to continue Depakote 250mg PO BID - if patient becomes more labile, ok to increase nighttime Depakote to 500mg; would recommend obtaining VPA level after three days of consecutive Depakote dosing  Ok to continue Trazodone 100mg PO QHS for sleep with the addition of melatonin given patient's hx of bipolar disorder  Recommend decreasing sertraline to 50mg PO QAM - unsure if patient has a mood disorder at baseline and if she has bipolar I disorder, an SSRI would be more likely to flip patient into lenny/hypomani  If needed for acute agitation, aggression, psychosis, ok to give olanzapine 2.5mg PO/IM q8hrs PRN  Agree with holding off on scheduling antipsychotics as patient is not exhibiting any psychotic behavior currently  DISPO: Patient lacks medical decision making capacity at the time of this evaluation. Unclear what her baseline is. Given her medical conditions and her lack of insight into how to appropriately care for herself, she will need senior care care and would benefit from an evaluation for SNF placement. Unsure of patient's family involvement and her legal marital status and number of children  she has. Would recommend having case management assist in locating next of kin if this becomes medically necessary.    Time with patient: 20mins with patient; 58mins total spent on this case including medical chart review, discussing case with care team, obtaining collateral where appropriate, documentation      More than 50% of the time was spent counseling/coordinating care    Consulting clinician was informed of the encounter and consult note.    Consultation ended: 10/20/2023 at 12:16PM    Rowan Nunez MD   Psychiatry  [facility]

## 2023-10-20 NOTE — HOSPITAL COURSE
Patient admitted cardiology and Psychiatry were consulted.  Patient was begun on amiodarone drip for control for AFib with RVR.  Meds were adjusted per Psychiatry.    Patient being seen by Cardiology.  She was started on amnio drip to attempted controlled rate.  Overnight her heart rate remained high and her blood pressure this morning was low cardiology will be evaluating her soon.    Seen by cardiology and low blood pressure was started on digoxin load for control of heart rate.  Reviewing her records seems that patient has had CKD for several years and likely will need continued diuresis.    With loading dose of amiodarone, and digoxin patient's heart rate controlled.  Seen again by psychiatry who felt that adjust medications as appropriate but that she was no longer at risk in PEC/CPC was canceled.  Patient is weak and debilitated and therefore seen by PT and OT with recommendation for skilled nursing placement prior to returning home.  Patient would like to go to skilled nursing facility closer to her home near [place] therefore order was placed for social work to attempt to place her in a facility there.    Actively participate with physical therapy and occupational therapy with improved strength.  Patient reports she is working well with therapy and would like to return closer to home.  Family conveyed interest in getting patient to be transitioned to [facility] care after she finishes SNF.  Accepted at [facility].    Seen and examined personally on day of discharge.  Patient is sitting up in chair, feels well and denies any complaints other than wanting to home.  Stable for discharge at this time.  Insurance authorization has been received and arrangements made to transfer patient over to SNF facility

## 2023-10-20 NOTE — PROGRESS NOTES
[facility] Medicine  Progress Note    Patient Name: Alejandra Strong  MRN: [id number]  Patient Class: IP- Inpatient   Admission Date: 10/19/2023  Length of Stay: 0 days  Attending Physician: Debora White MD  Primary Care Provider: Mer, Primary Doctor        Subjective:     Principal Problem:Atrial fibrillation with rapid ventricular response        HPI:  Patient is a 63-year-old female who has  has a past medical history of Anticoagulant long-term use, Bipolar disorder unspecified, CHF (congestive heart failure) reduced ejection fraction, CKD 3 (chronic kidney disease), Diabetes mellitus type 2, Hypertension, Hypothyroidism, unspecified, and Paroxysmal atrial fibrillation.     Patient initially presented to [facility] with complaints of visual and auditory hallucinations.  She was noted to be in AFib/flutter with RVR.  She was admitted to the hospital under pec her rate was controlled with amiodarone, Toprol-XL 50 mg a day and she was discharged to [facility] for further treatment of her bipolar disorder.  She she was discharged 10/11/[facility] to [facility].  She is been seen by PCP and Psychiatry there until today when she was noted to have significant swelling in her legs and feet and sent under a pec/CEC for evaluation.  In the emergency department she was noted to be in AFib/flutter rate of 114.  She was also noted to have potassium of 2.7, calcium of 5.4 corrected to 7.2 and a BNP at 9:12 a.m. which is down from 1993 on October 10th.  She was given IV Lasix calcium and potassium in the emergency department.  Her initial troponin was negative.  Patient is seen lethargic but arousable.  Knows she is in the hospital, the date, as well as her birth date.  She denies any chest pain or shortness a breath.    She is admitted under a pec/CEC will need a sitter and will consult psych for help with her meds in a.m.      Overview/Hospital Course:  Patient  admitted cardiology and Psychiatry were consulted.  Patient was begun on amiodarone drip for control for AFib with RVR.  Meds were adjusted per Psychiatry.      Interval History:  Patient reports feeling well.  She has some shortness of breath.  Some weakness.  She states she is ready to go home.  Heart rate is remaining elevated.    Review of Systems   Constitutional:  Negative for fatigue and fever.   Respiratory:  Positive for shortness of breath. Negative for cough.    Cardiovascular:  Positive for leg swelling. Negative for chest pain.   Gastrointestinal:  Negative for nausea and vomiting.   Neurological:  Positive for weakness.   Psychiatric/Behavioral:  Positive for behavioral problems and confusion. Agitation: Patient admitted under pec/CEC continued per Psychiatry.   All other systems reviewed and are negative.    Objective:     Vital Signs (Most Recent):  Temp: 98.6 °F (37 °C) (10/20/23 1320)  Pulse: (!) 118 (10/20/23 1320)  Resp: 16 (10/20/23 1320)  BP: 111/80 (10/20/23 1320)  SpO2: 95 % (10/20/23 1320) Vital Signs (24h Range):  Temp:  [97.4 °F (36.3 °C)-98.6 °F (37 °C)] 98.6 °F (37 °C)  Pulse:  [109-140] 118  Resp:  [12-20] 16  SpO2:  [94 %-98 %] 95 %  BP: (111-150)/(74-88) 111/80     Weight: 73.7 kg (162 lb 7.7 oz)  Body mass index is 28.78 kg/m².    Intake/Output Summary (Last 24 hours) at 10/20/2023 1649  Last data filed at 10/20/2023 1621  Gross per 24 hour   Intake --   Output 2550 ml   Net -2550 ml         Physical Exam  Vitals reviewed.   Constitutional:       Appearance: She is obese. She is ill-appearing (chronically).   HENT:      Head: Normocephalic and atraumatic.      Mouth/Throat:      Mouth: Mucous membranes are moist.      Pharynx: Oropharynx is clear.   Eyes:      General:         Left eye: Discharge present.     Extraocular Movements: Extraocular movements intact.   Cardiovascular:      Rate and Rhythm: Tachycardia present. Rhythm irregular.      Pulses: Normal pulses.      Heart  "sounds: Normal heart sounds.   Pulmonary:      Effort: Pulmonary effort is normal.      Breath sounds: Normal breath sounds.   Abdominal:      General: Bowel sounds are normal.      Palpations: Abdomen is soft.   Musculoskeletal:         General: Normal range of motion.      Cervical back: Normal range of motion and neck supple.      Right lower leg: Edema present.      Left lower leg: Edema present.   Skin:     General: Skin is warm and dry.   Neurological:      Mental Status: She is alert and oriented to person, place, and time. Mental status is at baseline.             Significant Labs: All pertinent labs within the past 24 hours have been reviewed.  Blood Culture: No results for input(s): "LABBLOO" in the last 48 hours.  CBC:   Recent Labs   Lab 10/19/23  1450 10/20/23  0529   WBC 5.29 4.22   HGB 7.2* 7.8*   HCT 25.4* 27.8*    144*     CMP:   Recent Labs   Lab 10/19/23  1450 10/20/23  0529    140   K 2.7* 4.4   * 104   CO2 18* 27   GLU 88 118*   BUN 20 28*   CREATININE 0.8 1.4   CALCIUM 5.4* 8.6*   PROT 4.2* 6.3   ALBUMIN 1.8* 2.6*   BILITOT 0.3 0.5   ALKPHOS 40* 50*   AST 21 30   ALT 13 18   ANIONGAP 6* 9     Cardiac Markers:   Recent Labs   Lab 10/19/23  1450   *     Coagulation:   Recent Labs   Lab 10/19/23  1450   INR 1.8*     Magnesium:   Recent Labs   Lab 10/20/23  0529   MG 1.7     Troponin:   Recent Labs   Lab 10/19/23  1450 10/19/23  2018   TROPONINI 0.012 0.014     TSH:   Recent Labs   Lab 10/20/23  0529   TSH 17.812*       Significant Imaging: I have reviewed all pertinent imaging results/findings within the past 24 hours.      Assessment/Plan:      * Atrial fibrillation with rapid ventricular response  Patient with Long standing persistent (>12 months) atrial fibrillation which is uncontrolled currently with Beta Blocker and Amiodarone. Patient is currently in atrial fibrillation.[id number] Score: 1. HASBLED Score: . Anticoagulation indicated. Anticoagulation done with " Xarelto     Continue outpatient amiodarone and metoprolol  Appreciate cardiology consult.  Patient was placed on amiodarone drip    Acute on chronic combined systolic and diastolic congestive heart failure  Patient is identified as having Combined Systolic and Diastolic heart failure that is Acute on chronic. CHF is currently uncontrolled due to Continued edema of extremities. Latest ECHO performed and demonstrates- Results for orders placed during the hospital encounter of 10/08/23    Echo    Interpretation Summary    Rhythm is atrial flutter.    Left Ventricle: The left ventricle is normal in size. Normal wall thickness. There is moderately reduced systolic function with a visually estimated ejection fraction of 35 - 40%.    Left Atrium: Left atrium is moderately dilated.    Right Ventricle: Mild right ventricular enlargement. Systolic function is mildly reduced.    Right Atrium: Right atrium is moderately dilated.    Aortic Valve: The aortic valve is a trileaflet valve. There is mild aortic valve sclerosis.    Mitral Valve: There is mild to moderate regurgitation.    Tricuspid Valve: There is moderate regurgitation.    Pulmonic Valve: There is mild regurgitation.    Pulmonary Artery: The estimated pulmonary artery systolic pressure is 51 mmHg.    IVC/SVC: Elevated venous pressure at 15 mmHg.    Pericardium: There is a trivial effusion.  . Continue Beta Blocker and Furosemide and monitor clinical status closely. Monitor on telemetry. Patient is off CHF pathway.  Monitor strict Is&Os and daily weights.  Place on fluid restriction of 1.5 L. Cardiology has not been any consulted. Continue to stress to patient importance of self efficacy and  on diet for CHF. Last BNP reviewed- and noted below   Recent Labs   Lab 10/19/23  1450   *   .    Hypothyroidism  Patient currently on replacement dose we will continue and recheck in a.m.      Essential hypertension  Lopressor 50 mg p.o. q.6 hours  We  will monitor add hydralazine if needed IV      Dyslipidemia  Continue patient's outpatient statin      Bipolar disorder  Patient admitted from [facility]  She has a diagnosis of [facility] and there prior to admission had auditory and visual hallucinations currently she was hyperactive with delusions and unable to understand her situation or take her medications.  She was started on Depakote  mg b.i.d. Zoloft 100 mg daily trazodone 100 mg q.h.s. was just recently started on Risperdal 1 mg p.o. b.i.d. for visual hallucinations.  At the time they were considering nursing home versus group home placement    Appreciate telepsych consult meds were adjusted per their recommendations      VTE Risk Mitigation (From admission, onward)         Ordered     rivaroxaban tablet 15 mg  With dinner         10/20/23 0938     Reason for No Pharmacological VTE Prophylaxis  Once        Question:  Reasons:  Answer:  Already adequately anticoagulated on oral Anticoagulants    10/19/23 1750     IP VTE HIGH RISK PATIENT  Once         10/19/23 1750     Place sequential compression device  Until discontinued         10/19/23 1750                Discharge Planning   EBEN:      Code Status: Full Code   Is the patient medically ready for discharge?:     Reason for patient still in hospital (select all that apply): Patient trending condition, Treatment, Consult recommendations and Pending disposition  Discharge Plan A: Psychiatric hospital                  Debora Trinh MD  Department of Hospital Medicine   [place] - Telemetry ([facility])

## 2023-10-20 NOTE — PT/OT/SLP PROGRESS
Physical Therapy      Patient Name:  Alejandra Strong   MRN:  [id number]    RE-ATTEMPTED P.T. EVAL THIS AM, PT STILL WITH UNSTEADY HR PER NURSE FALLON, REQUEST TO HOLD FOR TODAY, WILL CONTINUE EFFORTS    Desiree Ferrara, PT  10/20/2023  [id number]

## 2023-10-20 NOTE — HPI
Patient is a 63-year-old female who has  has a past medical history of Anticoagulant long-term use, Bipolar disorder unspecified, CHF (congestive heart failure) reduced ejection fraction, CKD 3 (chronic kidney disease), Diabetes mellitus type 2, Hypertension, Hypothyroidism, unspecified, and Paroxysmal atrial fibrillation.      Patient initially presented to [facility] with complaints of visual and auditory hallucinations.  She was noted to be in AFib/flutter with RVR.  She was admitted to the hospital under pec her rate was controlled with amiodarone, Toprol-XL 50 mg a day and she was discharged to [facility] for further treatment of her bipolar disorder.  She she was discharged 10/11/2023 [facility].  She is been seen by PCP and Psychiatry there until today when she was noted to have significant swelling in her legs and feet and sent under a pec/CEC for evaluation.  In the emergency department she was noted to be in AFib/flutter rate of 114.  She was also noted to have potassium of 2.7, calcium of 5.4 corrected to 7.2 and a BNP at 9:12 a.m. which is down from 1993 on October 10th.  She was given IV Lasix calcium and potassium in the emergency department.  Her initial troponin was negative.  Patient is seen lethargic but arousable.  Knows she is in the hospital, the date, as well as her birth date.  She denies any chest pain or shortness a breath.     Cardiology consulted to assist with management. Pt seen and examined today, resting in bed, sitter in room. Pt denies and SOB, reports occasional palpitations. H/o bipolar/psych issues pt not the best historian chart reviewed. Tele reviewed, Afib RVR. Labs reviewed, H/H 7.8 and 27.8 Plt 144, K 2.7, Echo 10/9/23 35-40%.

## 2023-10-20 NOTE — PLAN OF CARE
10/20/23 1245   Discharge Planning   Assessment Type Discharge Planning Brief Assessment   Resource/Environmental Concerns none   Support Systems Spouse/significant other;Friends/neighbors   Equipment Currently Used at Home none   Current Living Arrangements   (Unknown)   Patient/Family Anticipates Transition to   (inpatient psych placement)   Patient/Family Anticipated Services at Transition    DME Needed Upon Discharge  none   Discharge Plan A Psychiatric hospital       Patient was admitted under [facility].  Per chart review, Patient is legally  with 2 children, whom she is estranged from. Patient has Significant other, Maxx Wright (no phone) and Janelle Ms. Rueda ([phone]) is listed as emergency contact. Patient will return to [facility] once medically stable.

## 2023-10-20 NOTE — PT/OT/SLP PROGRESS
Occupational Therapy      Patient Name:  Alejandra Strong   MRN:  [id number]    OT eval orders received. Chart review completed. Attempted at 0915 & 1115 to see patient. Noted to have significant tachycardia in setting of afib with RVR while at rest in supine. Spoke with nurse Rascon. Recommended hold until HR better controlled. Will continue efforts.    Radha Britt, JORGE  10/20/2023  0915 & 1115

## 2023-10-21 PROBLEM — N18.32 STAGE 3B CHRONIC KIDNEY DISEASE: Status: ACTIVE | Noted: 2023-10-21

## 2023-10-21 LAB
ALBUMIN SERPL BCP-MCNC: 2.8 G/DL (ref 3.5–5.2)
ALP SERPL-CCNC: 63 U/L (ref 55–135)
ALT SERPL W/O P-5'-P-CCNC: 23 U/L (ref 10–44)
ANION GAP SERPL CALC-SCNC: 12 MMOL/L (ref 8–16)
AST SERPL-CCNC: 38 U/L (ref 10–40)
BASOPHILS # BLD AUTO: 0.06 K/UL (ref 0–0.2)
BASOPHILS NFR BLD: 1 % (ref 0–1.9)
BILIRUB SERPL-MCNC: 0.4 MG/DL (ref 0.1–1)
BNP SERPL-MCNC: 1163 PG/ML (ref 0–99)
BUN SERPL-MCNC: 27 MG/DL (ref 8–23)
CALCIUM SERPL-MCNC: 8.2 MG/DL (ref 8.7–10.5)
CHLORIDE SERPL-SCNC: 98 MMOL/L (ref 95–110)
CO2 SERPL-SCNC: 28 MMOL/L (ref 23–29)
CREAT SERPL-MCNC: 1.6 MG/DL (ref 0.5–1.4)
DIFFERENTIAL METHOD: ABNORMAL
EOSINOPHIL # BLD AUTO: 0.1 K/UL (ref 0–0.5)
EOSINOPHIL NFR BLD: 1.4 % (ref 0–8)
ERYTHROCYTE [DISTWIDTH] IN BLOOD BY AUTOMATED COUNT: 17.8 % (ref 11.5–14.5)
EST. GFR  (NO RACE VARIABLE): 36 ML/MIN/1.73 M^2
GLUCOSE SERPL-MCNC: 148 MG/DL (ref 70–110)
HCT VFR BLD AUTO: 27.2 % (ref 37–48.5)
HGB BLD-MCNC: 7.6 G/DL (ref 12–16)
IMM GRANULOCYTES # BLD AUTO: 0.03 K/UL (ref 0–0.04)
IMM GRANULOCYTES NFR BLD AUTO: 0.5 % (ref 0–0.5)
LACTATE SERPL-SCNC: 1.1 MMOL/L (ref 0.5–2.2)
LYMPHOCYTES # BLD AUTO: 1 K/UL (ref 1–4.8)
LYMPHOCYTES NFR BLD: 17.1 % (ref 18–48)
MAGNESIUM SERPL-MCNC: 2.6 MG/DL (ref 1.6–2.6)
MCH RBC QN AUTO: 22.8 PG (ref 27–31)
MCHC RBC AUTO-ENTMCNC: 27.9 G/DL (ref 32–36)
MCV RBC AUTO: 82 FL (ref 82–98)
MONOCYTES # BLD AUTO: 0.8 K/UL (ref 0.3–1)
MONOCYTES NFR BLD: 13.9 % (ref 4–15)
NEUTROPHILS # BLD AUTO: 3.9 K/UL (ref 1.8–7.7)
NEUTROPHILS NFR BLD: 66.1 % (ref 38–73)
NRBC BLD-RTO: 0 /100 WBC
PHOSPHATE SERPL-MCNC: 3.7 MG/DL (ref 2.7–4.5)
PLATELET # BLD AUTO: 154 K/UL (ref 150–450)
PMV BLD AUTO: 12.7 FL (ref 9.2–12.9)
POTASSIUM SERPL-SCNC: 4.6 MMOL/L (ref 3.5–5.1)
PROT SERPL-MCNC: 6.9 G/DL (ref 6–8.4)
RBC # BLD AUTO: 3.33 M/UL (ref 4–5.4)
SODIUM SERPL-SCNC: 138 MMOL/L (ref 136–145)
VALPROATE SERPL-MCNC: 34.9 UG/ML (ref 50–100)
WBC # BLD AUTO: 5.89 K/UL (ref 3.9–12.7)

## 2023-10-21 PROCEDURE — 25000242 PHARM REV CODE 250 ALT 637 W/ HCPCS: Performed by: INTERNAL MEDICINE

## 2023-10-21 PROCEDURE — 93005 ELECTROCARDIOGRAM TRACING: CPT

## 2023-10-21 PROCEDURE — 94799 UNLISTED PULMONARY SVC/PX: CPT

## 2023-10-21 PROCEDURE — 97530 THERAPEUTIC ACTIVITIES: CPT

## 2023-10-21 PROCEDURE — 85025 COMPLETE CBC W/AUTO DIFF WBC: CPT | Performed by: INTERNAL MEDICINE

## 2023-10-21 PROCEDURE — 83735 ASSAY OF MAGNESIUM: CPT | Performed by: INTERNAL MEDICINE

## 2023-10-21 PROCEDURE — 84100 ASSAY OF PHOSPHORUS: CPT | Performed by: INTERNAL MEDICINE

## 2023-10-21 PROCEDURE — 63600175 PHARM REV CODE 636 W HCPCS: Performed by: NURSE PRACTITIONER

## 2023-10-21 PROCEDURE — 63600175 PHARM REV CODE 636 W HCPCS

## 2023-10-21 PROCEDURE — 93010 EKG 12-LEAD: ICD-10-PCS | Mod: ,,, | Performed by: STUDENT IN AN ORGANIZED HEALTH CARE EDUCATION/TRAINING PROGRAM

## 2023-10-21 PROCEDURE — 25000003 PHARM REV CODE 250: Performed by: STUDENT IN AN ORGANIZED HEALTH CARE EDUCATION/TRAINING PROGRAM

## 2023-10-21 PROCEDURE — 80164 ASSAY DIPROPYLACETIC ACD TOT: CPT | Performed by: INTERNAL MEDICINE

## 2023-10-21 PROCEDURE — 36415 COLL VENOUS BLD VENIPUNCTURE: CPT | Performed by: INTERNAL MEDICINE

## 2023-10-21 PROCEDURE — 97166 OT EVAL MOD COMPLEX 45 MIN: CPT

## 2023-10-21 PROCEDURE — 93010 ELECTROCARDIOGRAM REPORT: CPT | Mod: ,,, | Performed by: STUDENT IN AN ORGANIZED HEALTH CARE EDUCATION/TRAINING PROGRAM

## 2023-10-21 PROCEDURE — 97162 PT EVAL MOD COMPLEX 30 MIN: CPT

## 2023-10-21 PROCEDURE — 80053 COMPREHEN METABOLIC PANEL: CPT | Performed by: INTERNAL MEDICINE

## 2023-10-21 PROCEDURE — 25000003 PHARM REV CODE 250: Performed by: INTERNAL MEDICINE

## 2023-10-21 PROCEDURE — 83880 ASSAY OF NATRIURETIC PEPTIDE: CPT | Performed by: STUDENT IN AN ORGANIZED HEALTH CARE EDUCATION/TRAINING PROGRAM

## 2023-10-21 PROCEDURE — 83605 ASSAY OF LACTIC ACID: CPT | Performed by: STUDENT IN AN ORGANIZED HEALTH CARE EDUCATION/TRAINING PROGRAM

## 2023-10-21 PROCEDURE — 36415 COLL VENOUS BLD VENIPUNCTURE: CPT | Performed by: STUDENT IN AN ORGANIZED HEALTH CARE EDUCATION/TRAINING PROGRAM

## 2023-10-21 PROCEDURE — 21400001 HC TELEMETRY ROOM

## 2023-10-21 PROCEDURE — 99900035 HC TECH TIME PER 15 MIN (STAT)

## 2023-10-21 PROCEDURE — 63600175 PHARM REV CODE 636 W HCPCS: Performed by: INTERNAL MEDICINE

## 2023-10-21 RX ORDER — DIGOXIN 0.25 MG/ML
250 INJECTION INTRAMUSCULAR; INTRAVENOUS ONCE
Status: COMPLETED | OUTPATIENT
Start: 2023-10-21 | End: 2023-10-21

## 2023-10-21 RX ORDER — NITROGLYCERIN 0.4 MG/1
0.4 TABLET SUBLINGUAL EVERY 5 MIN PRN
Status: DISCONTINUED | OUTPATIENT
Start: 2023-10-21 | End: 2023-10-31 | Stop reason: HOSPADM

## 2023-10-21 RX ORDER — METOPROLOL TARTRATE 25 MG/1
12.5 TABLET ORAL EVERY 6 HOURS
Status: DISCONTINUED | OUTPATIENT
Start: 2023-10-21 | End: 2023-10-21

## 2023-10-21 RX ORDER — METOPROLOL TARTRATE 25 MG/1
12.5 TABLET ORAL 2 TIMES DAILY
Status: DISCONTINUED | OUTPATIENT
Start: 2023-10-21 | End: 2023-10-22

## 2023-10-21 RX ORDER — DIGOXIN 0.25 MG/ML
250 INJECTION INTRAMUSCULAR; INTRAVENOUS EVERY 6 HOURS
Status: COMPLETED | OUTPATIENT
Start: 2023-10-21 | End: 2023-10-22

## 2023-10-21 RX ORDER — MAGNESIUM SULFATE HEPTAHYDRATE 40 MG/ML
2 INJECTION, SOLUTION INTRAVENOUS ONCE
Status: COMPLETED | OUTPATIENT
Start: 2023-10-21 | End: 2023-10-21

## 2023-10-21 RX ADMIN — TOBRAMYCIN AND DEXAMETHASONE 1 DROP: 3; 1 SUSPENSION/ DROPS OPHTHALMIC at 08:10

## 2023-10-21 RX ADMIN — LEVOTHYROXINE SODIUM 50 MCG: 50 TABLET ORAL at 06:10

## 2023-10-21 RX ADMIN — DIVALPROEX SODIUM 250 MG: 250 TABLET, DELAYED RELEASE ORAL at 08:10

## 2023-10-21 RX ADMIN — TRAZODONE HYDROCHLORIDE 100 MG: 100 TABLET ORAL at 08:10

## 2023-10-21 RX ADMIN — ACETAMINOPHEN 650 MG: 325 TABLET ORAL at 02:10

## 2023-10-21 RX ADMIN — SENNOSIDES AND DOCUSATE SODIUM 1 TABLET: 8.6; 5 TABLET ORAL at 08:10

## 2023-10-21 RX ADMIN — METOPROLOL TARTRATE 50 MG: 50 TABLET, FILM COATED ORAL at 06:10

## 2023-10-21 RX ADMIN — IRON SUCROSE 200 MG: 20 INJECTION, SOLUTION INTRAVENOUS at 08:10

## 2023-10-21 RX ADMIN — SERTRALINE HYDROCHLORIDE 50 MG: 50 TABLET ORAL at 08:10

## 2023-10-21 RX ADMIN — CALCIUM CARBONATE (ANTACID) CHEW TAB 500 MG 500 MG: 500 CHEW TAB at 08:10

## 2023-10-21 RX ADMIN — MAGNESIUM SULFATE HEPTAHYDRATE 2 G: 40 INJECTION, SOLUTION INTRAVENOUS at 12:10

## 2023-10-21 RX ADMIN — NITROGLYCERIN 0.4 MG: 0.4 TABLET SUBLINGUAL at 05:10

## 2023-10-21 RX ADMIN — DIGOXIN 250 MCG: 250 INJECTION, SOLUTION INTRAMUSCULAR; INTRAVENOUS at 09:10

## 2023-10-21 RX ADMIN — TOBRAMYCIN AND DEXAMETHASONE 1 DROP: 3; 1 SUSPENSION/ DROPS OPHTHALMIC at 05:10

## 2023-10-21 RX ADMIN — DIGOXIN 250 MCG: 250 INJECTION, SOLUTION INTRAMUSCULAR; INTRAVENOUS at 05:10

## 2023-10-21 RX ADMIN — RIVAROXABAN 15 MG: 15 TABLET, FILM COATED ORAL at 05:10

## 2023-10-21 RX ADMIN — AMIODARONE HYDROCHLORIDE 0.5 MG/MIN: 1.8 INJECTION, SOLUTION INTRAVENOUS at 06:10

## 2023-10-21 RX ADMIN — METOPROLOL TARTRATE 12.5 MG: 25 TABLET, FILM COATED ORAL at 08:10

## 2023-10-21 RX ADMIN — MELATONIN TAB 3 MG 3 MG: 3 TAB at 08:10

## 2023-10-21 RX ADMIN — POTASSIUM BICARBONATE 25 MEQ: 978 TABLET, EFFERVESCENT ORAL at 08:10

## 2023-10-21 RX ADMIN — METOPROLOL TARTRATE 12.5 MG: 25 TABLET, FILM COATED ORAL at 01:10

## 2023-10-21 RX ADMIN — TOBRAMYCIN AND DEXAMETHASONE 1 DROP: 3; 1 SUSPENSION/ DROPS OPHTHALMIC at 01:10

## 2023-10-21 RX ADMIN — ATORVASTATIN CALCIUM 20 MG: 10 TABLET, FILM COATED ORAL at 08:10

## 2023-10-21 RX ADMIN — TOBRAMYCIN AND DEXAMETHASONE 1 DROP: 3; 1 SUSPENSION/ DROPS OPHTHALMIC at 06:10

## 2023-10-21 NOTE — ASSESSMENT & PLAN NOTE
Patient with Long standing persistent (>12 months) atrial fibrillation which is uncontrolled currently with Beta Blocker and Amiodarone. Patient is currently in atrial fibrillation.[id number] Score: 1. HASBLED Score: . Anticoagulation indicated. Anticoagulation done with Xarelto     Continue outpatient amiodarone and metoprolol  Appreciate cardiology consult.  Patient was placed on amiodarone drip    Discussing with Cardiology who recommended loading dose of due to at 0.25 mg q.6 hours x4 doses  Due to lower blood pressure metoprolol changed 12.5 mg b.i.d.

## 2023-10-21 NOTE — PLAN OF CARE
Evaluation completed. Pt found supine in bed upon arrival with some confusion and only oriented to person and place. Pt performed rolling L to R with mod A. Pt assisted to sitting EOB with mod A. Pt sat 1 minute then reports significant dizziness and returned to supine. RN present to assess vitals after return to supine.

## 2023-10-21 NOTE — PLAN OF CARE
OT EVAL COMPLETE.  PATIENT WOULD BENEFIT FROM CONT SKILLED OT INTERVENTION.  OT RECOMMENDS SNF AT D/C DUE TO PATIENT WANTING TO BE ABLE TO RETURN TO THE [facility] IN [place].

## 2023-10-21 NOTE — PT/OT/SLP EVAL
Occupational Therapy   Evaluation    Name: Alejandra Strong  MRN: [id number]  Admitting Diagnosis: Atrial fibrillation with rapid ventricular response  Recent Surgery: * No surgery found *      Recommendations:     Discharge Recommendations: Moderate Intensity Therapy  Discharge Equipment Recommendations:  walker, rolling  Barriers to discharge:  Other (Comment) (PATIENT RESIDES AT [facility] IN [place].)    Assessment:     Alejandra Strong is a 63 y.o. female with a medical diagnosis of Atrial fibrillation with rapid ventricular response.  She presents with SELF CARE DEBILITY . Performance deficits affecting function: weakness, impaired endurance, impaired self care skills, impaired functional mobility, gait instability, impaired balance, impaired cognition, decreased upper extremity function, decreased lower extremity function, decreased safety awareness, pain.      Rehab Prognosis: Good; patient would benefit from acute skilled OT services to address these deficits and reach maximum level of function.       Plan:     Patient to be seen 2 x/week to address the above listed problems via self-care/home management, therapeutic activities, therapeutic exercises  Plan of Care Expires: 11/04/23  Plan of Care Reviewed with: patient    Subjective     Chief Complaint: ABDOMINAL PAIN.  Patient/Family Comments/goals: PATIENT STATED SHE LOOKS FORWARD TO RETURNING TO THE [facility] IN [place] TO BE ABLE TO SEE HER FRIEND AND BOYFRIEND.    Occupational Profile:  Living Environment: [facility] IN [place].  Previous level of function: PATIENT STATED SHE WAS (I) WITH ALL LEVELS OF SELF CARE.  AS ABLE TO WALK TO THE STORE FROM THE [facility].     Roles and Routines:  PATIENT STATED SHE WAS ABLE TO WALK TO THE STORE FROM THE [facility].     Equipment Used at Home: none  Assistance upon Discharge: PATIENT STATED SHE HAS A FRIEND AT THE [facility].    Pain/Comfort:  Pain Rating 1:  "5/10  Location 1: abdomen  Pain Addressed 1: Reposition, Distraction, Cessation of Activity, Nurse notified    Patients cultural, spiritual, Presybeterian conflicts given the current situation: no    Objective:     Communicated with: NURSE GANN prior to session.  Patient found supine with peripheral IV, telemetry upon OT entry to room.    General Precautions: Standard, fall  Orthopedic Precautions: N/A  Braces: N/A  Respiratory Status: Room air    Occupational Performance:    Bed Mobility:    Patient completed Rolling/Turning to Left with  moderate assistance  Patient completed Rolling/Turning to Right with moderate assistance  Patient completed Scooting/Bridging with moderate assistance  Patient completed Supine to Sit with moderate assistance  Patient completed Sit to Supine with moderate assistance    Functional Mobility/Transfers:  DIFFICULT TO ASSESS DUE TO C/O DIZZINESS.      Activities of Daily Living:  Grooming: maximal assistance TO COMB HAIR WITH PATIENT UNABLE TO COMPLETE TASK SEATED EOB DUE TO C/O DIZZINESS.  Lower Body Dressing: dependence WITH FOOTWEAR MANAGEMENT TO DON/DOFF SOCKS AS WELL AS TO CHANGE BRIEF.  Toileting: moderate assistance WITH PERINEAL HYGIENE SUPINE.    Cognitive/Visual Perceptual:  PATIENT UNABLE TO STATE MONTH BUT COULD STATE CURRENT YEAR.    Physical Exam:  Balance:    -       PATIENT UNABLE TO STAND DUE TO C/O DIZZINESS.  PATIENT WITH CGA/MIN (A) FOR BALANCE SEATED EOB.  Upper Extremity Range of Motion:   BUE AROM WFL ASSESSED FUNCTIONALLY TO COMB HAIR AND TO (A) WITH SCOOTING TO HOB PULLING ON BED RAILS.    [facility] 6 Click ADL:  AMPAC Total Score: 14    Treatment & Education:  OT EVAL PERFORMED.  PATIENT EDUCATED RE:  PURPOSE OF OT AND IMPORTANCE OF "CALL--DON'T FALL" TO DECREASE FALL RISK.  PATIENT PARTICIPATED IN [facility] MOBILITY AND SELF CARE TASKS.     Patient left HOB elevated with all lines intact, call button in reach, bed alarm on, and NURSE present    GOALS: "   Multidisciplinary Problems       Occupational Therapy Goals          Problem: Occupational Therapy    Goal Priority Disciplines Outcome Interventions   Occupational Therapy Goal     OT, PT/OT     Description: LTG'S TO BE MET IN 14 DAYS (11/4/23)    1)  PATIENT WILL PERFORM UB DRESSING WITH SBA DUE TO PATIENT LIVES ALONE IN [facility] WITH DECREASED CAREGIVER SUPPORT.    2)  PATIENT WILL PERFORM LB DRESSING WITH  SBA DUE TO PATIENT LIVES ALONE IN [facility] WITH DECREASED CAREGIVER SUPPORT.    3)  PATIENT WILL PERFORM TOILET T/F WITH  SBA DUE TO PATIENT LIVES ALONE IN [facility] WITH DECREASED CAREGIVER SUPPORT.    4)  PATIENT WILL PERFORM STANDING AT SINK X5-X10 MIN WITH (S) WITH RW IF NEEDED TO PERFORM SIMPLE GROOMING/HYGIENE WITH NO LOB.                         History:     Past Medical History:   Diagnosis Date    Anticoagulant long-term use     Bipolar disorder, unspecified     Bipolar disorder, unspecified     CHF (congestive heart failure)     CKD (chronic kidney disease)     Diabetes mellitus     Hypertension     Hypothyroidism, unspecified     Paroxysmal atrial fibrillation        History reviewed. No pertinent surgical history.    Time Tracking:     OT Date of Treatment: 10/21/23  OT Start Time: 0825  OT Stop Time: 0850  OT Total Time (min): 25 min    Billable Minutes:Evaluation 10  Therapeutic Activity 15    10/21/2023

## 2023-10-21 NOTE — ASSESSMENT & PLAN NOTE
Echo with EF 35-40%  Cont OMT metoprolol, IV diuresis, ACEi as BP tolerates  Strict I/Os  Low Na diet    10/21/23  Renal function mildly worsened  Lasix stopped  Has put out 2.6 L  Check lactate and BNP as she still has significant lower extremity swelling    10/22/23  Renal function improving  Will start torsemide PO, breathing well on room air

## 2023-10-21 NOTE — ASSESSMENT & PLAN NOTE
Tele reviewed, afib HR 140s  Cont xarelto  Start Amio gtt, can DC PO Amio    10/21/23  Continue on Amio infusion  Became hypotensive today, metoprolol reduced to 12.5 q6h  Add IV digoxin 250 mcg x3 doses q6h - will have to monitor d/t renal function being mildly worsened  Check digoxin level in the morning    10/22/23  HR levels now controlled  Continue OAC  Digoxin level in the AM  Most ;likely will start digoxen 125mcg daily, renal function improved, but most likely  has possible CKD  Will start PO aniodorone titration, 400mg BID x 7 days, 400 daily x 7 days then 200 daily  Stop amio infusion

## 2023-10-21 NOTE — ASSESSMENT & PLAN NOTE
Patient admitted from [facility]  She has a diagnosis of bipolar just is under [facility] is reportedly homeless and there prior to admission had auditory and visual hallucinations currently she was hyperactive with delusions and unable to understand her situation or take her medications.  She was started on Depakote  mg b.i.d. Zoloft 100 mg daily trazodone 100 mg q.h.s. was just recently started on Risperdal 1 mg p.o. b.i.d. for visual hallucinations.  At the time they were considering nursing home versus group home placement    Appreciate telepsych consult meds were adjusted per their recommendations

## 2023-10-21 NOTE — NURSING
Pt noted with 6 beat run of Vtach. Pt sleeping, no outward s/s of distress. Amio gtt continues to run. HR remains in the 120s/130s. On call providers notified with new orders received.

## 2023-10-21 NOTE — HOSPITAL COURSE
10/21/23  Patient seen and examined today. Leg swelling noted. Output noted as 2.6L, but is urinating in depends. Labs and chart reviewed. Creatinine 1.6 from 0.8. BUN 27 from 20. CO2 28 from 18.     10/22/23 Pt states she is feeling well. Denies CP or SOB. Creatine now stable at 1.4. BUN now stable at 20.

## 2023-10-21 NOTE — SUBJECTIVE & OBJECTIVE
Review of Systems   Constitutional: Negative for diaphoresis, malaise/fatigue, weight gain and weight loss.   HENT:  Negative for congestion and nosebleeds.    Cardiovascular:  Positive for leg swelling. Negative for chest pain, claudication, cyanosis, dyspnea on exertion, irregular heartbeat, near-syncope, orthopnea, palpitations, paroxysmal nocturnal dyspnea and syncope.   Respiratory:  Negative for cough, hemoptysis, shortness of breath, sleep disturbances due to breathing, snoring, sputum production and wheezing.    Hematologic/Lymphatic: Negative for bleeding problem. Does not bruise/bleed easily.   Skin:  Rash: chest.   Musculoskeletal:  Negative for arthritis, back pain, falls, joint pain, muscle cramps and muscle weakness.   Gastrointestinal:  Negative for abdominal pain, constipation, diarrhea, heartburn, hematemesis, hematochezia, melena, nausea and vomiting.   Genitourinary:  Negative for dysuria, hematuria and nocturia.   Neurological:  Negative for excessive daytime sleepiness, dizziness, headaches, light-headedness, loss of balance, numbness, vertigo and weakness.     Objective:     Vital Signs (Most Recent):  Temp: 98.5 °F (36.9 °C) (10/21/23 1547)  Pulse: (!) 118 (10/21/23 1547)  Resp: 18 (10/21/23 1547)  BP: 123/82 (10/21/23 1547)  SpO2: 96 % (10/21/23 1547) Vital Signs (24h Range):  Temp:  [98 °F (36.7 °C)-98.8 °F (37.1 °C)] 98.5 °F (36.9 °C)  Pulse:  [107-147] 118  Resp:  [16-20] 18  SpO2:  [92 %-96 %] 96 %  BP: (108-124)/(66-93) 123/82     Weight: 74.5 kg (164 lb 3.9 oz)  Body mass index is 29.09 kg/m².     SpO2: 96 %         Intake/Output Summary (Last 24 hours) at 10/21/2023 1652  Last data filed at 10/21/2023 0924  Gross per 24 hour   Intake 200 ml   Output 250 ml   Net -50 ml       Lines/Drains/Airways       Peripheral Intravenous Line  Duration                  Peripheral IV - Single Lumen 10/21/23 0130 20 G Distal;Left;Posterior Forearm <1 day         Peripheral IV - Single Lumen  10/21/23 0130 20 G Left;Posterior Forearm <1 day                       Physical Exam  Vitals and nursing note reviewed.   Constitutional:       Appearance: Normal appearance. She is well-developed.   HENT:      Head: Normocephalic.      Mouth/Throat:      Mouth: Mucous membranes are moist.   Neck:      Vascular: No carotid bruit or JVD.   Cardiovascular:      Rate and Rhythm: Tachycardia present. Rhythm irregular.      Pulses: Normal pulses.      Heart sounds: Normal heart sounds. No murmur heard.     No friction rub.   Pulmonary:      Effort: Pulmonary effort is normal. No respiratory distress.      Breath sounds: Normal breath sounds. No wheezing or rales.   Abdominal:      General: Bowel sounds are normal. There is no distension.      Palpations: Abdomen is soft.      Tenderness: There is no abdominal tenderness. There is no guarding.   Musculoskeletal:         General: No swelling or tenderness.      Cervical back: Neck supple. No tenderness.      Right lower leg: Edema present.      Left lower leg: Edema present.   Skin:     General: Skin is warm and dry.      Capillary Refill: Capillary refill takes less than 2 seconds.      Findings: Rash (chest) present.   Neurological:      General: No focal deficit present.      Mental Status: She is alert and oriented to person, place, and time.   Psychiatric:         Mood and Affect: Mood normal.         Behavior: Behavior normal.         Thought Content: Thought content normal.            Significant Labs: BMP:   Recent Labs   Lab 10/20/23  0529 10/21/23  0500   * 148*    138   K 4.4 4.6    98   CO2 27 28   BUN 28* 27*   CREATININE 1.4 1.6*   CALCIUM 8.6* 8.2*   MG 1.7 2.6   , CMP   Recent Labs   Lab 10/20/23  0529 10/21/23  0500    138   K 4.4 4.6    98   CO2 27 28   * 148*   BUN 28* 27*   CREATININE 1.4 1.6*   CALCIUM 8.6* 8.2*   PROT 6.3 6.9   ALBUMIN 2.6* 2.8*   BILITOT 0.5 0.4   ALKPHOS 50* 63   AST 30 38   ALT 18 23   ANIONGAP  "9 12   , CBC   Recent Labs   Lab 10/20/23  0529 10/21/23  0500   WBC 4.22 5.89   HGB 7.8* 7.6*   HCT 27.8* 27.2*   * 154   , INR No results for input(s): "INR", "PROTIME" in the last 48 hours., and Lipid Panel No results for input(s): "CHOL", "HDL", "LDLCALC", "TRIG", "CHOLHDL" in the last 48 hours.    Significant Imaging: Echocardiogram: 2D echo with color flow doppler: No results found for this or any previous visit. and Transthoracic echo (TTE) complete (Cupid Only):   Results for orders placed or performed during the hospital encounter of 10/08/23   Echo   Result Value Ref Range    BSA 1.73 m2    LVOT stroke volume 34.90 cm3    LVIDd 4.34 3.5 - 6.0 cm    LV Systolic Volume 53.86 mL    LV Systolic Volume Index 31.5 mL/m2    LVIDs 3.58 2.1 - 4.0 cm    LV Diastolic Volume 84.71 mL    LV Diastolic Volume Index 49.54 mL/m2    IVS 1.48 (A) 0.6 - 1.1 cm    LVOT diameter 2.13 cm    LVOT area 3.6 cm2    FS 18 (A) 28 - 44 %    Left Ventricle Relative Wall Thickness 0.52 cm    Posterior Wall 1.13 (A) 0.6 - 1.1 cm    LV mass 211.89 g    LV Mass Index 124 g/m2    MV Peak E Kaden 0.88 m/s    TDI LATERAL 0.09 m/s    TDI SEPTAL 0.06 m/s    E/E' ratio 11.73 m/s    MV Peak A Kaden 0.51 m/s    TR Max Kaden 2.99 m/s    E/A ratio 1.73     E wave deceleration time 58.22 msec    LV SEPTAL E/E' RATIO 14.67 m/s    LV LATERAL E/E' RATIO 9.78 m/s    LVOT peak kaden 0.65 m/s    Left Ventricular Outflow Tract Mean Velocity 0.45 cm/s    Left Ventricular Outflow Tract Mean Gradient 0.95 mmHg    LA size 4.75 cm    RVDD 3.11 cm    AV mean gradient 3 mmHg    AV peak gradient 5 mmHg    Ao peak kaden 1.15 m/s    Ao VTI 15.30 cm    LVOT peak VTI 9.80 cm    AV valve area 2.28 cm²    AV Velocity Ratio 0.57     AV index (prosthetic) 0.64     SIDNEY by Velocity Ratio 2.01 cm²    MV mean gradient 2 mmHg    MV peak gradient 5 mmHg    MV stenosis pressure 1/2 time 16.88 ms    MV valve area p 1/2 method 13.03 cm2    MV valve area by continuity eq 2.08 cm2    MV " VTI 16.8 cm    Triscuspid Valve Regurgitation Peak Gradient 36 mmHg    Mean e' 0.08 m/s    ZLVIDS 1.56     ZLVIDD -0.90     TV resting pulmonary artery pressure 51 mmHg    RV TB RVSP 18 mmHg    Est. RA pres 15 mmHg    Narrative      Rhythm is atrial flutter.    Left Ventricle: The left ventricle is normal in size. Normal wall   thickness. There is moderately reduced systolic function with a visually   estimated ejection fraction of 35 - 40%.    Left Atrium: Left atrium is moderately dilated.    Right Ventricle: Mild right ventricular enlargement. Systolic function   is mildly reduced.    Right Atrium: Right atrium is moderately dilated.    Aortic Valve: The aortic valve is a trileaflet valve. There is mild   aortic valve sclerosis.    Mitral Valve: There is mild to moderate regurgitation.    Tricuspid Valve: There is moderate regurgitation.    Pulmonic Valve: There is mild regurgitation.    Pulmonary Artery: The estimated pulmonary artery systolic pressure is   51 mmHg.    IVC/SVC: Elevated venous pressure at 15 mmHg.    Pericardium: There is a trivial effusion.       EKG: Atrial fibrillation with a competing junctional pacemaker   Septal infarct ,age undetermined   T wave abnormality, consider anterior ischemia

## 2023-10-21 NOTE — PT/OT/SLP EVAL
"Physical Therapy Evaluation    Patient Name:  Alejandra Strong   MRN:  [id number]    Recommendations:     Discharge Recommendations: Moderate Intensity Therapy   Discharge Equipment Recommendations: walker, rolling   Barriers to discharge:  pt with no family support and reports being homeless prior to admission    Assessment:     Alejandra Strong is a 63 y.o. female admitted with a medical diagnosis of Atrial fibrillation with rapid ventricular response.  She presents with the following impairments/functional limitations: weakness, gait instability, decreased ROM, impaired balance, impaired endurance, impaired cognition, decreased safety awareness, impaired functional mobility.    Rehab Prognosis: Fair; patient would benefit from acute skilled PT services to address these deficits and reach maximum level of function.    Recent Surgery: * No surgery found *      Plan:     During this hospitalization, patient to be seen 3 x/week to address the identified rehab impairments via gait training, therapeutic activities, therapeutic exercises, neuromuscular re-education and progress toward the following goals:    Plan of Care Expires:  11/04/23    Subjective     Chief Complaint: pt reports that she is tired, but is agreeable to session  Patient/Family Comments/goals: pt states that she wants to go home to her "woman's shelter"  Pain/Comfort:  Pain Rating 1: 0/10    Patients cultural, spiritual, Jain conflicts given the current situation:      Living Environment:  Pt reports living at "woman's shelter" prior. Per chart review pt was admitted from [facility] and was homeless prior.   Prior to admission, patients level of function was Independent (per pt report).  Equipment used at home: none.  DME owned (not currently used): none.  Upon discharge, patient will have assistance from no one.    Objective:     Communicated with [facility] prior to session.  Patient found supine with peripheral IV, telemetry  " upon PT entry to room.    General Precautions: Standard, fall  Orthopedic Precautions:N/A   Braces: N/A  Respiratory Status: Room air    Exams:  Cognitive Exam:  Patient is oriented to Person and Place  RLE ROM: WFL  RLE Strength: WFL  LLE ROM: generalized weakness   LLE Strength: generalized weakness    Functional Mobility:  Bed Mobility:     Rolling Left:  moderate assistance  Rolling Right: moderate assistance  Scooting: moderate assistance  Supine to Sit: moderate assistance  Sit to Supine: moderate assistance  Transfers:     Sit to Stand:  unable to perform with rolling walker      AM-PAC 6 CLICK MOBILITY  Total Score:8       Treatment & Education:  Pt educated on purpose of PT, call don't fall, and being OOB throughout the day. Pt also educated on exercise within the room daily including: LAQ, heel rocks, ankle pumps, and hip add and abduction.     Evaluation completed. Pt found supine in bed upon arrival with some confusion and only oriented to person and place. Pt performed rolling L to R with mod A. Pt assisted to sitting EOB with mod A. Pt sat 1 minute then reports significant dizziness and returned to supine. RN present to assess vitals after return to supine.     Patient left supine with all lines intact, call button in reach, bed alarm on, and RN and MD present.    GOALS:   Multidisciplinary Problems       Physical Therapy Goals          Problem: Physical Therapy    Goal Priority Disciplines Outcome Goal Variances Interventions   Physical Therapy Goal     PT, PT/OT Ongoing, Progressing     Description: All LTGs to be met by 11/4/23    Bed mobility I  Transfers I  Gait of at least 150' mod I   Pt will increase AMPAC score by 2 points to progress functional mobility  Pt will tolerate > 10 min of functional activity to progress gross functional mobility                          History:     Past Medical History:   Diagnosis Date    Anticoagulant long-term use     Bipolar disorder, unspecified     Bipolar  disorder, unspecified     CHF (congestive heart failure)     CKD (chronic kidney disease)     Diabetes mellitus     Hypertension     Hypothyroidism, unspecified     Paroxysmal atrial fibrillation        History reviewed. No pertinent surgical history.    Time Tracking:     PT Received On: 10/21/23  PT Start Time: 0805     PT Stop Time: 0830  PT Total Time (min): 25 min     Billable Minutes: Evaluation 15 and Therapeutic Activity 10      10/21/2023

## 2023-10-21 NOTE — PROGRESS NOTES
[facility] Medicine  Progress Note    Patient Name: Alejandra Strong  MRN: [id number]  Patient Class: IP- Inpatient   Admission Date: 10/19/2023  Length of Stay: 1 days  Attending Physician: Debora White MD  Primary Care Provider: Mer, Primary Doctor        Subjective:     Principal Problem:Atrial fibrillation with rapid ventricular response        HPI:  Patient is a 63-year-old female who has  has a past medical history of Anticoagulant long-term use, Bipolar disorder unspecified, CHF (congestive heart failure) reduced ejection fraction, CKD 3 (chronic kidney disease), Diabetes mellitus type 2, Hypertension, Hypothyroidism, unspecified, and Paroxysmal atrial fibrillation.     Patient initially presented to [facility] with complaints of visual and auditory hallucinations.  She was noted to be in AFib/flutter with RVR.  She was admitted to the hospital under pec her rate was controlled with amiodarone, Toprol-XL 50 mg a day and she was discharged to [facility] for further treatment of her bipolar disorder.  She she was discharged 10/11/2023 to [facility].  She is been seen by PCP and Psychiatry there until today when she was noted to have significant swelling in her legs and feet and sent under a pec/CEC for evaluation.  In the emergency department she was noted to be in AFib/flutter rate of 114.  She was also noted to have potassium of 2.7, calcium of 5.4 corrected to 7.2 and a BNP at 9:12 a.m. which is down from 1993 on October 10th.  She was given IV Lasix calcium and potassium in the emergency department.  Her initial troponin was negative.  Patient is seen lethargic but arousable.  Knows she is in the hospital, the date, as well as her birth date.  She denies any chest pain or shortness a breath.    She is admitted under a pec/CEC will need a sitter and will consult psych for help with her meds in a.m.      Overview/Hospital Course:  Patient  admitted cardiology and Psychiatry were consulted.  Patient was begun on amiodarone drip for control for AFib with RVR.  Meds were adjusted per Psychiatry.    Patient being seen by Cardiology.  She was started on amnio drip to attempted controlled rate.  Overnight her heart rate remained high and her blood pressure this morning was low cardiology will be evaluating her soon.      Interval History:  Patient reports she is ready to go home.  She denies any complaints other than being weak.  Discussed with Cardiology who recommend did loading dose due to blood pressure being on the lower side.    Review of Systems   Constitutional:  Positive for fatigue. Negative for fever.   Respiratory:  Positive for shortness of breath. Negative for cough.    Cardiovascular:  Positive for palpitations and leg swelling. Negative for chest pain.   Gastrointestinal:  Negative for nausea and vomiting.   Neurological:  Positive for weakness.   Psychiatric/Behavioral:  Positive for dysphoric mood.    All other systems reviewed and are negative.    Objective:     Vital Signs (Most Recent):  Temp: 98 °F (36.7 °C) (10/21/23 0836)  Pulse: (!) 123 (10/21/23 0836)  Resp: 18 (10/21/23 0836)  BP: 124/66 (10/21/23 0836)  SpO2: (!) 93 % (10/21/23 0836) Vital Signs (24h Range):  Temp:  [98 °F (36.7 °C)-98.8 °F (37.1 °C)] 98 °F (36.7 °C)  Pulse:  [115-147] 123  Resp:  [16-20] 18  SpO2:  [92 %-95 %] 93 %  BP: (111-124)/(66-93) 124/66     Weight: 74.5 kg (164 lb 3.9 oz)  Body mass index is 29.09 kg/m².    Intake/Output Summary (Last 24 hours) at 10/21/2023 1155  Last data filed at 10/21/2023 0924  Gross per 24 hour   Intake 200 ml   Output 600 ml   Net -400 ml         Physical Exam  Vitals reviewed.   Constitutional:       Appearance: She is obese. She is ill-appearing (chronically).   HENT:      Head: Normocephalic and atraumatic.      Mouth/Throat:      Mouth: Mucous membranes are moist.      Pharynx: Oropharynx is clear.   Eyes:      General:         " Left eye: Discharge present.     Extraocular Movements: Extraocular movements intact.   Cardiovascular:      Rate and Rhythm: Tachycardia present. Rhythm irregular.      Pulses: Normal pulses.      Heart sounds: Normal heart sounds.   Pulmonary:      Effort: Pulmonary effort is normal.      Breath sounds: Normal breath sounds.   Abdominal:      General: Bowel sounds are normal.      Palpations: Abdomen is soft.   Musculoskeletal:         General: Normal range of motion.      Cervical back: Normal range of motion and neck supple.      Right lower leg: Edema present.      Left lower leg: Edema present.   Skin:     General: Skin is warm and dry.   Neurological:      Mental Status: She is alert and oriented to person, place, and time. Mental status is at baseline.      Motor: Weakness present.           Significant Labs: All pertinent labs within the past 24 hours have been reviewed.  Blood Culture: No results for input(s): "LABBLOO" in the last 48 hours.  CBC:   Recent Labs   Lab 10/19/23  1450 10/20/23  0529 10/21/23  0500   WBC 5.29 4.22 5.89   HGB 7.2* 7.8* 7.6*   HCT 25.4* 27.8* 27.2*    144* 154     CMP:   Recent Labs   Lab 10/19/23  1450 10/20/23  0529 10/21/23  0500    140 138   K 2.7* 4.4 4.6   * 104 98   CO2 18* 27 28   GLU 88 118* 148*   BUN 20 28* 27*   CREATININE 0.8 1.4 1.6*   CALCIUM 5.4* 8.6* 8.2*   PROT 4.2* 6.3 6.9   ALBUMIN 1.8* 2.6* 2.8*   BILITOT 0.3 0.5 0.4   ALKPHOS 40* 50* 63   AST 21 30 38   ALT 13 18 23   ANIONGAP 6* 9 12       Magnesium:   Recent Labs   Lab 10/20/23  0529 10/21/23  0500   MG 1.7 2.6     TSH:   Recent Labs   Lab 10/20/23  0529   TSH 17.812*         Significant Imaging: I have reviewed all pertinent imaging results/findings within the past 24 hours.      Assessment/Plan:      * Atrial fibrillation with rapid ventricular response  Patient with Long standing persistent (>12 months) atrial fibrillation which is uncontrolled currently with Beta Blocker and " Amiodarone. Patient is currently in atrial fibrillation.[id number] Score: 1. HASBLED Score: . Anticoagulation indicated. Anticoagulation done with Xarelto     Continue outpatient amiodarone and metoprolol  Appreciate cardiology consult.  Patient was placed on amiodarone drip    Discussing with Cardiology who recommended loading dose of due to at 0.25 mg q.6 hours x4 doses  Due to lower blood pressure metoprolol changed 12.5 mg b.i.d.    Stage 3b chronic kidney disease  In reviewing patient's records her creatinine has typically run somewhere between 2 and 2.3 at least as far back as 2018.  I suspect with her decreased cardiac function and volume overload that she is returning to her baseline function      Acute on chronic combined systolic and diastolic congestive heart failure  Patient is identified as having Combined Systolic and Diastolic heart failure that is Acute on chronic. CHF is currently uncontrolled due to Continued edema of extremities. Latest ECHO performed and demonstrates- Results for orders placed during the hospital encounter of 10/08/23    Echo    Interpretation Summary    Rhythm is atrial flutter.    Left Ventricle: The left ventricle is normal in size. Normal wall thickness. There is moderately reduced systolic function with a visually estimated ejection fraction of 35 - 40%.    Left Atrium: Left atrium is moderately dilated.    Right Ventricle: Mild right ventricular enlargement. Systolic function is mildly reduced.    Right Atrium: Right atrium is moderately dilated.    Aortic Valve: The aortic valve is a trileaflet valve. There is mild aortic valve sclerosis.    Mitral Valve: There is mild to moderate regurgitation.    Tricuspid Valve: There is moderate regurgitation.    Pulmonic Valve: There is mild regurgitation.    Pulmonary Artery: The estimated pulmonary artery systolic pressure is 51 mmHg.    IVC/SVC: Elevated venous pressure at 15 mmHg.    Pericardium: There is a trivial  effusion.  . Continue Beta Blocker and Furosemide and monitor clinical status closely. Monitor on telemetry. Patient is off CHF pathway.  Monitor strict Is&Os and daily weights.  Place on fluid restriction of 1.5 L. Cardiology has not been any consulted. Continue to stress to patient importance of self efficacy and  on diet for CHF. Last BNP reviewed- and noted below   Recent Labs   Lab 10/21/23  1231   BNP 1,163*   .    Hypothyroidism  Patient currently on replacement dose we will continue and recheck in a.m.      Essential hypertension  Lopressor 50 mg p.o. q.6 hours adjusted to 12.5 mg q.12 hours due to lower blood pressure        Dyslipidemia  Continue patient's outpatient statin      Bipolar disorder  Patient admitted from [facility]  [facility] a [facility] and [facility] prior to admission had auditory and visual hallucinations currently she was hyperactive with delusions and unable to understand her situation or take her medications.  She was started on Depakote  mg b.i.d. Zoloft 100 mg daily trazodone 100 mg q.h.s. was just recently started on Risperdal 1 mg p.o. b.i.d. for visual hallucinations.  At the time they were considering nursing home versus group home placement    Appreciate telepsych consult meds were adjusted per their recommendations        VTE Risk Mitigation (From admission, onward)         Ordered     rivaroxaban tablet 15 mg  With dinner         10/20/23 0938     Reason for No Pharmacological VTE Prophylaxis  Once        Question:  Reasons:  Answer:  Already adequately anticoagulated on oral Anticoagulants    10/19/23 1750     IP VTE HIGH RISK PATIENT  Once         10/19/23 1750     Place sequential compression device  Until discontinued         10/19/23 1750                Discharge Planning   EBEN:      Code Status: Full Code   Is the patient medically ready for discharge?: No    Reason for patient still in  hospital (select all that apply): Patient trending condition, Treatment and Consult recommendations  Discharge Plan A: Psychiatric hospital                  Debora Trinh MD  Department of Hospital Medicine   '[place] - Telemetry ([facility])

## 2023-10-21 NOTE — SUBJECTIVE & OBJECTIVE
Interval History:  Patient reports she is ready to go home.  She denies any complaints other than being weak.  Discussed with Cardiology who recommend did loading dose due to blood pressure being on the lower side.    Review of Systems   Constitutional:  Positive for fatigue. Negative for fever.   Respiratory:  Positive for shortness of breath. Negative for cough.    Cardiovascular:  Positive for palpitations and leg swelling. Negative for chest pain.   Gastrointestinal:  Negative for nausea and vomiting.   Neurological:  Positive for weakness.   Psychiatric/Behavioral:  Positive for dysphoric mood.    All other systems reviewed and are negative.    Objective:     Vital Signs (Most Recent):  Temp: 98 °F (36.7 °C) (10/21/23 0836)  Pulse: (!) 123 (10/21/23 0836)  Resp: 18 (10/21/23 0836)  BP: 124/66 (10/21/23 0836)  SpO2: (!) 93 % (10/21/23 0836) Vital Signs (24h Range):  Temp:  [98 °F (36.7 °C)-98.8 °F (37.1 °C)] 98 °F (36.7 °C)  Pulse:  [115-147] 123  Resp:  [16-20] 18  SpO2:  [92 %-95 %] 93 %  BP: (111-124)/(66-93) 124/66     Weight: 74.5 kg (164 lb 3.9 oz)  Body mass index is 29.09 kg/m².    Intake/Output Summary (Last 24 hours) at 10/21/2023 1155  Last data filed at 10/21/2023 0924  Gross per 24 hour   Intake 200 ml   Output 600 ml   Net -400 ml         Physical Exam  Vitals reviewed.   Constitutional:       Appearance: She is obese. She is ill-appearing (chronically).   HENT:      Head: Normocephalic and atraumatic.      Mouth/Throat:      Mouth: Mucous membranes are moist.      Pharynx: Oropharynx is clear.   Eyes:      General:         Left eye: Discharge present.     Extraocular Movements: Extraocular movements intact.   Cardiovascular:      Rate and Rhythm: Tachycardia present. Rhythm irregular.      Pulses: Normal pulses.      Heart sounds: Normal heart sounds.   Pulmonary:      Effort: Pulmonary effort is normal.      Breath sounds: Normal breath sounds.   Abdominal:      General: Bowel sounds are normal.  "     Palpations: Abdomen is soft.   Musculoskeletal:         General: Normal range of motion.      Cervical back: Normal range of motion and neck supple.      Right lower leg: Edema present.      Left lower leg: Edema present.   Skin:     General: Skin is warm and dry.   Neurological:      Mental Status: She is alert and oriented to person, place, and time. Mental status is at baseline.      Motor: Weakness present.           Significant Labs: All pertinent labs within the past 24 hours have been reviewed.  Blood Culture: No results for input(s): "LABBLOO" in the last 48 hours.  CBC:   Recent Labs   Lab 10/19/23  1450 10/20/23  0529 10/21/23  0500   WBC 5.29 4.22 5.89   HGB 7.2* 7.8* 7.6*   HCT 25.4* 27.8* 27.2*    144* 154     CMP:   Recent Labs   Lab 10/19/23  1450 10/20/23  0529 10/21/23  0500    140 138   K 2.7* 4.4 4.6   * 104 98   CO2 18* 27 28   GLU 88 118* 148*   BUN 20 28* 27*   CREATININE 0.8 1.4 1.6*   CALCIUM 5.4* 8.6* 8.2*   PROT 4.2* 6.3 6.9   ALBUMIN 1.8* 2.6* 2.8*   BILITOT 0.3 0.5 0.4   ALKPHOS 40* 50* 63   AST 21 30 38   ALT 13 18 23   ANIONGAP 6* 9 12       Magnesium:   Recent Labs   Lab 10/20/23  0529 10/21/23  0500   MG 1.7 2.6     TSH:   Recent Labs   Lab 10/20/23  0529   TSH 17.812*         Significant Imaging: I have reviewed all pertinent imaging results/findings within the past 24 hours.  "

## 2023-10-21 NOTE — ASSESSMENT & PLAN NOTE
In reviewing patient's records her creatinine has typically run somewhere between 2 and 2.3 at least as far back as 2018.  I suspect with her decreased cardiac function and volume overload that she is returning to her baseline function

## 2023-10-21 NOTE — ASSESSMENT & PLAN NOTE
Patient is identified as having Combined Systolic and Diastolic heart failure that is Acute on chronic. CHF is currently uncontrolled due to Continued edema of extremities. Latest ECHO performed and demonstrates- Results for orders placed during the hospital encounter of 10/08/23    Echo    Interpretation Summary    Rhythm is atrial flutter.    Left Ventricle: The left ventricle is normal in size. Normal wall thickness. There is moderately reduced systolic function with a visually estimated ejection fraction of 35 - 40%.    Left Atrium: Left atrium is moderately dilated.    Right Ventricle: Mild right ventricular enlargement. Systolic function is mildly reduced.    Right Atrium: Right atrium is moderately dilated.    Aortic Valve: The aortic valve is a trileaflet valve. There is mild aortic valve sclerosis.    Mitral Valve: There is mild to moderate regurgitation.    Tricuspid Valve: There is moderate regurgitation.    Pulmonic Valve: There is mild regurgitation.    Pulmonary Artery: The estimated pulmonary artery systolic pressure is 51 mmHg.    IVC/SVC: Elevated venous pressure at 15 mmHg.    Pericardium: There is a trivial effusion.  . Continue Beta Blocker and Furosemide and monitor clinical status closely. Monitor on telemetry. Patient is off CHF pathway.  Monitor strict Is&Os and daily weights.  Place on fluid restriction of 1.5 L. Cardiology has not been any consulted. Continue to stress to patient importance of self efficacy and  on diet for CHF. Last BNP reviewed- and noted below   Recent Labs   Lab 10/21/23  1231   BNP 1,163*   .

## 2023-10-22 LAB
ALBUMIN SERPL BCP-MCNC: 2.8 G/DL (ref 3.5–5.2)
ALP SERPL-CCNC: 66 U/L (ref 55–135)
ALT SERPL W/O P-5'-P-CCNC: 21 U/L (ref 10–44)
ANION GAP SERPL CALC-SCNC: 10 MMOL/L (ref 8–16)
AST SERPL-CCNC: 32 U/L (ref 10–40)
BASOPHILS # BLD AUTO: 0.05 K/UL (ref 0–0.2)
BASOPHILS NFR BLD: 0.9 % (ref 0–1.9)
BILIRUB SERPL-MCNC: 0.4 MG/DL (ref 0.1–1)
BUN SERPL-MCNC: 20 MG/DL (ref 8–23)
CALCIUM SERPL-MCNC: 8.4 MG/DL (ref 8.7–10.5)
CHLORIDE SERPL-SCNC: 105 MMOL/L (ref 95–110)
CO2 SERPL-SCNC: 27 MMOL/L (ref 23–29)
CREAT SERPL-MCNC: 1.4 MG/DL (ref 0.5–1.4)
DIFFERENTIAL METHOD: ABNORMAL
EOSINOPHIL # BLD AUTO: 0.1 K/UL (ref 0–0.5)
EOSINOPHIL NFR BLD: 0.9 % (ref 0–8)
ERYTHROCYTE [DISTWIDTH] IN BLOOD BY AUTOMATED COUNT: 18.1 % (ref 11.5–14.5)
EST. GFR  (NO RACE VARIABLE): 42 ML/MIN/1.73 M^2
GLUCOSE SERPL-MCNC: 102 MG/DL (ref 70–110)
HCT VFR BLD AUTO: 28.9 % (ref 37–48.5)
HGB BLD-MCNC: 8 G/DL (ref 12–16)
IMM GRANULOCYTES # BLD AUTO: 0.09 K/UL (ref 0–0.04)
IMM GRANULOCYTES NFR BLD AUTO: 1.6 % (ref 0–0.5)
LYMPHOCYTES # BLD AUTO: 0.8 K/UL (ref 1–4.8)
LYMPHOCYTES NFR BLD: 15.1 % (ref 18–48)
MAGNESIUM SERPL-MCNC: 2.2 MG/DL (ref 1.6–2.6)
MCH RBC QN AUTO: 23.3 PG (ref 27–31)
MCHC RBC AUTO-ENTMCNC: 27.7 G/DL (ref 32–36)
MCV RBC AUTO: 84 FL (ref 82–98)
MONOCYTES # BLD AUTO: 0.7 K/UL (ref 0.3–1)
MONOCYTES NFR BLD: 12.7 % (ref 4–15)
NEUTROPHILS # BLD AUTO: 3.8 K/UL (ref 1.8–7.7)
NEUTROPHILS NFR BLD: 68.8 % (ref 38–73)
NRBC BLD-RTO: 0 /100 WBC
PHOSPHATE SERPL-MCNC: 4 MG/DL (ref 2.7–4.5)
PLATELET # BLD AUTO: 152 K/UL (ref 150–450)
PMV BLD AUTO: 12.1 FL (ref 9.2–12.9)
POTASSIUM SERPL-SCNC: 4.5 MMOL/L (ref 3.5–5.1)
PROT SERPL-MCNC: 6.9 G/DL (ref 6–8.4)
RBC # BLD AUTO: 3.43 M/UL (ref 4–5.4)
SODIUM SERPL-SCNC: 142 MMOL/L (ref 136–145)
WBC # BLD AUTO: 5.5 K/UL (ref 3.9–12.7)

## 2023-10-22 PROCEDURE — 25000003 PHARM REV CODE 250: Performed by: INTERNAL MEDICINE

## 2023-10-22 PROCEDURE — 80053 COMPREHEN METABOLIC PANEL: CPT | Performed by: INTERNAL MEDICINE

## 2023-10-22 PROCEDURE — 99233 SBSQ HOSP IP/OBS HIGH 50: CPT | Mod: 25,,, | Performed by: STUDENT IN AN ORGANIZED HEALTH CARE EDUCATION/TRAINING PROGRAM

## 2023-10-22 PROCEDURE — 83735 ASSAY OF MAGNESIUM: CPT | Performed by: INTERNAL MEDICINE

## 2023-10-22 PROCEDURE — 63600175 PHARM REV CODE 636 W HCPCS

## 2023-10-22 PROCEDURE — 84100 ASSAY OF PHOSPHORUS: CPT | Performed by: INTERNAL MEDICINE

## 2023-10-22 PROCEDURE — 85025 COMPLETE CBC W/AUTO DIFF WBC: CPT | Performed by: INTERNAL MEDICINE

## 2023-10-22 PROCEDURE — 99233 PR SUBSEQUENT HOSPITAL CARE,LEVL III: ICD-10-PCS | Mod: 25,,, | Performed by: STUDENT IN AN ORGANIZED HEALTH CARE EDUCATION/TRAINING PROGRAM

## 2023-10-22 PROCEDURE — 63600175 PHARM REV CODE 636 W HCPCS: Performed by: INTERNAL MEDICINE

## 2023-10-22 PROCEDURE — 94799 UNLISTED PULMONARY SVC/PX: CPT

## 2023-10-22 PROCEDURE — 93010 EKG 12-LEAD: ICD-10-PCS | Mod: ,,, | Performed by: STUDENT IN AN ORGANIZED HEALTH CARE EDUCATION/TRAINING PROGRAM

## 2023-10-22 PROCEDURE — 21400001 HC TELEMETRY ROOM

## 2023-10-22 PROCEDURE — 93010 ELECTROCARDIOGRAM REPORT: CPT | Mod: ,,, | Performed by: STUDENT IN AN ORGANIZED HEALTH CARE EDUCATION/TRAINING PROGRAM

## 2023-10-22 PROCEDURE — 93005 ELECTROCARDIOGRAM TRACING: CPT

## 2023-10-22 PROCEDURE — 25000003 PHARM REV CODE 250: Performed by: STUDENT IN AN ORGANIZED HEALTH CARE EDUCATION/TRAINING PROGRAM

## 2023-10-22 PROCEDURE — 36415 COLL VENOUS BLD VENIPUNCTURE: CPT | Performed by: INTERNAL MEDICINE

## 2023-10-22 PROCEDURE — 99900035 HC TECH TIME PER 15 MIN (STAT)

## 2023-10-22 RX ORDER — AMIODARONE HYDROCHLORIDE 200 MG/1
200 TABLET ORAL DAILY
Status: DISCONTINUED | OUTPATIENT
Start: 2023-11-05 | End: 2023-10-31 | Stop reason: HOSPADM

## 2023-10-22 RX ORDER — AMIODARONE HYDROCHLORIDE 200 MG/1
400 TABLET ORAL 2 TIMES DAILY
Status: COMPLETED | OUTPATIENT
Start: 2023-10-22 | End: 2023-10-28

## 2023-10-22 RX ORDER — DIGOXIN 125 MCG
0.12 TABLET ORAL DAILY
Status: DISCONTINUED | OUTPATIENT
Start: 2023-10-23 | End: 2023-10-25

## 2023-10-22 RX ORDER — AMIODARONE HYDROCHLORIDE 200 MG/1
400 TABLET ORAL DAILY
Status: DISCONTINUED | OUTPATIENT
Start: 2023-10-29 | End: 2023-10-31 | Stop reason: HOSPADM

## 2023-10-22 RX ORDER — METOPROLOL SUCCINATE 50 MG/1
50 TABLET, EXTENDED RELEASE ORAL DAILY
Status: DISCONTINUED | OUTPATIENT
Start: 2023-10-22 | End: 2023-10-31 | Stop reason: HOSPADM

## 2023-10-22 RX ORDER — HYDRALAZINE HYDROCHLORIDE 20 MG/ML
10 INJECTION INTRAMUSCULAR; INTRAVENOUS EVERY 8 HOURS PRN
Status: DISCONTINUED | OUTPATIENT
Start: 2023-10-22 | End: 2023-10-31 | Stop reason: HOSPADM

## 2023-10-22 RX ORDER — METOPROLOL TARTRATE 25 MG/1
25 TABLET, FILM COATED ORAL 3 TIMES DAILY
Status: DISCONTINUED | OUTPATIENT
Start: 2023-10-22 | End: 2023-10-22

## 2023-10-22 RX ORDER — TORSEMIDE 10 MG/1
40 TABLET ORAL DAILY
Status: DISCONTINUED | OUTPATIENT
Start: 2023-10-22 | End: 2023-10-23

## 2023-10-22 RX ADMIN — METOPROLOL SUCCINATE 50 MG: 50 TABLET, EXTENDED RELEASE ORAL at 06:10

## 2023-10-22 RX ADMIN — TOBRAMYCIN AND DEXAMETHASONE 1 DROP: 3; 1 SUSPENSION/ DROPS OPHTHALMIC at 06:10

## 2023-10-22 RX ADMIN — TOBRAMYCIN AND DEXAMETHASONE 1 DROP: 3; 1 SUSPENSION/ DROPS OPHTHALMIC at 10:10

## 2023-10-22 RX ADMIN — TRAZODONE HYDROCHLORIDE 100 MG: 100 TABLET ORAL at 09:10

## 2023-10-22 RX ADMIN — TOBRAMYCIN AND DEXAMETHASONE 1 DROP: 3; 1 SUSPENSION/ DROPS OPHTHALMIC at 05:10

## 2023-10-22 RX ADMIN — LEVOTHYROXINE SODIUM 50 MCG: 50 TABLET ORAL at 05:10

## 2023-10-22 RX ADMIN — TORSEMIDE 40 MG: 10 TABLET ORAL at 06:10

## 2023-10-22 RX ADMIN — IRON SUCROSE 200 MG: 20 INJECTION, SOLUTION INTRAVENOUS at 10:10

## 2023-10-22 RX ADMIN — TOBRAMYCIN AND DEXAMETHASONE 1 DROP: 3; 1 SUSPENSION/ DROPS OPHTHALMIC at 02:10

## 2023-10-22 RX ADMIN — SERTRALINE HYDROCHLORIDE 50 MG: 50 TABLET ORAL at 10:10

## 2023-10-22 RX ADMIN — AMIODARONE HYDROCHLORIDE 0.5 MG/MIN: 1.8 INJECTION, SOLUTION INTRAVENOUS at 06:10

## 2023-10-22 RX ADMIN — SENNOSIDES AND DOCUSATE SODIUM 1 TABLET: 8.6; 5 TABLET ORAL at 09:10

## 2023-10-22 RX ADMIN — AMIODARONE HYDROCHLORIDE 400 MG: 200 TABLET ORAL at 09:10

## 2023-10-22 RX ADMIN — METOPROLOL TARTRATE 25 MG: 25 TABLET, FILM COATED ORAL at 10:10

## 2023-10-22 RX ADMIN — SENNOSIDES AND DOCUSATE SODIUM 1 TABLET: 8.6; 5 TABLET ORAL at 10:10

## 2023-10-22 RX ADMIN — METOPROLOL TARTRATE 25 MG: 25 TABLET, FILM COATED ORAL at 02:10

## 2023-10-22 RX ADMIN — DIGOXIN 250 MCG: 250 INJECTION, SOLUTION INTRAMUSCULAR; INTRAVENOUS at 05:10

## 2023-10-22 RX ADMIN — MELATONIN TAB 3 MG 3 MG: 3 TAB at 09:10

## 2023-10-22 RX ADMIN — AMIODARONE HYDROCHLORIDE 400 MG: 200 TABLET ORAL at 11:10

## 2023-10-22 RX ADMIN — DIGOXIN 250 MCG: 250 INJECTION, SOLUTION INTRAMUSCULAR; INTRAVENOUS at 11:10

## 2023-10-22 RX ADMIN — ATORVASTATIN CALCIUM 20 MG: 10 TABLET, FILM COATED ORAL at 10:10

## 2023-10-22 RX ADMIN — DIVALPROEX SODIUM 250 MG: 250 TABLET, DELAYED RELEASE ORAL at 09:10

## 2023-10-22 RX ADMIN — RIVAROXABAN 15 MG: 15 TABLET, FILM COATED ORAL at 06:10

## 2023-10-22 RX ADMIN — DIVALPROEX SODIUM 250 MG: 250 TABLET, DELAYED RELEASE ORAL at 10:10

## 2023-10-22 RX ADMIN — DIGOXIN 250 MCG: 250 INJECTION, SOLUTION INTRAMUSCULAR; INTRAVENOUS at 12:10

## 2023-10-22 RX ADMIN — CALCIUM CARBONATE (ANTACID) CHEW TAB 500 MG 500 MG: 500 CHEW TAB at 10:10

## 2023-10-22 RX ADMIN — TOBRAMYCIN AND DEXAMETHASONE 1 DROP: 3; 1 SUSPENSION/ DROPS OPHTHALMIC at 09:10

## 2023-10-22 NOTE — SUBJECTIVE & OBJECTIVE
Interval History:  Patient seen and examined at bedside.  Sitter in room.  Patient is weak and asking to go home.  She reports home is a women's homeless shelter.    Review of Systems   Constitutional:  Positive for activity change, appetite change and fatigue. Negative for fever.   Respiratory:  Positive for chest tightness.    Cardiovascular:  Positive for leg swelling. Negative for chest pain.   Gastrointestinal:  Negative for nausea and vomiting.   Neurological:  Positive for weakness.   Psychiatric/Behavioral:  Positive for behavioral problems.    All other systems reviewed and are negative.    Objective:     Vital Signs (Most Recent):  Temp: 98 °F (36.7 °C) (10/22/23 1514)  Pulse: 85 (10/22/23 1514)  Resp: 17 (10/22/23 1514)  BP: (!) 169/82 (10/22/23 1514)  SpO2: 95 % (10/22/23 1514) Vital Signs (24h Range):  Temp:  [96.7 °F (35.9 °C)-98.2 °F (36.8 °C)] 98 °F (36.7 °C)  Pulse:  [] 85  Resp:  [16-20] 17  SpO2:  [92 %-96 %] 95 %  BP: (124-169)/() 169/82     Weight: 71.5 kg (157 lb 10.1 oz)  Body mass index is 27.92 kg/m².    Intake/Output Summary (Last 24 hours) at 10/22/2023 1630  Last data filed at 10/22/2023 1426  Gross per 24 hour   Intake 240 ml   Output 1800 ml   Net -1560 ml         Physical Exam  Vitals reviewed.   Constitutional:       Appearance: Normal appearance. She is ill-appearing.   HENT:      Head: Normocephalic and atraumatic.      Mouth/Throat:      Mouth: Mucous membranes are moist.      Pharynx: Oropharynx is clear.   Eyes:      Extraocular Movements: Extraocular movements intact.      Conjunctiva/sclera: Conjunctivae normal.   Cardiovascular:      Rate and Rhythm: Normal rate. Rhythm irregular.      Pulses: Normal pulses.      Heart sounds: Normal heart sounds.   Pulmonary:      Effort: Pulmonary effort is normal.      Breath sounds: Normal breath sounds. No wheezing.   Abdominal:      General: Bowel sounds are normal.      Palpations: Abdomen is soft.   Musculoskeletal:          General: Normal range of motion.      Cervical back: Normal range of motion and neck supple.   Skin:     General: Skin is warm and dry.   Neurological:      General: No focal deficit present.      Mental Status: She is alert and oriented to person, place, and time. Mental status is at baseline.             Significant Labs: All pertinent labs within the past 24 hours have been reviewed.  A1C:   Recent Labs   Lab 10/09/23  0501 10/19/23  2018   HGBA1C 5.8 6.1*     CBC:   Recent Labs   Lab 10/21/23  0500 10/22/23  0452   WBC 5.89 5.50   HGB 7.6* 8.0*   HCT 27.2* 28.9*    152     CMP:   Recent Labs   Lab 10/21/23  0500 10/22/23  0452    142   K 4.6 4.5   CL 98 105   CO2 28 27   * 102   BUN 27* 20   CREATININE 1.6* 1.4   CALCIUM 8.2* 8.4*   PROT 6.9 6.9   ALBUMIN 2.8* 2.8*   BILITOT 0.4 0.4   ALKPHOS 63 66   AST 38 32   ALT 23 21   ANIONGAP 12 10     Cardiac Markers:   Recent Labs   Lab 10/21/23  1231   BNP 1,163*       Lactic Acid:   Recent Labs   Lab 10/21/23  1231   LACTATE 1.1       Magnesium:   Recent Labs   Lab 10/21/23  0500 10/22/23  0452   MG 2.6 2.2       TSH:   Recent Labs   Lab 10/20/23  0529   TSH 17.812*         Significant Imaging: I have reviewed all pertinent imaging results/findings within the past 24 hours.

## 2023-10-22 NOTE — ASSESSMENT & PLAN NOTE
Patient admitted from [facility]  She has a diagnosis of bipolar just is under [facility] is reportedly homeless and there prior to admission had auditory and visual hallucinations currently she was hyperactive with delusions and unable to understand her situation or take her medications.  She was started on Depakote  mg b.i.d. Zoloft 100 mg daily trazodone 100 mg q.h.s. was just recently started on Risperdal 1 mg p.o. b.i.d. for visual hallucinations.  At the time they were considering nursing home versus group home placement    Appreciate telepsych consult meds were adjusted per their recommendations

## 2023-10-22 NOTE — PLAN OF CARE
Problem: Violence Risk or Actual  Goal: Anger and Impulse Control  10/21/2023 2216 by Brian Torres RN  Outcome: Ongoing, Progressing  10/21/2023 2216 by Brian Torres RN  Outcome: Ongoing, Progressing     Problem: Adult Inpatient Plan of Care  Goal: Plan of Care Review  10/21/2023 2216 by Brian Torres RN  Outcome: Ongoing, Progressing  10/21/2023 2216 by Brian Torres RN  Outcome: Ongoing, Progressing  Goal: Patient-Specific Goal (Individualized)  10/21/2023 2216 by Brian Torres RN  Outcome: Ongoing, Progressing  10/21/2023 2216 by Brian Torres RN  Outcome: Ongoing, Progressing  Goal: Absence of Hospital-Acquired Illness or Injury  10/21/2023 2216 by Brian Torres RN  Outcome: Ongoing, Progressing  10/21/2023 2216 by Brian Torres RN  Outcome: Ongoing, Progressing  Goal: Optimal Comfort and Wellbeing  10/21/2023 2216 by Brian Torres RN  Outcome: Ongoing, Progressing  10/21/2023 2216 by Brian Torres RN  Outcome: Ongoing, Progressing  Goal: Readiness for Transition of Care  10/21/2023 2216 by Brian Torres RN  Outcome: Ongoing, Progressing  10/21/2023 2216 by Brian Torres RN  Outcome: Ongoing, Progressing     Problem: Skin Injury Risk Increased  Goal: Skin Health and Integrity  10/21/2023 2216 by Brian Torres RN  Outcome: Ongoing, Progressing  10/21/2023 2216 by Brian Torres RN  Outcome: Ongoing, Progressing

## 2023-10-22 NOTE — PROGRESS NOTES
[facility] Medicine  Progress Note    Patient Name: Alejandra Strong  MRN: [id number]  Patient Class: IP- Inpatient   Admission Date: 10/19/2023  Length of Stay: 2 days  Attending Physician: Debora White MD  Primary Care Provider: Mer, Primary Doctor        Subjective:     Principal Problem:Atrial fibrillation with rapid ventricular response        HPI:  Patient is a 63-year-old female who has  has a past medical history of Anticoagulant long-term use, Bipolar disorder unspecified, CHF (congestive heart failure) reduced ejection fraction, CKD 3 (chronic kidney disease), Diabetes mellitus type 2, Hypertension, Hypothyroidism, unspecified, and Paroxysmal atrial fibrillation.     Patient initially presented to [facility] with complaints of visual and auditory hallucinations.  She was noted to be in AFib/flutter with RVR.  She was admitted to the hospital under pec her rate was controlled with amiodarone, Toprol-XL 50 mg a day and she was discharged to [facility] for further treatment of her bipolar disorder.  She she was discharged 10/11/2023 to [facility].  She is been seen by PCP and Psychiatry there until today when she was noted to have significant swelling in her legs and feet and sent under a pec/CEC for evaluation.  In the emergency department she was noted to be in AFib/flutter rate of 114.  She was also noted to have potassium of 2.7, calcium of 5.4 corrected to 7.2 and a BNP at 9:12 a.m. which is down from 1993 on October 10th.  She was given IV Lasix calcium and potassium in the emergency department.  Her initial troponin was negative.  Patient is seen lethargic but arousable.  Knows she is in the hospital, the date, as well as her birth date.  She denies any chest pain or shortness a breath.    She is admitted under a pec/CEC will need a sitter and will consult psych for help with her meds in a.m.      Overview/Hospital Course:  Patient  admitted cardiology and Psychiatry were consulted.  Patient was begun on amiodarone drip for control for AFib with RVR.  Meds were adjusted per Psychiatry.    Patient being seen by Cardiology.  She was started on amnio drip to attempted controlled rate.  Overnight her heart rate remained high and her blood pressure this morning was low cardiology will be evaluating her soon.    Seen by cardiology and low blood pressure was started on digoxin load for control of heart rate.  Reviewing her records seems that patient has had CKD for several years and likely we will need continued diuresis.      Interval History:  Patient seen and examined at bedside.  Sitter in room.  Patient is weak and asking to go home.  She reports home is a women's homeless shelter.    Review of Systems   Constitutional:  Positive for activity change, appetite change and fatigue. Negative for fever.   Respiratory:  Positive for chest tightness.    Cardiovascular:  Positive for leg swelling. Negative for chest pain.   Gastrointestinal:  Negative for nausea and vomiting.   Neurological:  Positive for weakness.   Psychiatric/Behavioral:  Positive for behavioral problems.    All other systems reviewed and are negative.    Objective:     Vital Signs (Most Recent):  Temp: 98 °F (36.7 °C) (10/22/23 1514)  Pulse: 85 (10/22/23 1514)  Resp: 17 (10/22/23 1514)  BP: (!) 169/82 (10/22/23 1514)  SpO2: 95 % (10/22/23 1514) Vital Signs (24h Range):  Temp:  [96.7 °F (35.9 °C)-98.2 °F (36.8 °C)] 98 °F (36.7 °C)  Pulse:  [] 85  Resp:  [16-20] 17  SpO2:  [92 %-96 %] 95 %  BP: (124-169)/() 169/82     Weight: 71.5 kg (157 lb 10.1 oz)  Body mass index is 27.92 kg/m².    Intake/Output Summary (Last 24 hours) at 10/22/2023 1630  Last data filed at 10/22/2023 1426  Gross per 24 hour   Intake 240 ml   Output 1800 ml   Net -1560 ml         Physical Exam  Vitals reviewed.   Constitutional:       Appearance: Normal appearance. She is ill-appearing.   HENT:       Head: Normocephalic and atraumatic.      Mouth/Throat:      Mouth: Mucous membranes are moist.      Pharynx: Oropharynx is clear.   Eyes:      Extraocular Movements: Extraocular movements intact.      Conjunctiva/sclera: Conjunctivae normal.   Cardiovascular:      Rate and Rhythm: Normal rate. Rhythm irregular.      Pulses: Normal pulses.      Heart sounds: Normal heart sounds.   Pulmonary:      Effort: Pulmonary effort is normal.      Breath sounds: Normal breath sounds. No wheezing.   Abdominal:      General: Bowel sounds are normal.      Palpations: Abdomen is soft.   Musculoskeletal:         General: Normal range of motion.      Cervical back: Normal range of motion and neck supple.   Skin:     General: Skin is warm and dry.   Neurological:      General: No focal deficit present.      Mental Status: She is alert and oriented to person, place, and time. Mental status is at baseline.             Significant Labs: All pertinent labs within the past 24 hours have been reviewed.  A1C:   Recent Labs   Lab 10/09/23  0501 10/19/23  2018   HGBA1C 5.8 6.1*     CBC:   Recent Labs   Lab 10/21/23  0500 10/22/23  0452   WBC 5.89 5.50   HGB 7.6* 8.0*   HCT 27.2* 28.9*    152     CMP:   Recent Labs   Lab 10/21/23  0500 10/22/23  0452    142   K 4.6 4.5   CL 98 105   CO2 28 27   * 102   BUN 27* 20   CREATININE 1.6* 1.4   CALCIUM 8.2* 8.4*   PROT 6.9 6.9   ALBUMIN 2.8* 2.8*   BILITOT 0.4 0.4   ALKPHOS 63 66   AST 38 32   ALT 23 21   ANIONGAP 12 10     Cardiac Markers:   Recent Labs   Lab 10/21/23  1231   BNP 1,163*       Lactic Acid:   Recent Labs   Lab 10/21/23  1231   LACTATE 1.1       Magnesium:   Recent Labs   Lab 10/21/23  0500 10/22/23  0452   MG 2.6 2.2       TSH:   Recent Labs   Lab 10/20/23  0529   TSH 17.812*         Significant Imaging: I have reviewed all pertinent imaging results/findings within the past 24 hours.      Assessment/Plan:      * Atrial fibrillation with rapid ventricular  response  Patient with Long standing persistent (>12 months) atrial fibrillation which is uncontrolled currently with Beta Blocker and Amiodarone. Patient is currently in atrial fibrillation.[id number] Score: 1. HASBLED Score: . Anticoagulation indicated. Anticoagulation done with Xarelto     Continue outpatient amiodarone and metoprolol  Appreciate cardiology consult.      Discussing with Cardiology who recommended loading dose of due to at 0.25 mg q.6 hours x4 doses      Stage 3b chronic kidney disease  In reviewing patient's records her creatinine has typically run somewhere between 2 and 2.3 at least as far back as 2018.  I suspect with her decreased cardiac function and volume overload that she is returning to her baseline function      Acute on chronic combined systolic and diastolic congestive heart failure  Patient is identified as having Combined Systolic and Diastolic heart failure that is Acute on chronic. CHF is currently uncontrolled due to Continued edema of extremities. Latest ECHO performed and demonstrates- Results for orders placed during the hospital encounter of 10/08/23    Echo    Interpretation Summary    Rhythm is atrial flutter.    Left Ventricle: The left ventricle is normal in size. Normal wall thickness. There is moderately reduced systolic function with a visually estimated ejection fraction of 35 - 40%.    Left Atrium: Left atrium is moderately dilated.    Right Ventricle: Mild right ventricular enlargement. Systolic function is mildly reduced.    Right Atrium: Right atrium is moderately dilated.    Aortic Valve: The aortic valve is a trileaflet valve. There is mild aortic valve sclerosis.    Mitral Valve: There is mild to moderate regurgitation.    Tricuspid Valve: There is moderate regurgitation.    Pulmonic Valve: There is mild regurgitation.    Pulmonary Artery: The estimated pulmonary artery systolic pressure is 51 mmHg.    IVC/SVC: Elevated venous pressure at 15 mmHg.     Pericardium: There is a trivial effusion.  . Continue Beta Blocker and Furosemide and monitor clinical status closely. Monitor on telemetry. Patient is off CHF pathway.  Monitor strict Is&Os and daily weights.  Place on fluid restriction of 1.5 L. Cardiology has not been any consulted. Continue to stress to patient importance of self efficacy and  on diet for CHF. Last BNP reviewed- and noted below   Recent Labs   Lab 10/21/23  1231   BNP 1,163*   .    Hypothyroidism  Patient currently on replacement dose we will continue and recheck in a.m.      Essential hypertension  Change back to Toprol-XL as blood pressure is now improved        Dyslipidemia  Continue patient's outpatient statin      Bipolar disorder  Patient admitted from [facility]  She has a diagnosis of bipolar just is under [facility] is reportedly homeless and there prior to admission had auditory and visual hallucinations currently she was hyperactive with delusions and unable to understand her situation or take her medications.  She was started on Depakote  mg b.i.d. Zoloft 100 mg daily trazodone 100 mg q.h.s. was just recently started on Risperdal 1 mg p.o. b.i.d. for visual hallucinations.  At the time they were considering nursing home versus group home placement    Appreciate telepsych consult meds were adjusted per their recommendations        VTE Risk Mitigation (From admission, onward)         Ordered     rivaroxaban tablet 15 mg  With dinner         10/20/23 0938     Reason for No Pharmacological VTE Prophylaxis  Once        Question:  Reasons:  Answer:  Already adequately anticoagulated on oral Anticoagulants    10/19/23 1750     IP VTE HIGH RISK PATIENT  Once         10/19/23 1750     Place sequential compression device  Until discontinued         10/19/23 1750                Discharge Planning   EBEN:      Code Status: Full Code   Is the patient medically ready for discharge?: No    Reason for patient still in  hospital (select all that apply): Patient trending condition, Treatment, Consult recommendations, PT / OT recommendations and Pending disposition  Discharge Plan A: Psychiatric hospital                  Debora Trinh MD  Department of Hospital Medicine   [facility] ([facility])

## 2023-10-22 NOTE — ASSESSMENT & PLAN NOTE
Patient with Long standing persistent (>12 months) atrial fibrillation which is uncontrolled currently with Beta Blocker and Amiodarone. Patient is currently in atrial fibrillation.[id number] Score: 1. HASBLED Score: . Anticoagulation indicated. Anticoagulation done with Xarelto     Continue outpatient amiodarone and metoprolol  Appreciate cardiology consult.      Discussing with Cardiology who recommended loading dose of due to at 0.25 mg q.6 hours x4 doses

## 2023-10-22 NOTE — PROGRESS NOTES
[facility] - Telemetry ([facility])  Cardiology  Progress Note    Patient Name: Alejandra Strong  MRN: [id number]  Admission Date: 10/19/2023  Hospital Length of Stay: 2 days  Code Status: Full Code   Attending Physician: Debora White MD   Primary Care Physician: Mer, Primary Doctor  Expected Discharge Date:   Principal Problem:Atrial fibrillation with rapid ventricular response    Subjective:     Hospital Course:   10/21/23  Patient seen and examined today. Leg swelling noted. Output noted as 2.6L, but is urinating in depends. Labs and chart reviewed. Creatinine 1.6 from 0.8. BUN 27 from 20. CO2 28 from 18.     10/22/23 Pt states she is feeling well. Denies CP or SOB. Creatine now stable at 1.4. BUN now stable at 20.          Review of Systems   Constitutional: Negative for diaphoresis, malaise/fatigue, weight gain and weight loss.   HENT:  Negative for congestion and nosebleeds.    Cardiovascular:  Positive for leg swelling. Negative for chest pain, claudication, cyanosis, dyspnea on exertion, irregular heartbeat, near-syncope, orthopnea, palpitations, paroxysmal nocturnal dyspnea and syncope.   Respiratory:  Negative for cough, hemoptysis, shortness of breath, sleep disturbances due to breathing, snoring, sputum production and wheezing.    Hematologic/Lymphatic: Negative for bleeding problem. Does not bruise/bleed easily.   Skin:  Rash: chest.   Musculoskeletal:  Negative for arthritis, back pain, falls, joint pain, muscle cramps and muscle weakness.   Gastrointestinal:  Negative for abdominal pain, constipation, diarrhea, heartburn, hematemesis, hematochezia, melena, nausea and vomiting.   Genitourinary:  Negative for dysuria, hematuria and nocturia.   Neurological:  Negative for excessive daytime sleepiness, dizziness, headaches, light-headedness, loss of balance, numbness, vertigo and weakness.     Objective:     Vital Signs (Most Recent):  Temp: 98.5 °F (36.9 °C) (10/21/23 1547)  Pulse: (!) 118 (10/21/23  1547)  Resp: 18 (10/21/23 1547)  BP: 123/82 (10/21/23 1547)  SpO2: 96 % (10/21/23 1547) Vital Signs (24h Range):  Temp:  [98 °F (36.7 °C)-98.8 °F (37.1 °C)] 98.5 °F (36.9 °C)  Pulse:  [107-147] 118  Resp:  [16-20] 18  SpO2:  [92 %-96 %] 96 %  BP: (108-124)/(66-93) 123/82     Weight: 74.5 kg (164 lb 3.9 oz)  Body mass index is 29.09 kg/m².     SpO2: 96 %         Intake/Output Summary (Last 24 hours) at 10/21/2023 1654  Last data filed at 10/21/2023 0924  Gross per 24 hour   Intake 200 ml   Output 250 ml   Net -50 ml       Lines/Drains/Airways       Peripheral Intravenous Line  Duration                  Peripheral IV - Single Lumen 10/21/23 0130 20 G Distal;Left;Posterior Forearm <1 day         Peripheral IV - Single Lumen 10/21/23 0130 20 G Left;Posterior Forearm <1 day                       Physical Exam  Vitals and nursing note reviewed.   Constitutional:       Appearance: Normal appearance. She is well-developed.   HENT:      Head: Normocephalic.      Mouth/Throat:      Mouth: Mucous membranes are moist.   Neck:      Vascular: No carotid bruit or JVD.   Cardiovascular:      Rate and Rhythm: Tachycardia present. Rhythm irregular.      Pulses: Normal pulses.      Heart sounds: Normal heart sounds. No murmur heard.     No friction rub.   Pulmonary:      Effort: Pulmonary effort is normal. No respiratory distress.      Breath sounds: Normal breath sounds. No wheezing or rales.   Abdominal:      General: Bowel sounds are normal. There is no distension.      Palpations: Abdomen is soft.      Tenderness: There is no abdominal tenderness. There is no guarding.   Musculoskeletal:         General: No swelling or tenderness.      Cervical back: Neck supple. No tenderness.      Right lower leg: Edema present.      Left lower leg: Edema present.   Skin:     General: Skin is warm and dry.      Capillary Refill: Capillary refill takes less than 2 seconds.      Findings: Rash (chest) present.   Neurological:      General: No  "focal deficit present.      Mental Status: She is alert and oriented to person, place, and time.   Psychiatric:         Mood and Affect: Mood normal.         Behavior: Behavior normal.         Thought Content: Thought content normal.            Significant Labs: BMP:   Recent Labs   Lab 10/20/23  0529 10/21/23  0500   * 148*    138   K 4.4 4.6    98   CO2 27 28   BUN 28* 27*   CREATININE 1.4 1.6*   CALCIUM 8.6* 8.2*   MG 1.7 2.6   , CMP   Recent Labs   Lab 10/20/23  0529 10/21/23  0500    138   K 4.4 4.6    98   CO2 27 28   * 148*   BUN 28* 27*   CREATININE 1.4 1.6*   CALCIUM 8.6* 8.2*   PROT 6.3 6.9   ALBUMIN 2.6* 2.8*   BILITOT 0.5 0.4   ALKPHOS 50* 63   AST 30 38   ALT 18 23   ANIONGAP 9 12   , CBC   Recent Labs   Lab 10/20/23  0529 10/21/23  0500   WBC 4.22 5.89   HGB 7.8* 7.6*   HCT 27.8* 27.2*   * 154   , INR No results for input(s): "INR", "PROTIME" in the last 48 hours., and Lipid Panel No results for input(s): "CHOL", "HDL", "LDLCALC", "TRIG", "CHOLHDL" in the last 48 hours.    Significant Imaging: Echocardiogram: 2D echo with color flow doppler: No results found for this or any previous visit. and Transthoracic echo (TTE) complete (Cupid Only):   Results for orders placed or performed during the hospital encounter of 10/08/23   Echo   Result Value Ref Range    BSA 1.73 m2    LVOT stroke volume 34.90 cm3    LVIDd 4.34 3.5 - 6.0 cm    LV Systolic Volume 53.86 mL    LV Systolic Volume Index 31.5 mL/m2    LVIDs 3.58 2.1 - 4.0 cm    LV Diastolic Volume 84.71 mL    LV Diastolic Volume Index 49.54 mL/m2    IVS 1.48 (A) 0.6 - 1.1 cm    LVOT diameter 2.13 cm    LVOT area 3.6 cm2    FS 18 (A) 28 - 44 %    Left Ventricle Relative Wall Thickness 0.52 cm    Posterior Wall 1.13 (A) 0.6 - 1.1 cm    LV mass 211.89 g    LV Mass Index 124 g/m2    MV Peak E Kaden 0.88 m/s    TDI LATERAL 0.09 m/s    TDI SEPTAL 0.06 m/s    E/E' ratio 11.73 m/s    MV Peak A Kaden 0.51 m/s    TR Max Kaden " 2.99 m/s    E/A ratio 1.73     E wave deceleration time 58.22 msec    LV SEPTAL E/E' RATIO 14.67 m/s    LV LATERAL E/E' RATIO 9.78 m/s    LVOT peak carlos 0.65 m/s    Left Ventricular Outflow Tract Mean Velocity 0.45 cm/s    Left Ventricular Outflow Tract Mean Gradient 0.95 mmHg    LA size 4.75 cm    RVDD 3.11 cm    AV mean gradient 3 mmHg    AV peak gradient 5 mmHg    Ao peak carlos 1.15 m/s    Ao VTI 15.30 cm    LVOT peak VTI 9.80 cm    AV valve area 2.28 cm²    AV Velocity Ratio 0.57     AV index (prosthetic) 0.64     SIDNEY by Velocity Ratio 2.01 cm²    MV mean gradient 2 mmHg    MV peak gradient 5 mmHg    MV stenosis pressure 1/2 time 16.88 ms    MV valve area p 1/2 method 13.03 cm2    MV valve area by continuity eq 2.08 cm2    MV VTI 16.8 cm    Triscuspid Valve Regurgitation Peak Gradient 36 mmHg    Mean e' 0.08 m/s    ZLVIDS 1.56     ZLVIDD -0.90     TV resting pulmonary artery pressure 51 mmHg    RV TB RVSP 18 mmHg    Est. RA pres 15 mmHg    Narrative      Rhythm is atrial flutter.    Left Ventricle: The left ventricle is normal in size. Normal wall   thickness. There is moderately reduced systolic function with a visually   estimated ejection fraction of 35 - 40%.    Left Atrium: Left atrium is moderately dilated.    Right Ventricle: Mild right ventricular enlargement. Systolic function   is mildly reduced.    Right Atrium: Right atrium is moderately dilated.    Aortic Valve: The aortic valve is a trileaflet valve. There is mild   aortic valve sclerosis.    Mitral Valve: There is mild to moderate regurgitation.    Tricuspid Valve: There is moderate regurgitation.    Pulmonic Valve: There is mild regurgitation.    Pulmonary Artery: The estimated pulmonary artery systolic pressure is   51 mmHg.    IVC/SVC: Elevated venous pressure at 15 mmHg.    Pericardium: There is a trivial effusion.       EKG: Atrial fibrillation with a competing junctional pacemaker   Septal infarct ,age undetermined   T wave  abnormality, consider anterior ischemia       Assessment and Plan:         * Atrial fibrillation with rapid ventricular response  Tele reviewed, afib HR 140s  Cont xarelto  Start Amio gtt, can DC PO Amio    10/21/23  Continue on Amio infusion  Became hypotensive today, metoprolol reduced to 12.5 q6h  Add IV digoxin 250 mcg x3 doses q6h - will have to monitor d/t renal function being mildly worsened  Check digoxin level in the morning    10/22/23  HR levels now controlled  Continue OAC  Digoxin level in the AM  Most ;likely will start digoxen 125mcg daily, renal function improved, but most likely  has possible CKD  Will start PO aniodorone titration, 400mg BID x 7 days, 400 daily x 7 days then 200 daily  Stop amio infusion    Acute on chronic combined systolic and diastolic congestive heart failure    Echo with EF 35-40%  Cont OMT metoprolol, IV diuresis, ACEi as BP tolerates  Strict I/Os  Low Na diet    10/21/23  Renal function mildly worsened  Lasix stopped  Has put out 2.6 L  Check lactate and BNP as she still has significant lower extremity swelling    10/22/23  Renal function improving  Will start torsemide PO, breathing well on room air     Essential hypertension  stable    Dyslipidemia  statin        VTE Risk Mitigation (From admission, onward)         Ordered     rivaroxaban tablet 15 mg  With dinner         10/20/23 0938     Reason for No Pharmacological VTE Prophylaxis  Once        Question:  Reasons:  Answer:  Already adequately anticoagulated on oral Anticoagulants    10/19/23 1750     IP VTE HIGH RISK PATIENT  Once         10/19/23 1750     Place sequential compression device  Until discontinued         10/19/23 1750                Norman Hernandes MD  Cardiology  [facility] - Telemetry ([facility])

## 2023-10-23 LAB
ALBUMIN SERPL BCP-MCNC: 2.8 G/DL (ref 3.5–5.2)
ANION GAP SERPL CALC-SCNC: 11 MMOL/L (ref 8–16)
BNP SERPL-MCNC: 1830 PG/ML (ref 0–99)
BUN SERPL-MCNC: 15 MG/DL (ref 8–23)
CALCIUM SERPL-MCNC: 8.6 MG/DL (ref 8.7–10.5)
CHLORIDE SERPL-SCNC: 101 MMOL/L (ref 95–110)
CO2 SERPL-SCNC: 28 MMOL/L (ref 23–29)
CREAT SERPL-MCNC: 1.4 MG/DL (ref 0.5–1.4)
DIGOXIN SERPL-MCNC: 1.7 NG/ML (ref 0.8–2)
EST. GFR  (NO RACE VARIABLE): 42 ML/MIN/1.73 M^2
GLUCOSE SERPL-MCNC: 108 MG/DL (ref 70–110)
PHOSPHATE SERPL-MCNC: 3.8 MG/DL (ref 2.7–4.5)
POTASSIUM SERPL-SCNC: 4.4 MMOL/L (ref 3.5–5.1)
SODIUM SERPL-SCNC: 140 MMOL/L (ref 136–145)

## 2023-10-23 PROCEDURE — 21400001 HC TELEMETRY ROOM

## 2023-10-23 PROCEDURE — 80162 ASSAY OF DIGOXIN TOTAL: CPT | Performed by: STUDENT IN AN ORGANIZED HEALTH CARE EDUCATION/TRAINING PROGRAM

## 2023-10-23 PROCEDURE — 99233 SBSQ HOSP IP/OBS HIGH 50: CPT | Mod: 95,AF,HB, | Performed by: PSYCHIATRY & NEUROLOGY

## 2023-10-23 PROCEDURE — 99233 PR SUBSEQUENT HOSPITAL CARE,LEVL III: ICD-10-PCS | Mod: 95,AF,HB, | Performed by: PSYCHIATRY & NEUROLOGY

## 2023-10-23 PROCEDURE — 93005 ELECTROCARDIOGRAM TRACING: CPT

## 2023-10-23 PROCEDURE — 25000003 PHARM REV CODE 250: Performed by: STUDENT IN AN ORGANIZED HEALTH CARE EDUCATION/TRAINING PROGRAM

## 2023-10-23 PROCEDURE — 90833 PR PSYCHOTHERAPY W/PATIENT W/E&M, 30 MIN (ADD ON): ICD-10-PCS | Mod: AF,HB,95, | Performed by: PSYCHIATRY & NEUROLOGY

## 2023-10-23 PROCEDURE — 93010 ELECTROCARDIOGRAM REPORT: CPT | Mod: ,,, | Performed by: INTERNAL MEDICINE

## 2023-10-23 PROCEDURE — 97116 GAIT TRAINING THERAPY: CPT | Mod: CQ

## 2023-10-23 PROCEDURE — 36415 COLL VENOUS BLD VENIPUNCTURE: CPT | Performed by: STUDENT IN AN ORGANIZED HEALTH CARE EDUCATION/TRAINING PROGRAM

## 2023-10-23 PROCEDURE — 25000003 PHARM REV CODE 250: Performed by: INTERNAL MEDICINE

## 2023-10-23 PROCEDURE — 83880 ASSAY OF NATRIURETIC PEPTIDE: CPT | Performed by: INTERNAL MEDICINE

## 2023-10-23 PROCEDURE — 63600175 PHARM REV CODE 636 W HCPCS: Performed by: INTERNAL MEDICINE

## 2023-10-23 PROCEDURE — 80069 RENAL FUNCTION PANEL: CPT | Performed by: INTERNAL MEDICINE

## 2023-10-23 PROCEDURE — 97530 THERAPEUTIC ACTIVITIES: CPT | Mod: CQ

## 2023-10-23 PROCEDURE — 93010 EKG 12-LEAD: ICD-10-PCS | Mod: ,,, | Performed by: INTERNAL MEDICINE

## 2023-10-23 PROCEDURE — 90833 PSYTX W PT W E/M 30 MIN: CPT | Mod: AF,HB,95, | Performed by: PSYCHIATRY & NEUROLOGY

## 2023-10-23 RX ORDER — DIVALPROEX SODIUM 500 MG/1
500 TABLET, DELAYED RELEASE ORAL NIGHTLY
Status: DISCONTINUED | OUTPATIENT
Start: 2023-10-23 | End: 2023-10-31 | Stop reason: HOSPADM

## 2023-10-23 RX ORDER — DIVALPROEX SODIUM 250 MG/1
250 TABLET, DELAYED RELEASE ORAL DAILY
Status: DISCONTINUED | OUTPATIENT
Start: 2023-10-24 | End: 2023-10-31 | Stop reason: HOSPADM

## 2023-10-23 RX ORDER — METOLAZONE 5 MG/1
5 TABLET ORAL ONCE
Status: COMPLETED | OUTPATIENT
Start: 2023-10-23 | End: 2023-10-23

## 2023-10-23 RX ORDER — TORSEMIDE 10 MG/1
40 TABLET ORAL 2 TIMES DAILY
Status: DISCONTINUED | OUTPATIENT
Start: 2023-10-23 | End: 2023-10-25

## 2023-10-23 RX ADMIN — DIGOXIN 0.12 MG: 125 TABLET ORAL at 09:10

## 2023-10-23 RX ADMIN — DIVALPROEX SODIUM 500 MG: 500 TABLET, DELAYED RELEASE ORAL at 08:10

## 2023-10-23 RX ADMIN — TOBRAMYCIN AND DEXAMETHASONE 1 DROP: 3; 1 SUSPENSION/ DROPS OPHTHALMIC at 05:10

## 2023-10-23 RX ADMIN — SENNOSIDES AND DOCUSATE SODIUM 1 TABLET: 8.6; 5 TABLET ORAL at 09:10

## 2023-10-23 RX ADMIN — SERTRALINE HYDROCHLORIDE 50 MG: 50 TABLET ORAL at 09:10

## 2023-10-23 RX ADMIN — ATORVASTATIN CALCIUM 20 MG: 10 TABLET, FILM COATED ORAL at 09:10

## 2023-10-23 RX ADMIN — POLYETHYLENE GLYCOL 3350 17 G: 17 POWDER, FOR SOLUTION ORAL at 09:10

## 2023-10-23 RX ADMIN — SENNOSIDES AND DOCUSATE SODIUM 1 TABLET: 8.6; 5 TABLET ORAL at 08:10

## 2023-10-23 RX ADMIN — IRON SUCROSE 200 MG: 20 INJECTION, SOLUTION INTRAVENOUS at 09:10

## 2023-10-23 RX ADMIN — METOPROLOL SUCCINATE 50 MG: 50 TABLET, EXTENDED RELEASE ORAL at 09:10

## 2023-10-23 RX ADMIN — TOBRAMYCIN AND DEXAMETHASONE 1 DROP: 3; 1 SUSPENSION/ DROPS OPHTHALMIC at 10:10

## 2023-10-23 RX ADMIN — LEVOTHYROXINE SODIUM 50 MCG: 50 TABLET ORAL at 05:10

## 2023-10-23 RX ADMIN — RIVAROXABAN 15 MG: 15 TABLET, FILM COATED ORAL at 05:10

## 2023-10-23 RX ADMIN — AMIODARONE HYDROCHLORIDE 400 MG: 200 TABLET ORAL at 09:10

## 2023-10-23 RX ADMIN — DIVALPROEX SODIUM 250 MG: 250 TABLET, DELAYED RELEASE ORAL at 09:10

## 2023-10-23 RX ADMIN — MELATONIN TAB 3 MG 3 MG: 3 TAB at 08:10

## 2023-10-23 RX ADMIN — TORSEMIDE 40 MG: 10 TABLET ORAL at 08:10

## 2023-10-23 RX ADMIN — TOBRAMYCIN AND DEXAMETHASONE 1 DROP: 3; 1 SUSPENSION/ DROPS OPHTHALMIC at 02:10

## 2023-10-23 RX ADMIN — TORSEMIDE 40 MG: 10 TABLET ORAL at 05:10

## 2023-10-23 RX ADMIN — METOLAZONE 5 MG: 5 TABLET ORAL at 09:10

## 2023-10-23 RX ADMIN — TRAZODONE HYDROCHLORIDE 100 MG: 100 TABLET ORAL at 08:10

## 2023-10-23 RX ADMIN — AMIODARONE HYDROCHLORIDE 400 MG: 200 TABLET ORAL at 08:10

## 2023-10-23 RX ADMIN — TOBRAMYCIN AND DEXAMETHASONE 1 DROP: 3; 1 SUSPENSION/ DROPS OPHTHALMIC at 09:10

## 2023-10-23 NOTE — CONSULTS
[facility]  Psychiatry  Telepsychiatry Consult Note        Patient agreeable to consultation via telepsychiatry.    Tele-Consultation from Psychiatry started: 10/23/2023 at 2:31 PM  The chief complaint leading to psychiatric consultation is: Please evaluate for psychiatric stability for SNF disposition   This consultation was requested by Debora White MD, the primary team attending physician.  The location of the consulting psychiatrist is [place], [place].  The patient location is  [facility] TELEMETRY   Also present with the patient at the time of the consultation: nurse / tech     Patient Identification:   Alejandra Strong is a 63 y.o. female.    Patient information was obtained from patient, past medical records, and primary team.    Inpatient consult to Telemedicine - Psych  Consult performed by: Jose E Cartwright MD  Consult ordered by: Debora White MD        Consult Start Time: 10/23/2023 14:31 CDT  Consult End Time: 10/23/2023 15:36 CDT      HISTORY    Per Initial History from Primary Team:  Patient presents with    Leg Swelling       Pt has swelling to legs and abdomen, also sob, denies chest pain. Hx of CHF and A-fib. Pt [facility] under a PEC/CEC   Patient is a 63-year-old female who has  has a past medical history of Anticoagulant long-term use, Bipolar disorder unspecified, CHF (congestive heart failure) reduced ejection fraction, CKD 3 (chronic kidney disease), Diabetes mellitus type 2, Hypertension, Hypothyroidism, unspecified, and Paroxysmal atrial fibrillation.   Patient initially presented to [facility] with complaints of visual and auditory hallucinations.  She was noted to be in AFib/flutter with RVR.  She was admitted to the hospital under pec her rate was controlled with amiodarone, Toprol-XL 50 mg a day and she was discharged to [facility] for further treatment of her bipolar disorder.  She she was discharged 10/11/[facility].  She  is been seen by PCP and Psychiatry there until today when she was noted to have significant swelling in her legs and feet and sent under a pec/CEC for evaluation.  In the emergency department she was noted to be in AFib/flutter rate of 114.  She was also noted to have potassium of 2.7, calcium of 5.4 corrected to 7.2 and a BNP at 9:12 a.m. which is down from 1993 on October 10th.  She was given IV Lasix calcium and potassium in the emergency department.  Her initial troponin was negative.  Patient is seen lethargic but arousable.  Knows she is in the hospital, the date, as well as her birth date.  She denies any chest pain or shortness a breath.  She is admitted under a pec/CEC will need a sitter and will consult psych for help with her meds in a.m.  Overview/Hospital Course from Primary Team:  Patient admitted cardiology and Psychiatry were consulted.  Patient was begun on amiodarone drip for control for AFib with RVR.  Meds were adjusted per Psychiatry.  Patient being seen by Cardiology.  She was started on amnio drip to attempted controlled rate.  Overnight her heart rate remained high and her blood pressure this morning was low cardiology will be evaluating her soon.  Seen by cardiology and low blood pressure was started on digoxin load for control of heart rate.  Reviewing her records seems that patient has had CKD for several years and likely we will need continued diuresis.  Interval History from Primary Team on 10/22/2023:    Patient seen and examined at bedside.  Sitter in room.  Patient is weak and asking to go home.  She reports home is a women's homeless shelter.      Chief Complaint / Reason for Psychiatry Consult: Please evaluate for psychiatric stability for SNF disposition       Subjective / Interval Psychiatric History Today (10/23/2023) (with Psychiatric ROS below):  Alejandra Strong is a 63 y.o. female with a past medical history as noted above/below and a past psychiatric history of bipolar  "affective disorder, anxiety, insomnia, and possible dementia, currently being treated by her inpatient primary team for a principle problem of atrial fibrillation with rvr.  Psychiatry was originally consulted as noted above.  The patient was seen and examined.  The chart was reviewed.  On examination today, the patient was alert and oriented to person, place, city, state, month, and situation.  She was disoriented to year ("2020").  She was CAM-ICU negative for delirium.  She endorses an improving mood, anxiety, and sleep with the current regimen of Depakote, Zoloft, and Trazodone, but she continues to endorses some intermittent low mood and anxiety with emotional lability (see detailed psych ROS below).  She endorses sleeping about 7-8 hours last night and endorses a good appetite.  She denies any current or recent active or passive SI / HI.  She denies any current or recent AH, VH, TH, delusions, paranoia, or lenny s/s.  She denies any adverse effects to her current medication regimen.  Regarding current medical/physical complaints, she endorses improving fatigue.  She denies any other medical complaints at this time.  NAD was observed during the examination.  Psychotherapy was implemented as noted below with a focus on improving orientation, depression / mood, and anxiety.  See detailed psych ROS below.  See A/P below.  After discussing the risks, benefits, alt vs no treatment, she is agreeable and desiring to go to a SNF to get physically stronger prior to going back to her women's shelter where she has a caregiver.        Psychiatric Review Of Systems - Currently, the patient is endorsing and/or denying the following:  (patient's endorsements are BOLDED below; if not BOLDED, then patient denied):    Endorses intermittent Symptoms of Depression (improving): diminished mood, low motivation, loss of interest/anhedonia, irritability, diminished energy, change in sleep, change in appetite, diminished concentration " or cognition or indecisiveness, PMA/R, excessive guilt or hopelessness or worthlessness, suicidal ideations    Denies issues with Sleep: initiation, maintenance, early morning awakening with inability to return to sleep    Denies Suicidal/Homicidal ideations: active/passive ideations, organized plans, future intentions    Denies Symptoms of psychosis: hallucinations, delusions, disorganized thinking, disorganized behavior or abnormal motor behavior, or negative symptoms (diminshed emotional expression, avolition, anhedonia, alogia, asociality)     Denies Symptoms of lenny or hypomania: elevated, expansive, or irritable mood with increased energy or activity; with inflated self-esteem or grandiosity, decreased need for sleep, increased rate of speech, FOI or racing thoughts, distractibility, increased goal directed activity or PMA, risky/disinhibited behavior    Endorses intermittent Symptoms of Anxiety (improving): excessive anxiety/worry/fear, more days than not, about numerous issues, difficult to control, with restlessness, fatigue, poor concentration, irritability, muscle tension, sleep disturbance; causes functionally impairing distress     Denies Symptoms of Panic Disorder: recurrent panic attacks, precipitated or un-precipitated, source of worry and/or behavioral changes secondary; with or without agoraphobia    Denies Symptoms of PTSD: h/o trauma; re-experiencing/intrusive symptoms, avoidant behavior, negative alterations in cognition or mood, or hyperarousal symptoms; with or without dissociative symptoms     Denies Symptoms of OCD: obsessions or compulsions     Denies Symptoms of Eating Disorders: anorexia, bulimia or binging    Denies Substance Use: intoxication, withdrawal, tolerance, used in larger amounts or duration than intended, unsuccessful attempts to limit or quit, increased time engaging in or seeking out, cravings or strong desire to use, failure to fulfill obligations, negative consequences in  social/interpersonal/occupational,/recreational areas, use in dangerous situations, medical or psychological consequences       [facility] Toolkit ASQ Suicide Screening Tool:  In the past few weeks, have you wished you were dead? Denies   In the past few weeks, have you felt that you or your family would be better off if you were dead? Denies  In the past week, have you been having thoughts about killing yourself? Denies  Have you ever tried to kill yourself? Denies  Are you having thoughts of killing yourself right now? Denies       PSYCHOTHERAPY ADD-ON +[id number]   30 (16-37*) minutes    Time: 20 minutes  Participants: Met with patient    Therapeutic Intervention Type: behavior modifying psychotherapy, supportive psychotherapy  Why chosen therapy is appropriate versus another modality: relevant to diagnosis, patient responds to this modality, evidence based practice    Target symptoms: disorientation, depression / mood, and anxiety   Primary focus: improving orientation, depression / mood, and anxiety   Psychotherapeutic techniques: supportive and psychodynamic techniques; psycho-education; deep breathing exercises; re-orientation; CBT; problem solving techniques and managing life stressors    Outcome monitoring methods: self-report, observation    Patient's response to intervention:  The patient's response to intervention is accepting / improving.     Progress toward goals:  The patient's progress toward goals is fair / improving.       ROS:  General ROS: negative for - chills, fever or night sweats; positive for improving fatigue  Ophthalmic ROS: negative for - blurry vision, double vision or eye pain  ENT ROS: negative for - sinus pain, headaches, sore throat or visual changes  Allergy and Immunology ROS: negative for - hives, itchy/watery eyes or nasal congestion  Hematological and Lymphatic ROS: negative for - bleeding problems, bruising, jaundice or pallor  Endocrine ROS: negative for - galactorrhea, hot flashes, mood  swings, palpitations or temperature intolerance  Respiratory ROS: negative for - cough, hemoptysis, shortness of breath, tachypnea or wheezing  Cardiovascular ROS: negative for - chest pain, dyspnea on exertion, loss of consciousness, palpitations, rapid heart rate or shortness of breath  Gastrointestinal ROS: negative for - appetite loss, nausea, abdominal pain, blood in stools, change in bowel habits, constipation or diarrhea  Genito-Urinary ROS: negative for - incontinence, nocturia or pelvic pain  Musculoskeletal ROS: negative for - joint stiffness, joint swelling, joint pain or muscle pain   Neurological ROS: negative for - behavioral changes, confusion, dizziness, memory loss, numbness/tingling or seizures  Dermatological ROS: negative for dry skin, hair changes, pruritus or rash  Psychiatric ROS: see detailed psychiatric ROS above in subjective section       PAST MEDICAL & SURGICAL HISTORY   Past Medical History:   Diagnosis Date    Anticoagulant long-term use     Bipolar disorder, unspecified     Bipolar disorder, unspecified     CHF (congestive heart failure)     CKD (chronic kidney disease)     Diabetes mellitus     Hypertension     Hypothyroidism, unspecified     Paroxysmal atrial fibrillation      History reviewed. No pertinent surgical history.    FAMILY HISTORY   Family History   Problem Relation Age of Onset    Hypertension Mother        ALLERGIES   Review of patient's allergies indicates:   Allergen Reactions    Pcn [penicillins] Itching       CURRENT MEDICATION REGIMEN   Home Meds:   Prior to Admission medications    Medication Sig Start Date End Date Taking? Authorizing Provider   metoprolol succinate (TOPROL-XL) 50 MG 24 hr tablet Take 50 mg by mouth. 10/4/23  Yes Provider, Historical   amiodarone (PACERONE) 200 MG Tab Take 1 tablet (200 mg total) by mouth once daily. 10/11/23 10/10/24  Dominguez Mandujano MD   amiodarone (PACERONE) 200 MG Tab Take 1 tablet (200 mg total) by mouth once daily. 10/11/23    Dominguez Mandujano MD   atorvastatin (LIPITOR) 20 MG tablet Take 1 tablet (20 mg total) by mouth once daily. 10/11/23   Dominguez Mandujano MD   benazepriL (LOTENSIN) 5 MG tablet Take 2 tablets (10 mg total) by mouth once daily. 10/11/23   Dominguez Mandujano MD   diltiaZEM (CARDIZEM CD) 180 MG 24 hr capsule Take 1 capsule (180 mg total) by mouth 2 (two) times daily. 10/11/23   Dominguez Mandujano MD   diltiaZEM (CARDIZEM CD) 180 MG 24 hr capsule Take 180 mg by mouth 2 (two) times a day.    Provider, Historical   divalproex (DEPAKOTE) 250 MG EC tablet Take 1 tablet (250 mg total) by mouth every 12 (twelve) hours. 10/11/23   Dominguez Mandujano MD   EScitalopram oxalate (LEXAPRO) 10 MG tablet Take 1 tablet (10 mg total) by mouth once daily. 10/11/23   Dominguez Mandujano MD   furosemide (LASIX) 20 MG tablet Take 1 tablet (20 mg total) by mouth every morning. 10/11/23   Dominguez Mandujano MD   isosorbide mononitrate (IMDUR) 30 MG 24 hr tablet Take 1 tablet (30 mg total) by mouth once daily. 10/11/23   Dominguez Mandujano MD   levothyroxine (SYNTHROID) 25 MCG tablet Take 1 tablet (25 mcg total) by mouth once daily. 10/11/23   Dominguez Mandujano MD   metoprolol succinate (TOPROL-XL) 50 MG 24 hr tablet Take 1 tablet (50 mg total) by mouth once daily. 10/11/23   Dominguez Mandujano MD   rivaroxaban (XARELTO) 20 mg Tab Take 1 tablet (20 mg total) by mouth daily with dinner or evening meal. 10/11/23   Dominguez Mandujano MD   sertraline (ZOLOFT) 100 MG tablet Take 1 tablet (100 mg total) by mouth once daily. 10/11/23   Dominguez Mandujano MD   traZODone (DESYREL) 100 MG tablet Take 1 tablet (100 mg total) by mouth every evening. 10/11/23   Dominguez Mandujano MD       OTC Meds: denies     Scheduled Meds:    [START ON 11/5/2023] amiodarone  200 mg Oral Daily    amiodarone  400 mg Oral BID    [START ON 10/29/2023] amiodarone  400 mg Oral Daily    atorvastatin  20 mg Oral Daily    digoxin  0.125 mg Oral Daily    divalproex  250 mg Oral Q12H    levothyroxine  50 mcg Oral Before breakfast     melatonin  3 mg Oral Nightly    metoprolol succinate  50 mg Oral Daily    polyethylene glycol  17 g Oral Daily    rivaroxaban  15 mg Oral Daily with dinner    senna-docusate 8.6-50 mg  1 tablet Oral BID    sertraline  50 mg Oral Daily    tobramycin-dexAMETHasone 0.3-0.1%  1 drop Left Eye Q4H While awake    torsemide  40 mg Oral BID loop    traZODone  100 mg Oral QHS      PRN Meds: acetaminophen, acetaminophen, dextrose 10%, dextrose 10%, glucagon (human recombinant), glucose, glucose, hydrALAZINE, naloxone, nitroGLYCERIN, OLANZapine, ondansetron, simethicone, sodium chloride 0.9%   Psychotherapeutics (From admission, onward)      Start     Stop Route Frequency Ordered    10/21/23 0900  sertraline tablet 50 mg         -- Oral Daily 10/20/23 1329    10/20/23 1428  OLANZapine tablet 2.5 mg         -- Oral Every 8 hours PRN 10/20/23 1329    10/19/23 2100  traZODone tablet 100 mg         -- Oral Nightly 10/19/23 1756            LABORATORY DATA   Recent Results (from the past 72 hour(s))   Comprehensive Metabolic Panel (CMP)    Collection Time: 10/21/23  5:00 AM   Result Value Ref Range    Sodium 138 136 - 145 mmol/L    Potassium 4.6 3.5 - 5.1 mmol/L    Chloride 98 95 - 110 mmol/L    CO2 28 23 - 29 mmol/L    Glucose 148 (H) 70 - 110 mg/dL    BUN 27 (H) 8 - 23 mg/dL    Creatinine 1.6 (H) 0.5 - 1.4 mg/dL    Calcium 8.2 (L) 8.7 - 10.5 mg/dL    Total Protein 6.9 6.0 - 8.4 g/dL    Albumin 2.8 (L) 3.5 - 5.2 g/dL    Total Bilirubin 0.4 0.1 - 1.0 mg/dL    Alkaline Phosphatase 63 55 - 135 U/L    AST 38 10 - 40 U/L    ALT 23 10 - 44 U/L    eGFR 36 (A) >60 mL/min/1.73 m^2    Anion Gap 12 8 - 16 mmol/L   Magnesium    Collection Time: 10/21/23  5:00 AM   Result Value Ref Range    Magnesium 2.6 1.6 - 2.6 mg/dL   Phosphorus    Collection Time: 10/21/23  5:00 AM   Result Value Ref Range    Phosphorus 3.7 2.7 - 4.5 mg/dL   CBC with Automated Differential    Collection Time: 10/21/23  5:00 AM   Result Value Ref Range    WBC 5.89 3.90 -  12.70 K/uL    RBC 3.33 (L) 4.00 - 5.40 M/uL    Hemoglobin 7.6 (L) 12.0 - 16.0 g/dL    Hematocrit 27.2 (L) 37.0 - 48.5 %    MCV 82 82 - 98 fL    MCH 22.8 (L) 27.0 - 31.0 pg    MCHC 27.9 (L) 32.0 - 36.0 g/dL    RDW 17.8 (H) 11.5 - 14.5 %    Platelets 154 150 - 450 K/uL    MPV 12.7 9.2 - 12.9 fL    Immature Granulocytes 0.5 0.0 - 0.5 %    Gran # (ANC) 3.9 1.8 - 7.7 K/uL    Immature Grans (Abs) 0.03 0.00 - 0.04 K/uL    Lymph # 1.0 1.0 - 4.8 K/uL    Mono # 0.8 0.3 - 1.0 K/uL    Eos # 0.1 0.0 - 0.5 K/uL    Baso # 0.06 0.00 - 0.20 K/uL    nRBC 0 0 /100 WBC    Gran % 66.1 38.0 - 73.0 %    Lymph % 17.1 (L) 18.0 - 48.0 %    Mono % 13.9 4.0 - 15.0 %    Eosinophil % 1.4 0.0 - 8.0 %    Basophil % 1.0 0.0 - 1.9 %    Differential Method Automated    Valproic Acid    Collection Time: 10/21/23  5:00 AM   Result Value Ref Range    Valproic Acid Level 34.9 (L) 50.0 - 100.0 ug/mL   Lactic acid, plasma    Collection Time: 10/21/23 12:31 PM   Result Value Ref Range    Lactate (Lactic Acid) 1.1 0.5 - 2.2 mmol/L   Brain natriuretic peptide    Collection Time: 10/21/23 12:31 PM   Result Value Ref Range    BNP 1,163 (H) 0 - 99 pg/mL   Comprehensive Metabolic Panel (CMP)    Collection Time: 10/22/23  4:52 AM   Result Value Ref Range    Sodium 142 136 - 145 mmol/L    Potassium 4.5 3.5 - 5.1 mmol/L    Chloride 105 95 - 110 mmol/L    CO2 27 23 - 29 mmol/L    Glucose 102 70 - 110 mg/dL    BUN 20 8 - 23 mg/dL    Creatinine 1.4 0.5 - 1.4 mg/dL    Calcium 8.4 (L) 8.7 - 10.5 mg/dL    Total Protein 6.9 6.0 - 8.4 g/dL    Albumin 2.8 (L) 3.5 - 5.2 g/dL    Total Bilirubin 0.4 0.1 - 1.0 mg/dL    Alkaline Phosphatase 66 55 - 135 U/L    AST 32 10 - 40 U/L    ALT 21 10 - 44 U/L    eGFR 42 (A) >60 mL/min/1.73 m^2    Anion Gap 10 8 - 16 mmol/L   Magnesium    Collection Time: 10/22/23  4:52 AM   Result Value Ref Range    Magnesium 2.2 1.6 - 2.6 mg/dL   Phosphorus    Collection Time: 10/22/23  4:52 AM   Result Value Ref Range    Phosphorus 4.0 2.7 - 4.5 mg/dL    CBC with Automated Differential    Collection Time: 10/22/23  4:52 AM   Result Value Ref Range    WBC 5.50 3.90 - 12.70 K/uL    RBC 3.43 (L) 4.00 - 5.40 M/uL    Hemoglobin 8.0 (L) 12.0 - 16.0 g/dL    Hematocrit 28.9 (L) 37.0 - 48.5 %    MCV 84 82 - 98 fL    MCH 23.3 (L) 27.0 - 31.0 pg    MCHC 27.7 (L) 32.0 - 36.0 g/dL    RDW 18.1 (H) 11.5 - 14.5 %    Platelets 152 150 - 450 K/uL    MPV 12.1 9.2 - 12.9 fL    Immature Granulocytes 1.6 (H) 0.0 - 0.5 %    Gran # (ANC) 3.8 1.8 - 7.7 K/uL    Immature Grans (Abs) 0.09 (H) 0.00 - 0.04 K/uL    Lymph # 0.8 (L) 1.0 - 4.8 K/uL    Mono # 0.7 0.3 - 1.0 K/uL    Eos # 0.1 0.0 - 0.5 K/uL    Baso # 0.05 0.00 - 0.20 K/uL    nRBC 0 0 /100 WBC    Gran % 68.8 38.0 - 73.0 %    Lymph % 15.1 (L) 18.0 - 48.0 %    Mono % 12.7 4.0 - 15.0 %    Eosinophil % 0.9 0.0 - 8.0 %    Basophil % 0.9 0.0 - 1.9 %    Differential Method Automated    Digoxin level    Collection Time: 10/23/23  5:31 AM   Result Value Ref Range    Digoxin Lvl 1.7 0.8 - 2.0 ng/mL   Brain natriuretic peptide    Collection Time: 10/23/23  5:31 AM   Result Value Ref Range    BNP 1,830 (H) 0 - 99 pg/mL   Renal Function Panel    Collection Time: 10/23/23  5:31 AM   Result Value Ref Range    Glucose 108 70 - 110 mg/dL    Sodium 140 136 - 145 mmol/L    Potassium 4.4 3.5 - 5.1 mmol/L    Chloride 101 95 - 110 mmol/L    CO2 28 23 - 29 mmol/L    BUN 15 8 - 23 mg/dL    Calcium 8.6 (L) 8.7 - 10.5 mg/dL    Creatinine 1.4 0.5 - 1.4 mg/dL    Albumin 2.8 (L) 3.5 - 5.2 g/dL    Phosphorus 3.8 2.7 - 4.5 mg/dL    eGFR 42 (A) >60 mL/min/1.73 m^2    Anion Gap 11 8 - 16 mmol/L      Lab Results   Component Value Date    VALPROATE 34.9 (L) 10/21/2023         EXAMINATION    VITALS   Vitals:    10/23/23 0329 10/23/23 0547 10/23/23 0758 10/23/23 1159   BP: 138/77  (!) 127/94 (!) 150/80   BP Location: Left arm  Left arm Right arm   Patient Position: Lying  Lying Lying   Pulse: 76  68 78   Resp: 18  18 19   Temp: 97.7 °F (36.5 °C)  97.8 °F (36.6 °C) 98 °F  "(36.7 °C)   TempSrc: Oral  Oral Oral   SpO2: (!) 94%  96% 95%   Weight:  71.1 kg (156 lb 12.8 oz)     Height:            CONSTITUTIONAL  General Appearance: NAD, unremarkable, age appropriate, lying in bed, overweight, calm     MUSCULOSKELETAL  Muscle Strength and Tone: WNL ; generalized fatigue / weakness observed    Abnormal Involuntary Movements: none observed   Gait and Station: Attempted but unable to assess due to medical acuity     PSYCHIATRIC   Behavior/Cooperation:  friendly and cooperative, eye contact normal, calm  Speech:  normal tone, normal rate, normal pitch, soft  Language: grossly intact, able to name, able to repeat with spontaneous speech  Mood:  "better"  Affect:  congruent with mood and mildly constricted   Associations: intact; no AUSTIN  Thought Process: Linear, more logical, and future-oriented   Thought Content: denies SI, HI, AH, VH, TH, delusions, or paranoia (no RIS observed)   Sensorium:  Awake  Alert and Oriented: to person, place, city, state, month, and situation ; disoriented to year   Memory: 3/3 immediate, 2/3 at 5 minutes    Recent: Limited / Intact; able to report intermittent recent events   Remote: Limited / Intact; Named 2/4 past presidents   Attention/concentration: Fair / Improving. Able to spell w-[id number]-[id number] but NOT d-l-r-[id number].   Similarities: Intact (difference between apple and orange?)  Abstract reasoning: Intact  Fund of Knowledge: Named 2/4 past presidents   Insight: Limited / Intact / Improving   Judgment: Limited / Intact / Improving     CAM ICU Delirium Assessment - NEGATIVE    Is the patient aware of the biomedical complications associated with substance abuse and mental illness? yes      MEDICAL DECISION MAKING    ASSESSMENT        Bipolar Affective Disorder (improving)  Unspecified Anxiety Disorder (improving)  Unspecified Insomnia (improving)  (Rule out Dementia)       RECOMMENDATIONS       - With reasonable medical certainty, based on a present-state examination, the " patient NO LONGER MEETS PEC/CEC criteria due to not being an imminent threat to self/others and not being gravely disabled 2/2 mental illness at this time.      - Continue Zoloft 50 mg PO daily for mood / anxiety, Trazodone 100 mg PO QHS for insomnia / mood, and increased Depakote to 250 mg PO QAM and 500 mg PO QHS for mood (will need a trough VPA level after 3-4 days on new dose) (discussed risks/benefits/alt vs no treatment with patient).     - After discussing the risks, benefits, alt vs no treatment, the patient is agreeable and desiring to go to a SNF to get physically stronger prior to going back to her women's shelter where she has a caregiver (please have CM/SW assist with this disposition).      - Psychotherapy was performed with patient as noted above with a focus on improving orientation, depression / mood, and anxiety.    - Patient's most recent resulted labs, imaging, and EKG were reviewed today.       - Please have CM/SW assist patient with ASAP outpatient mental health f/u for s/p discharge from this facility.      - Patient was instructed to call 911 and/or [phone] and return to the nearest ED if she begins feeling suicidal, homicidal, or gravely disabled (for s/p discharge from this facility).     - Thank you for this consult        Total time spent with patient face-to-face and/or managing/coordinating patient's care today (excluding the time spent on psychotherapy): 45 minutes   Time spent on psychotherapy today (as noted above): 20 minutes   Total time for encounter today including psychotherapy: 65 minutes      More than 50% of the time was spent counseling/coordinating care.     Consulting clinician was informed of the encounter and consult note.     Consultation ended: 10/23/2023 at 3:36 PM       STAFF:  Jose E Cartwright MD  [facility]   10/23/2023

## 2023-10-23 NOTE — ASSESSMENT & PLAN NOTE
Patient admitted from [facility]  She has a diagnosis of bipolar just is under [facility] is reportedly homeless and there prior to admission had auditory and visual hallucinations currently she was hyperactive with delusions and unable to understand her situation or take her medications.  She was started on Depakote  mg b.i.d. Zoloft 100 mg daily trazodone 100 mg q.h.s. was just recently started on Risperdal 1 mg p.o. b.i.d. for visual hallucinations.  At the time they were considering nursing home versus group home placement    Appreciate telepsych consult meds were adjusted per their recommendations    Seen by telepsych again today 10/23 with recommendations of medication adjustments reviewed.  No longer needed CEC

## 2023-10-23 NOTE — ASSESSMENT & PLAN NOTE
Patient with Long standing persistent (>12 months) atrial fibrillation which is uncontrolled currently with Beta Blocker and Amiodarone. Patient is currently in atrial fibrillation.[id number] Score: 1. HASBLED Score: . Anticoagulation indicated. Anticoagulation done with Xarelto     Continue outpatient amiodarone and metoprolol  Appreciate cardiology consult.      Discussing with Cardiology who recommended loading dose of due to at 0.25 mg q.6 hours x4 doses  Rate control.  Patient is remaining on amiodarone load and is on did 0.125 mg daily.

## 2023-10-23 NOTE — PLAN OF CARE
Problem: Skin Injury Risk Increased  Goal: Skin Health and Integrity  Outcome: Ongoing, Progressing  Intervention: Optimize Skin Protection  Flowsheets (Taken 10/23/2023 4791)  Pressure Reduction Techniques: frequent weight shift encouraged  Skin Protection: adhesive use limited  Head of Bed (HOB) Positioning: HOB elevated

## 2023-10-23 NOTE — SUBJECTIVE & OBJECTIVE
Interval History:  Patient seen and examined at bedside.  She reports she is feeling better.  No shortness of breath still with weakness and fatigue.  Patient to work with OT and PT today and telepsych consult for re-evaluation.  She is been participating in therapy and is anxious to get better to go home.    Review of Systems   Constitutional:  Positive for activity change, appetite change and fatigue. Negative for fever.   Respiratory:  Negative for chest tightness.    Cardiovascular:  Positive for leg swelling. Negative for chest pain.   Gastrointestinal:  Negative for nausea and vomiting.   Neurological:  Positive for weakness.   Psychiatric/Behavioral:  Negative for behavioral problems.    All other systems reviewed and are negative.    Objective:     Vital Signs (Most Recent):  Temp: 98 °F (36.7 °C) (10/23/23 1159)  Pulse: 78 (10/23/23 1159)  Resp: 19 (10/23/23 1159)  BP: (!) 150/80 (10/23/23 1747)  SpO2: 95 % (10/23/23 1159) Vital Signs (24h Range):  Temp:  [97.7 °F (36.5 °C)-98.1 °F (36.7 °C)] 98 °F (36.7 °C)  Pulse:  [68-85] 78  Resp:  [18-19] 19  SpO2:  [92 %-96 %] 95 %  BP: (127-184)/(76-94) 150/80     Weight: 71.1 kg (156 lb 12.8 oz)  Body mass index is 27.78 kg/m².    Intake/Output Summary (Last 24 hours) at 10/23/2023 1813  Last data filed at 10/23/2023 0628  Gross per 24 hour   Intake --   Output 4750 ml   Net -4750 ml         Physical Exam  Vitals reviewed.   Constitutional:       Appearance: Normal appearance.   HENT:      Head: Normocephalic and atraumatic.      Mouth/Throat:      Mouth: Mucous membranes are moist.      Pharynx: Oropharynx is clear.   Eyes:      Extraocular Movements: Extraocular movements intact.      Conjunctiva/sclera: Conjunctivae normal.   Cardiovascular:      Rate and Rhythm: Normal rate. Rhythm irregular.      Pulses: Normal pulses.      Heart sounds: Normal heart sounds.   Pulmonary:      Effort: Pulmonary effort is normal.      Breath sounds: Normal breath sounds. No  wheezing.   Abdominal:      General: Bowel sounds are normal.      Palpations: Abdomen is soft.   Musculoskeletal:         General: Normal range of motion.      Cervical back: Normal range of motion and neck supple.   Skin:     General: Skin is warm and dry.   Neurological:      General: No focal deficit present.      Mental Status: She is alert and oriented to person, place, and time. Mental status is at baseline.   Psychiatric:         Behavior: Behavior normal.             Significant Labs: All pertinent labs within the past 24 hours have been reviewed.  CBC:   Recent Labs   Lab 10/22/23  0452   WBC 5.50   HGB 8.0*   HCT 28.9*        CMP:   Recent Labs   Lab 10/22/23  0452 10/23/23  0531    140   K 4.5 4.4    101   CO2 27 28    108   BUN 20 15   CREATININE 1.4 1.4   CALCIUM 8.4* 8.6*   PROT 6.9  --    ALBUMIN 2.8* 2.8*   BILITOT 0.4  --    ALKPHOS 66  --    AST 32  --    ALT 21  --    ANIONGAP 10 11     Cardiac Markers:   Recent Labs   Lab 10/23/23  0531   BNP 1,830*     Magnesium:   Recent Labs   Lab 10/22/23  0452   MG 2.2       Significant Imaging: I have reviewed all pertinent imaging results/findings within the past 24 hours.

## 2023-10-23 NOTE — PLAN OF CARE
SW spoke to Greg, at [facility]'s nursing station, to check if patient could return to [facility], with generalized weakness. Per Greg, Patient was going to be DC from [facility] today, as her CEC was up, however SW stated that Patient was under PEC. SW stated that if they would not accept patient back the DC plan would switch to SNF for placement. Per Greg, she was going to reach out to [facility]'s doctor, to give CM a call, to see what course of action they would like to take. SW provided call back number and is awaiting a call back.     11 48 Per SANDRA, at [facility], Patient is being discharged from their services, and psychiatrist at [facility] is not going to PEC again. Patient's original PEC/ CEC expires today and they cannot accommodate her current condition. Per SANDRA, Patient has someone who was going to care for her upon DC from [facility]. SW inquired who would be caring for Patient. [facility]'s DON, stated he would get with is DC planner and give SW a call back with name and number.     13 38 SW called [facility] again to try and get Patient's help upon DC again. SW attempted to connect to , however no one answered, but SHERCIE was able to leave a message for Penny Kennedy. SW called [facility] and spoke to SANDRA. Per SANDRA, Discharge planner was supposed to reach out to CM. Per SANDRA, Discharge Planner is away from her desk, but as soon as she gets back, SANDRA will have her call CM.    14 06 DC planner, Tiffanie, from [facility] called SW back and stated that a [place]  - Liseth Funk ([phone]) would be assisting Patient once she was discharged from [facility] and any other post-acute type level of care. SHERICE stated she would give Liseth Funk a call and expressed gratitude to Tiffanie. SHERICE will update Psychiatrist and Attending.    SW will continue to follow and assist as needed.

## 2023-10-23 NOTE — ASSESSMENT & PLAN NOTE
Patient is identified as having Combined Systolic and Diastolic heart failure that is Acute on chronic. CHF is currently uncontrolled due to Continued edema of extremities. Latest ECHO performed and demonstrates- Results for orders placed during the hospital encounter of 10/08/23    Echo    Interpretation Summary    Rhythm is atrial flutter.    Left Ventricle: The left ventricle is normal in size. Normal wall thickness. There is moderately reduced systolic function with a visually estimated ejection fraction of 35 - 40%.    Left Atrium: Left atrium is moderately dilated.    Right Ventricle: Mild right ventricular enlargement. Systolic function is mildly reduced.    Right Atrium: Right atrium is moderately dilated.    Aortic Valve: The aortic valve is a trileaflet valve. There is mild aortic valve sclerosis.    Mitral Valve: There is mild to moderate regurgitation.    Tricuspid Valve: There is moderate regurgitation.    Pulmonic Valve: There is mild regurgitation.    Pulmonary Artery: The estimated pulmonary artery systolic pressure is 51 mmHg.    IVC/SVC: Elevated venous pressure at 15 mmHg.    Pericardium: There is a trivial effusion.  . Continue Beta Blocker and Furosemide and monitor clinical status closely. Monitor on telemetry. Patient is off CHF pathway.  Monitor strict Is&Os and daily weights.  Place on fluid restriction of 1.5 L. Cardiology has not been any consulted. Continue to stress to patient importance of self efficacy and  on diet for CHF. Last BNP reviewed- and noted below   Recent Labs   Lab 10/23/23  0531   BNP 1,830*   .  Symptoms markedly improving.  Patient with minimal shortness of breath.  Continue with titration of diuretics.

## 2023-10-23 NOTE — PT/OT/SLP PROGRESS
Physical Therapy  Treatment    Alejandra Strong   MRN: [id number]   Admitting Diagnosis: Atrial fibrillation with rapid ventricular response    PT Received On: 10/23/23  PT Start Time: 1325     PT Stop Time: 1355    PT Total Time (min): 30 min       Billable Minutes:  Gait Training 15 and Therapeutic Activity 15    Treatment Type: Treatment  PT/PTA: PTA     Number of PTA visits since last PT visit: 1       General Precautions: Standard, fall (PEC)  Orthopedic Precautions: N/A  Braces: N/A  Respiratory Status: Room air         Subjective:  Communicated with patient's nurse, Lyndsey, and completed Epic chart review prior to session.  Patient agreed to PT session. Reports her brief is soiled.     Pain/Comfort  Pain Rating 1: 0/10  Pain Rating Post-Intervention 1: 0/10    Objective:   Patient found with: peripheral IV, telemetry, Other (comments) (SITTER)    Roll R/L x2 reps each side to change brief and clean patient: SBA    Supine bridge x1 (SBA) to pull up pants (Mod A)    Supine > sit EOB: CGA    STS from EOB > RW: CGA (VC for hand placement)    75ft x2 trials w/ RW Min A (patient easily distracted at times; VC for safety w/ RW mgmt and to remain within VITOR of AD during turning)    Stand pivot T/F to EOB w/ RW: Min A     Lateral scoot x1 towards HOB: SBA    Sit > supine: CGA     Educated patient on importance of increased tolerance to upright position and direct impact on CV endurance and strength. Patient encouraged to sit up in chair/ EOB, for a minimum of 2 consecutive hours, 3x per day. Encouraged patient to perform AROM TE to BLE throughout the day within all available planes of motion. Re enforced importance of utilizing call light to meet needs in room and not attempt to get up without staff assistance. Patient verbalized understanding and agreed to comply.     AM-PAC 6 CLICK MOBILITY  How much help from another person does this patient currently need?   1 = Unable, Total/Dependent Assistance  2 = A lot,  Maximum/Moderate Assistance  3 = A little, Minimum/Contact Guard/Supervision  4 = None, Modified Fork/Independent    Turning over in bed (including adjusting bedclothes, sheets and blankets)?: 3  Sitting down on and standing up from a chair with arms (e.g., wheelchair, bedside commode, etc.): 3  Moving from lying on back to sitting on the side of the bed?: 3  Moving to and from a bed to a chair (including a wheelchair)?: 3  Need to walk in hospital room?: 3  Climbing 3-5 steps with a railing?: 1 (NT)  Basic Mobility Total Score: 16    AM-PAC Raw Score CMS G-Code Modifier Level of Impairment Assistance   6 % Total / Unable   7 - 9 CM 80 - 100% Maximal Assist   10 - 14 CL 60 - 80% Moderate Assist   15 - 19 CK 40 - 60% Moderate Assist   20 - 22 CJ 20 - 40% Minimal Assist   23 CI 1-20% SBA / CGA   24 CH 0% Independent/ Mod I     Patient left with bed in chair position with call button in reach and sitter present.    Assessment:  Alejandra Strong is a 63 y.o. female with a medical diagnosis of Atrial fibrillation with rapid ventricular response and presents with overall decline in functional mobility. Patient would continue to benefit from skilled PT to address functional limitations listed below in order to return to PLOF/decrease caregiver burden. Patient was able to demonstrate a much higher level of participation during today's session. She was able to complete bed mobility, functional transfers and gait training all with physical assistance from therapist. No dizziness or lethargy was noted or reported throughout session. She appears motivated to participate. Patient is progressing well towards goals established within PT POC.     Rehab identified problem list/impairments: weakness, impaired endurance, impaired self care skills, impaired functional mobility, gait instability, impaired balance, decreased coordination, impaired cognition, decreased upper extremity function, decreased lower extremity  function, decreased safety awareness, decreased ROM, impaired cardiopulmonary response to activity    Rehab potential is good.    Activity tolerance: Fair    Discharge recommendations: Moderate Intensity Therapy      Barriers to discharge:      Equipment recommendations: to be determined by next level of care     GOALS:   Multidisciplinary Problems       Physical Therapy Goals          Problem: Physical Therapy    Goal Priority Disciplines Outcome Goal Variances Interventions   Physical Therapy Goal     PT, PT/OT Ongoing, Progressing     Description: All LTGs to be met by 11/4/23    Bed mobility I  Transfers I  Gait of at least 150' mod I   Pt will increase AMPAC score by 2 points to progress functional mobility  Pt will tolerate > 10 min of functional activity to progress gross functional mobility                          PLAN:    Patient to be seen 3 x/week to address the above listed problems via gait training, therapeutic activities, therapeutic exercises  Plan of Care expires: 11/04/23  Plan of Care reviewed with: patient         10/23/2023

## 2023-10-23 NOTE — PROGRESS NOTES
[facility] Medicine  Progress Note    Patient Name: Alejandra Strong  MRN: [id number]  Patient Class: IP- Inpatient   Admission Date: 10/19/2023  Length of Stay: 3 days  Attending Physician: Debora White MD  Primary Care Provider: Mer, Primary Doctor        Subjective:     Principal Problem:Atrial fibrillation with rapid ventricular response        HPI:  Patient is a 63-year-old female who has  has a past medical history of Anticoagulant long-term use, Bipolar disorder unspecified, CHF (congestive heart failure) reduced ejection fraction, CKD 3 (chronic kidney disease), Diabetes mellitus type 2, Hypertension, Hypothyroidism, unspecified, and Paroxysmal atrial fibrillation.     Patient initially presented to [facility] with complaints of visual and auditory hallucinations.  She was noted to be in AFib/flutter with RVR.  She was admitted to the hospital under pec her rate was controlled with amiodarone, Toprol-XL 50 mg a day and she was discharged to [facility] for further treatment of her bipolar disorder.  She she was discharged 10/11/2023 to [facility].  She is been seen by PCP and Psychiatry there until today when she was noted to have significant swelling in her legs and feet and sent under a pec/CEC for evaluation.  In the emergency department she was noted to be in AFib/flutter rate of 114.  She was also noted to have potassium of 2.7, calcium of 5.4 corrected to 7.2 and a BNP at 9:12 a.m. which is down from 1993 on October 10th.  She was given IV Lasix calcium and potassium in the emergency department.  Her initial troponin was negative.  Patient is seen lethargic but arousable.  Knows she is in the hospital, the date, as well as her birth date.  She denies any chest pain or shortness a breath.    She is admitted under a pec/CEC will need a sitter and will consult psych for help with her meds in a.m.      Overview/Hospital Course:  Patient  admitted cardiology and Psychiatry were consulted.  Patient was begun on amiodarone drip for control for AFib with RVR.  Meds were adjusted per Psychiatry.    Patient being seen by Cardiology.  She was started on amnio drip to attempted controlled rate.  Overnight her heart rate remained high and her blood pressure this morning was low cardiology will be evaluating her soon.    Seen by cardiology and low blood pressure was started on digoxin load for control of heart rate.  Reviewing her records seems that patient has had CKD for several years and likely we will need continued diuresis.      Interval History:  Patient seen and examined at bedside.  She reports she is feeling better.  No shortness of breath still with weakness and fatigue.  Patient to work with OT and PT today and telepsych consult for re-evaluation.  She is been participating in therapy and is anxious to get better to go home.    Review of Systems   Constitutional:  Positive for activity change, appetite change and fatigue. Negative for fever.   Respiratory:  Negative for chest tightness.    Cardiovascular:  Positive for leg swelling. Negative for chest pain.   Gastrointestinal:  Negative for nausea and vomiting.   Neurological:  Positive for weakness.   Psychiatric/Behavioral:  Negative for behavioral problems.    All other systems reviewed and are negative.    Objective:     Vital Signs (Most Recent):  Temp: 98 °F (36.7 °C) (10/23/23 1159)  Pulse: 78 (10/23/23 1159)  Resp: 19 (10/23/23 1159)  BP: (!) 150/80 (10/23/23 1747)  SpO2: 95 % (10/23/23 1159) Vital Signs (24h Range):  Temp:  [97.7 °F (36.5 °C)-98.1 °F (36.7 °C)] 98 °F (36.7 °C)  Pulse:  [68-85] 78  Resp:  [18-19] 19  SpO2:  [92 %-96 %] 95 %  BP: (127-184)/(76-94) 150/80     Weight: 71.1 kg (156 lb 12.8 oz)  Body mass index is 27.78 kg/m².    Intake/Output Summary (Last 24 hours) at 10/23/2023 1812  Last data filed at 10/23/2023 0626  Gross per 24 hour   Intake --   Output 4750 ml   Net  -4750 ml         Physical Exam  Vitals reviewed.   Constitutional:       Appearance: Normal appearance.   HENT:      Head: Normocephalic and atraumatic.      Mouth/Throat:      Mouth: Mucous membranes are moist.      Pharynx: Oropharynx is clear.   Eyes:      Extraocular Movements: Extraocular movements intact.      Conjunctiva/sclera: Conjunctivae normal.   Cardiovascular:      Rate and Rhythm: Normal rate. Rhythm irregular.      Pulses: Normal pulses.      Heart sounds: Normal heart sounds.   Pulmonary:      Effort: Pulmonary effort is normal.      Breath sounds: Normal breath sounds. No wheezing.   Abdominal:      General: Bowel sounds are normal.      Palpations: Abdomen is soft.   Musculoskeletal:         General: Normal range of motion.      Cervical back: Normal range of motion and neck supple.   Skin:     General: Skin is warm and dry.   Neurological:      General: No focal deficit present.      Mental Status: She is alert and oriented to person, place, and time. Mental status is at baseline.   Psychiatric:         Behavior: Behavior normal.             Significant Labs: All pertinent labs within the past 24 hours have been reviewed.  CBC:   Recent Labs   Lab 10/22/23  0452   WBC 5.50   HGB 8.0*   HCT 28.9*        CMP:   Recent Labs   Lab 10/22/23  0452 10/23/23  0531    140   K 4.5 4.4    101   CO2 27 28    108   BUN 20 15   CREATININE 1.4 1.4   CALCIUM 8.4* 8.6*   PROT 6.9  --    ALBUMIN 2.8* 2.8*   BILITOT 0.4  --    ALKPHOS 66  --    AST 32  --    ALT 21  --    ANIONGAP 10 11     Cardiac Markers:   Recent Labs   Lab 10/23/23  0531   BNP 1,830*     Magnesium:   Recent Labs   Lab 10/22/23  0452   MG 2.2       Significant Imaging: I have reviewed all pertinent imaging results/findings within the past 24 hours.      Assessment/Plan:      * Atrial fibrillation with rapid ventricular response  Patient with Long standing persistent (>12 months) atrial fibrillation which is uncontrolled  currently with Beta Blocker and Amiodarone. Patient is currently in atrial fibrillation.[id number] Score: 1. HASBLED Score: . Anticoagulation indicated. Anticoagulation done with Xarelto     Continue outpatient amiodarone and metoprolol  Appreciate cardiology consult.      Discussing with Cardiology who recommended loading dose of due to at 0.25 mg q.6 hours x4 doses  Rate control.  Patient is remaining on amiodarone load and is on did 0.125 mg daily.      Stage 3b chronic kidney disease  In reviewing patient's records her creatinine has typically run somewhere between 2 and 2.3 at least as far back as 2018.  I suspect with her decreased cardiac function and volume overload that she is returning to her baseline function      Acute on chronic combined systolic and diastolic congestive heart failure  Patient is identified as having Combined Systolic and Diastolic heart failure that is Acute on chronic. CHF is currently uncontrolled due to Continued edema of extremities. Latest ECHO performed and demonstrates- Results for orders placed during the hospital encounter of 10/08/23    Echo    Interpretation Summary    Rhythm is atrial flutter.    Left Ventricle: The left ventricle is normal in size. Normal wall thickness. There is moderately reduced systolic function with a visually estimated ejection fraction of 35 - 40%.    Left Atrium: Left atrium is moderately dilated.    Right Ventricle: Mild right ventricular enlargement. Systolic function is mildly reduced.    Right Atrium: Right atrium is moderately dilated.    Aortic Valve: The aortic valve is a trileaflet valve. There is mild aortic valve sclerosis.    Mitral Valve: There is mild to moderate regurgitation.    Tricuspid Valve: There is moderate regurgitation.    Pulmonic Valve: There is mild regurgitation.    Pulmonary Artery: The estimated pulmonary artery systolic pressure is 51 mmHg.    IVC/SVC: Elevated venous pressure at 15 mmHg.    Pericardium:  There is a trivial effusion.  . Continue Beta Blocker and Furosemide and monitor clinical status closely. Monitor on telemetry. Patient is off CHF pathway.  Monitor strict Is&Os and daily weights.  Place on fluid restriction of 1.5 L. Cardiology has not been any consulted. Continue to stress to patient importance of self efficacy and  on diet for CHF. Last BNP reviewed- and noted below   Recent Labs   Lab 10/23/23  0531   BNP 1,830*   .  Symptoms markedly improving.  Patient with minimal shortness of breath.  Continue with titration of diuretics.    Hypothyroidism  Patient currently on replacement dose we will continue and recheck in a.m.      Essential hypertension  Change back to Toprol-XL as blood pressure is now improved        Dyslipidemia  Continue patient's outpatient statin      Bipolar disorder  Patient admitted from [facility]  She has a diagnosis of bipolar just is under [facility] is reportedly homeless and there prior to admission had auditory and visual hallucinations currently she was hyperactive with delusions and unable to understand her situation or take her medications.  She was started on Depakote  mg b.i.d. Zoloft 100 mg daily trazodone 100 mg q.h.s. was just recently started on Risperdal 1 mg p.o. b.i.d. for visual hallucinations.  At the time they were considering nursing home versus group home placement    Appreciate telepsych consult meds were adjusted per their recommendations    Seen by telepsych again today 10/23 with recommendations of medication adjustments reviewed.  No longer needed CEC        VTE Risk Mitigation (From admission, onward)         Ordered     rivaroxaban tablet 15 mg  With dinner         10/20/23 0994     Reason for No Pharmacological VTE Prophylaxis  Once        Question:  Reasons:  Answer:  Already adequately anticoagulated on oral Anticoagulants    10/19/23 1750     IP VTE HIGH RISK PATIENT  Once         10/19/23 1750     Place  sequential compression device  Until discontinued         10/19/23 1210                Discharge Planning   EBEN:      Code Status: Full Code   Is the patient medically ready for discharge?: No    Reason for patient still in hospital (select all that apply): Patient trending condition, Laboratory test and Pending disposition  Discharge Plan A: [facility]                  Debora Trinh MD  Department of Hospital Medicine   Health systemetry ([facility])

## 2023-10-24 PROCEDURE — 97530 THERAPEUTIC ACTIVITIES: CPT | Mod: CQ

## 2023-10-24 PROCEDURE — 21400001 HC TELEMETRY ROOM

## 2023-10-24 PROCEDURE — 97530 THERAPEUTIC ACTIVITIES: CPT

## 2023-10-24 PROCEDURE — 25000003 PHARM REV CODE 250: Performed by: STUDENT IN AN ORGANIZED HEALTH CARE EDUCATION/TRAINING PROGRAM

## 2023-10-24 PROCEDURE — 25000003 PHARM REV CODE 250: Performed by: INTERNAL MEDICINE

## 2023-10-24 PROCEDURE — 97116 GAIT TRAINING THERAPY: CPT | Mod: CQ

## 2023-10-24 RX ADMIN — AMIODARONE HYDROCHLORIDE 400 MG: 200 TABLET ORAL at 09:10

## 2023-10-24 RX ADMIN — LEVOTHYROXINE SODIUM 50 MCG: 50 TABLET ORAL at 05:10

## 2023-10-24 RX ADMIN — DIVALPROEX SODIUM 500 MG: 500 TABLET, DELAYED RELEASE ORAL at 09:10

## 2023-10-24 RX ADMIN — DIVALPROEX SODIUM 250 MG: 250 TABLET, DELAYED RELEASE ORAL at 08:10

## 2023-10-24 RX ADMIN — SERTRALINE HYDROCHLORIDE 50 MG: 50 TABLET ORAL at 08:10

## 2023-10-24 RX ADMIN — TOBRAMYCIN AND DEXAMETHASONE 1 DROP: 3; 1 SUSPENSION/ DROPS OPHTHALMIC at 05:10

## 2023-10-24 RX ADMIN — AMIODARONE HYDROCHLORIDE 400 MG: 200 TABLET ORAL at 08:10

## 2023-10-24 RX ADMIN — SENNOSIDES AND DOCUSATE SODIUM 1 TABLET: 8.6; 5 TABLET ORAL at 09:10

## 2023-10-24 RX ADMIN — TOBRAMYCIN AND DEXAMETHASONE 1 DROP: 3; 1 SUSPENSION/ DROPS OPHTHALMIC at 09:10

## 2023-10-24 RX ADMIN — METOPROLOL SUCCINATE 50 MG: 50 TABLET, EXTENDED RELEASE ORAL at 08:10

## 2023-10-24 RX ADMIN — TOBRAMYCIN AND DEXAMETHASONE 1 DROP: 3; 1 SUSPENSION/ DROPS OPHTHALMIC at 10:10

## 2023-10-24 RX ADMIN — TOBRAMYCIN AND DEXAMETHASONE 1 DROP: 3; 1 SUSPENSION/ DROPS OPHTHALMIC at 01:10

## 2023-10-24 RX ADMIN — RIVAROXABAN 15 MG: 15 TABLET, FILM COATED ORAL at 04:10

## 2023-10-24 RX ADMIN — MELATONIN TAB 3 MG 3 MG: 3 TAB at 09:10

## 2023-10-24 RX ADMIN — ATORVASTATIN CALCIUM 20 MG: 10 TABLET, FILM COATED ORAL at 08:10

## 2023-10-24 RX ADMIN — TORSEMIDE 40 MG: 10 TABLET ORAL at 04:10

## 2023-10-24 RX ADMIN — DIGOXIN 0.12 MG: 125 TABLET ORAL at 08:10

## 2023-10-24 RX ADMIN — TRAZODONE HYDROCHLORIDE 100 MG: 100 TABLET ORAL at 09:10

## 2023-10-24 RX ADMIN — TORSEMIDE 40 MG: 10 TABLET ORAL at 08:10

## 2023-10-24 NOTE — PLAN OF CARE
SW meet with Patient at bedside to discuss SNF placement upon discharge. Patient was agreeable to SNF placement, as long as it was not in [place], [place]. SW stated she had a list of SNF placement's in [place], [place] and [place]. Patient was agreeable to [place] and inquired about [facility]. SW stated if it was not on the list provided, she would have to look into other placed. Patient was agreeable to looking into referrals, but did not wish for referrals to be sent out. SW will go back and speak with Patient at bedside to inquire about referrals.     SW called [facility], to check if they do one time contracts for Medicaid insurance. SW was unable to speak to anyone but left message for Elsi Wynne, in admissions.     13 31 SW returned to Patient's room and patient was walking with PT/ OT. SW meet with Patient at the end of the keys to inquire about SNF placement. SW stated that [facility] was not in network with Patient's Medicaid. However, [facility] would be the closest to [place], [place], for placement. Patient was agreeable to referral being sent to [facility]. SW will send referral to [facility], [facility] and update Attending.     13 45 SW received a call from [place] [facility], stating that Patient has noted intellectual disability and PG. 3 of PASRR needs to be redone. SW completed PG 3 and re-faxed PASRR.     14 25 SW received a call back from Elsi Wynne, Admissions at [facility]. Per Elsi, they can review the referral and see if they could accommodate Patient. However, per Elsi, they have issues with insurance authorization, with 2 other placement options around them. SW stated she would send referral for them to review. Per Elsi, she would like their SSC and director know to begin reviewing referral.     15 03 SW received messaged from [facility] stating that Patient did not meet  criteral for hospital exception. [facility] requested page 4 be redone, if hospital exception is still requested. SHERICE redid Page 4 and resubmitted PASRR Packet.    15 39 [facility] was unable to accept or accommodate Patient. SHERICE sent referral out to 3 additional facilities, [place], [facility], and [facility].     SHERICE will continue to follow and assist as needed.

## 2023-10-24 NOTE — SUBJECTIVE & OBJECTIVE
Interval History:  Patient feeling better today.  He is excited about getting out of the hospital.  States she is longer depressed and willing to participate actively in therapy.    Review of Systems   Constitutional:  Positive for fatigue. Negative for fever.   Respiratory:  Negative for cough and shortness of breath.    Cardiovascular:  Positive for leg swelling. Negative for chest pain.   Gastrointestinal:  Negative for nausea and vomiting.   All other systems reviewed and are negative.    Objective:     Vital Signs (Most Recent):  Temp: 98.5 °F (36.9 °C) (10/24/23 1212)  Pulse: 90 (10/24/23 1310)  Resp: 18 (10/24/23 1212)  BP: 125/73 (10/24/23 1212)  SpO2: (!) 93 % (10/24/23 1212) Vital Signs (24h Range):  Temp:  [97.2 °F (36.2 °C)-98.5 °F (36.9 °C)] 98.5 °F (36.9 °C)  Pulse:  [76-99] 90  Resp:  [16-18] 18  SpO2:  [93 %-96 %] 93 %  BP: (125-150)/(68-85) 125/73     Weight: 62 kg (136 lb 11 oz)  Body mass index is 24.21 kg/m².    Intake/Output Summary (Last 24 hours) at 10/24/2023 1431  Last data filed at 10/24/2023 1143  Gross per 24 hour   Intake 240 ml   Output 3850 ml   Net -3610 ml         Physical Exam  Vitals reviewed.   Constitutional:       Appearance: Normal appearance.   HENT:      Head: Normocephalic and atraumatic.      Mouth/Throat:      Mouth: Mucous membranes are moist.      Pharynx: Oropharynx is clear.   Eyes:      Extraocular Movements: Extraocular movements intact.      Conjunctiva/sclera: Conjunctivae normal.   Cardiovascular:      Rate and Rhythm: Normal rate. Rhythm irregular.      Pulses: Normal pulses.      Heart sounds: Normal heart sounds.   Pulmonary:      Effort: Pulmonary effort is normal.      Breath sounds: Normal breath sounds. No wheezing or rales.   Abdominal:      General: Bowel sounds are normal. There is no distension.      Palpations: Abdomen is soft.      Tenderness: There is no abdominal tenderness.   Musculoskeletal:         General: Normal range of motion.      Cervical  back: Normal range of motion and neck supple.   Skin:     General: Skin is warm and dry.   Neurological:      General: No focal deficit present.      Mental Status: She is alert and oriented to person, place, and time. Mental status is at baseline.   Psychiatric:         Behavior: Behavior normal.             Significant Labs: All pertinent labs within the past 24 hours have been reviewed.    CMP:   Recent Labs   Lab 10/23/23  0531      K 4.4      CO2 28      BUN 15   CREATININE 1.4   CALCIUM 8.6*   ALBUMIN 2.8*   ANIONGAP 11       Significant Imaging: I have reviewed all pertinent imaging results/findings within the past 24 hours.

## 2023-10-24 NOTE — PT/OT/SLP PROGRESS
Occupational Therapy      Patient Name:  Alejandra Strong   MRN:  [id number]    Patient not seen today secondary to aide present in room cleaning patient at 07:40 AM. Followed up at 07:50 pt declined therapy at time due to eating breakfast . Will follow-up at next available opportunity.    10/24/2023  SHIVAM Delacruz  07:50 AM

## 2023-10-24 NOTE — PT/OT/SLP PROGRESS
"Occupational Therapy   Treatment    Name: Alejandra Strong  MRN: [id number]  Admitting Diagnosis:  Atrial fibrillation with rapid ventricular response       Recommendations:     Discharge Recommendations: Moderate Intensity Therapy  Discharge Equipment Recommendations:  to be determined by next level of care  Barriers to discharge:  Decreased caregiver support (homelessness)    Assessment:     Alejandra Strong is a 63 y.o. female with a medical diagnosis of Atrial fibrillation with rapid ventricular response.  She presents with the following performance deficits affecting function are weakness, impaired endurance, impaired self care skills, impaired functional mobility, impaired balance, decreased safety awareness, impaired cardiopulmonary response to activity, impaired fine motor, impaired skin.     Rehab Prognosis:  Fair; patient would benefit from acute skilled OT services to address these deficits and reach maximum level of function.       Plan:     Patient to be seen 2 x/week to address the above listed problems via self-care/home management, therapeutic activities, therapeutic exercises  Plan of Care Expires: 11/04/23  Plan of Care Reviewed with: patient    Subjective     Chief Complaint: Reported "I am ready to get back home.'  Patient/Family Comments/goals: get better  Pain/Comfort:  Pain Rating 1: 0/10    Objective:     Communicated with: NurseAna, prior to session.  Patient found supine with peripheral IV, telemetry, bed alarm upon OT entry to room.    General Precautions: Standard, fall    Orthopedic Precautions:N/A  Braces: N/A  Respiratory Status: Room air     Occupational Performance:     Bed Mobility:    Patient completed Supine to Sit with contact guard assistance     Functional Mobility/Transfers:  Patient completed Sit <> Stand Transfer with minimum assistance  with  rolling walker   Patient completed Bed <> Chair Transfer using Step Transfer technique with minimum assistance with rolling " walker  Functional Mobility: Patient completed x100ft x2 trials functional mobility with RW and min A to increase dynamic standing balance and activity tolerance needed for ADL completion.  Provided with v/c for technique with transfer to increase safety and independence with completion.  Provided with increased time for mobility as well.  Educated on appropriate RW use.    Activities of Daily Living:  Toileting: total assistance noted to be incontinent of bowel and bladder during ambulation trial. Upon return to room completed dependent brief change and toileting hygiene in standing. Requires min A for static standing balance     [facility] 6 Click ADL: 16    Treatment & Education:  Patient tolerated intervention well overall. Educated on benefits of OOB activity and importance of calling for A to transfer back to bed. Encouraged completion of B UE AROM therex throughout the day to increase functional strength and activity tolerance needed for ADL completion. Patient stated understanding and in agreement with POC.    Patient left up in chair with all lines intact, call button in reach, chair alarm on, and nurse present    GOALS:   Multidisciplinary Problems       Occupational Therapy Goals          Problem: Occupational Therapy    Goal Priority Disciplines Outcome Interventions   Occupational Therapy Goal     OT, PT/OT Ongoing, Progressing    Description: LTG'S TO BE MET IN 14 DAYS (11/4/23)    1)  PATIENT WILL PERFORM UB DRESSING WITH SBA DUE TO PATIENT LIVES ALONE IN [facility] WITH DECREASED CAREGIVER SUPPORT.    2)  PATIENT WILL PERFORM LB DRESSING WITH  SBA DUE TO [facility] WITH DECREASED CAREGIVER SUPPORT.    3)  PATIENT WILL PERFORM TOILET T/F WITH  SBA DUE TO PATIENT LIVES ALONE IN [facility] WITH DECREASED CAREGIVER SUPPORT.    4)  PATIENT WILL PERFORM STANDING AT SINK X5-X10 MIN WITH (S) WITH RW IF NEEDED TO PERFORM SIMPLE GROOMING/HYGIENE WITH NO LOB.                          Time Tracking:     OT Date of Treatment: 10/24/23  OT Start Time: 1310  OT Stop Time: 1335  OT Total Time (min): 25 min    Billable Minutes:Therapeutic Activity 25    Radha Britt OT  10/24/2023

## 2023-10-24 NOTE — ASSESSMENT & PLAN NOTE
Patient admitted from [facility]  She has a diagnosis of bipolar just is under [facility] is reportedly homeless and there prior to admission had auditory and visual hallucinations currently she was hyperactive with delusions and unable to understand her situation or take her medications.  She was started on Depakote  mg b.i.d. Zoloft 100 mg daily trazodone 100 mg q.h.s. was just recently started on Risperdal 1 mg p.o. b.i.d. for visual hallucinations.  At the time they were considering nursing home versus group home placement    Appreciate telepsych consult meds were adjusted per their recommendations    Seen by telepsych again today 10/23 with recommendations of medication adjustments reviewed.  No longer needed [facility]

## 2023-10-24 NOTE — ASSESSMENT & PLAN NOTE
Patient with Long standing persistent (>12 months) atrial fibrillation which is uncontrolled currently with Beta Blocker and Amiodarone. Patient is currently in atrial fibrillation.[id number] Score: 1. HASBLED Score: . Anticoagulation indicated. Anticoagulation done with Xarelto     Continue outpatient amiodarone and metoprolol  Appreciate cardiology consult.      Discussing with Cardiology who recommended loading dose of due to at 0.25 mg q.6 hours x4 doses  Rate control.  Patient is remaining on amiodarone load and is on dig 0.125 mg daily.

## 2023-10-24 NOTE — PT/OT/SLP PROGRESS
Physical Therapy  Treatment    Alejandra Strong   MRN: [id number]   Admitting Diagnosis: Atrial fibrillation with rapid ventricular response    PT Received On: 10/24/23  PT Start Time: 1310     PT Stop Time: 1340    PT Total Time (min): 30 min       Billable Minutes:  Gait Training 15 and Therapeutic Activity 15    Treatment Type: Treatment  PT/PTA: PTA     Number of PTA visits since last PT visit: 2       General Precautions: Standard, fall  Orthopedic Precautions: N/A  Braces: N/A  Respiratory Status: Room air         Subjective:  Communicated with patient's nurse, Ana, and completed Epic chart review prior to session.  Patient agreed to PT session.     Pain/Comfort  Pain Rating 1: 0/10  Pain Rating Post-Intervention 1: 0/10    Objective:   Patient found with: bed alarm, telemetry, peripheral IV    Supine > sit EOB: CGA    Forward scoot towards EOB: SBA    STS from EOB > RW: Min A (VC for hand placement)    100ft x2 trials w/ RW Min A (intermittent VC for safety w/ RW mgmt and to remain within VITOR of AD)    Stand pivot T/F to chair w/ RW: Min A (VC for sequencing of task)    Total assistance noted to be incontinent of bowel and bladder during ambulation trial. Completed dependent brief change and toileting hygiene in standing. Requires min A for static standing with BUE support on RW.     Educated patient on importance of increased tolerance to upright position and direct impact on CV endurance and strength. Patient encouraged to sit up in chair/ EOB, for a minimum of 2 consecutive hours, 3x per day. Encouraged patient to perform AROM TE to BLE throughout the day within all available planes of motion. Re enforced importance of utilizing call light to meet needs in room and not attempt to get up without staff assistance. Patient verbalized understanding and agreed to comply.      AM-PAC 6 CLICK MOBILITY  How much help from another person does this patient currently need?   1 = Unable, Total/Dependent  Assistance  2 = A lot, Maximum/Moderate Assistance  3 = A little, Minimum/Contact Guard/Supervision  4 = None, Modified [place]/Independent    Turning over in bed (including adjusting bedclothes, sheets and blankets)?: 3  Sitting down on and standing up from a chair with arms (e.g., wheelchair, bedside commode, etc.): 3  Moving from lying on back to sitting on the side of the bed?: 3  Moving to and from a bed to a chair (including a wheelchair)?: 3  Need to walk in hospital room?: 3  Climbing 3-5 steps with a railing?: 1 (NT)  Basic Mobility Total Score: 16    AM-PAC Raw Score CMS G-Code Modifier Level of Impairment Assistance   6 % Total / Unable   7 - 9 CM 80 - 100% Maximal Assist   10 - 14 CL 60 - 80% Moderate Assist   15 - 19 CK 40 - 60% Moderate Assist   20 - 22 CJ 20 - 40% Minimal Assist   23 CI 1-20% SBA / CGA   24 CH 0% Independent/ Mod I     Patient left up in chair with call button in reach, chair alarm on, and nurse present.    Assessment:  Alejandra Strong is a 63 y.o. female with a medical diagnosis of Atrial fibrillation with rapid ventricular response and presents with overall decline in functional mobility. Patient would continue to benefit from skilled PT to address functional limitations listed below in order to return to PLOF/decrease caregiver burden.    Rehab identified problem list/impairments: weakness, impaired endurance, impaired self care skills, impaired functional mobility, gait instability, impaired balance, decreased safety awareness, impaired cardiopulmonary response to activity, decreased lower extremity function    Rehab potential is good.    Activity tolerance: Fair    Discharge recommendations: Moderate Intensity Therapy      Barriers to discharge:      Equipment recommendations: to be determined by next level of care     GOALS:   Multidisciplinary Problems       Physical Therapy Goals          Problem: Physical Therapy    Goal Priority Disciplines Outcome Goal  Variances Interventions   Physical Therapy Goal     PT, PT/OT Ongoing, Progressing     Description: All LTGs to be met by 11/4/23    Bed mobility I  Transfers I  Gait of at least 150' mod I   Pt will increase AMPAC score by 2 points to progress functional mobility  Pt will tolerate > 10 min of functional activity to progress gross functional mobility                          PLAN:    Patient to be seen 3 x/week to address the above listed problems via gait training, therapeutic activities, therapeutic exercises  Plan of Care expires: 11/04/23  Plan of Care reviewed with: patient         10/24/2023

## 2023-10-24 NOTE — PLAN OF CARE
A214/A214 CHIRAG Strong is a 63 y.o.female admitted on 10/19/2023 for Atrial fibrillation with rapid ventricular response   Code Status: Full Code MRN: [id number]   Review of patient's allergies indicates:   Allergen Reactions    Pcn [penicillins] Itching     Past Medical History:   Diagnosis Date    Anticoagulant long-term use     Bipolar disorder, unspecified     Bipolar disorder, unspecified     CHF (congestive heart failure)     CKD (chronic kidney disease)     Diabetes mellitus     Hypertension     Hypothyroidism, unspecified     Paroxysmal atrial fibrillation       PRN meds    acetaminophen, 650 mg, Q8H PRN  acetaminophen, 650 mg, Q4H PRN  dextrose 10%, 12.5 g, PRN  dextrose 10%, 25 g, PRN  glucagon (human recombinant), 1 mg, PRN  glucose, 16 g, PRN  glucose, 24 g, PRN  hydrALAZINE, 10 mg, Q8H PRN  naloxone, 0.02 mg, PRN  nitroGLYCERIN, 0.4 mg, Q5 Min PRN  OLANZapine, 2.5 mg, Q8H PRN  ondansetron, 4 mg, Q8H PRN  simethicone, 1 tablet, QID PRN  sodium chloride 0.9%, 3 mL, Q12H PRN      Pt worked with PT/OT. No falls were reported on shift and hourly rounding is complete. Cardiac and lab monitoring continues. Chart check completed. Will continue plan of care.               Lead Monitored: Lead II Rhythm: atrial rhythm    Cardiac/Telemetry Box Number: [id number]  VTE Required Core Measure: Pharmacological prophylaxis initiated/maintained Last Bowel Movement: 10/23/23  Diet Soft & Bite Sized (IDDSI Level 6) Cardiac (Low Na/Chol); Fluid - 1200mL; PEC/CEC - Styrofoam  Voiding Characteristics: incontinence, external catheter  Willie Score: 18  Fall Risk Score: 15  Accucheck []   Freq?      Lines/Drains/Airways       Peripheral Intravenous Line  Duration                  Peripheral IV - Single Lumen 10/21/23 0130 20 G Distal;Left;Posterior Forearm 3 days

## 2023-10-24 NOTE — PT/OT/SLP PROGRESS
Physical Therapy      Patient Name:  Alejandra Strong   MRN:  [id number]    07:40 a.m.  Patient being cleaned/bathed at this time.     07:50 a.m.  Patient eating breakfast at this time and requesting to finish prior to participating in PT session.     Will follow up at next available opportunity.

## 2023-10-24 NOTE — PROGRESS NOTES
[facility] Medicine  Progress Note    Patient Name: Aeljandra Strong  MRN: [id number]  Patient Class: IP- Inpatient   Admission Date: 10/19/2023  Length of Stay: 4 days  Attending Physician: Debora White MD  Primary Care Provider: Mer, Primary Doctor        Subjective:     Principal Problem:Atrial fibrillation with rapid ventricular response        HPI:  Patient is a 63-year-old female who has  has a past medical history of Anticoagulant long-term use, Bipolar disorder unspecified, CHF (congestive heart failure) reduced ejection fraction, CKD 3 (chronic kidney disease), Diabetes mellitus type 2, Hypertension, Hypothyroidism, unspecified, and Paroxysmal atrial fibrillation.     Patient initially presented to [facility] with complaints of visual and auditory hallucinations.  She was noted to be in AFib/flutter with RVR.  She was admitted to the hospital under pec her rate was controlled with amiodarone, Toprol-XL 50 mg a day and she was discharged to [facility] for further treatment of her bipolar disorder.  She she was discharged 10[facility]11/[facility].  She is been seen by PCP and Psychiatry there until today when she was noted to have significant swelling in her legs and feet and sent under a pec/CEC for evaluation.  In the emergency department she was noted to be in AFib/flutter rate of 114.  She was also noted to have potassium of 2.7, calcium of 5.4 corrected to 7.2 and a BNP at 9:12 a.m. which is down from 1993 on October 10th.  She was given IV Lasix calcium and potassium in the emergency department.  Her initial troponin was negative.  Patient is seen lethargic but arousable.  Knows she is in the hospital, the date, as well as her birth date.  She denies any chest pain or shortness a breath.    She is admitted under a pec/CEC will need a sitter and will consult psych for help with her meds in a.m.      Overview/Hospital Course:  Patient  admitted cardiology and Psychiatry were consulted.  Patient was begun on amiodarone drip for control for AFib with RVR.  Meds were adjusted per Psychiatry.    Patient being seen by Cardiology.  She was started on amnio drip to attempted controlled rate.  Overnight her heart rate remained high and her blood pressure this morning was low cardiology will be evaluating her soon.    Seen by cardiology and low blood pressure was started on digoxin load for control of heart rate.  Reviewing her records seems that patient has had CKD for several years and likely we will need continued diuresis.    With loading dose of amiodarone, and digoxin patient's heart rate controlled.  Seen again by psychiatry who felt that adjust medications as appropriate but that she was no longer at risk in PEC/CPC was canceled.  Patient is weak and debilitated and therefore seen by PT and OT with recommendation for skilled nursing placement prior to returning home.  Patient would like to go to skilled nursing facility closer to her home near [place] therefore order was placed for social work to attempt to place her in a facility there.        Interval History:  Patient feeling better today.  He is excited about getting out of the hospital.  States she is longer depressed and willing to participate actively in therapy.    Review of Systems   Constitutional:  Positive for fatigue. Negative for fever.   Respiratory:  Negative for cough and shortness of breath.    Cardiovascular:  Positive for leg swelling. Negative for chest pain.   Gastrointestinal:  Negative for nausea and vomiting.   All other systems reviewed and are negative.    Objective:     Vital Signs (Most Recent):  Temp: 98.5 °F (36.9 °C) (10/24/23 1212)  Pulse: 90 (10/24/23 1310)  Resp: 18 (10/24/23 1212)  BP: 125/73 (10/24/23 1212)  SpO2: (!) 93 % (10/24/23 1212) Vital Signs (24h Range):  Temp:  [97.2 °F (36.2 °C)-98.5 °F (36.9 °C)] 98.5 °F (36.9 °C)  Pulse:  [76-99] 90  Resp:   [16-18] 18  SpO2:  [93 %-96 %] 93 %  BP: (125-150)/(68-85) 125/73     Weight: 62 kg (136 lb 11 oz)  Body mass index is 24.21 kg/m².    Intake/Output Summary (Last 24 hours) at 10/24/2023 1431  Last data filed at 10/24/2023 1143  Gross per 24 hour   Intake 240 ml   Output 3850 ml   Net -3610 ml         Physical Exam  Vitals reviewed.   Constitutional:       Appearance: Normal appearance.   HENT:      Head: Normocephalic and atraumatic.      Mouth/Throat:      Mouth: Mucous membranes are moist.      Pharynx: Oropharynx is clear.   Eyes:      Extraocular Movements: Extraocular movements intact.      Conjunctiva/sclera: Conjunctivae normal.   Cardiovascular:      Rate and Rhythm: Normal rate. Rhythm irregular.      Pulses: Normal pulses.      Heart sounds: Normal heart sounds.   Pulmonary:      Effort: Pulmonary effort is normal.      Breath sounds: Normal breath sounds. No wheezing or rales.   Abdominal:      General: Bowel sounds are normal. There is no distension.      Palpations: Abdomen is soft.      Tenderness: There is no abdominal tenderness.   Musculoskeletal:         General: Normal range of motion.      Cervical back: Normal range of motion and neck supple.   Skin:     General: Skin is warm and dry.   Neurological:      General: No focal deficit present.      Mental Status: She is alert and oriented to person, place, and time. Mental status is at baseline.   Psychiatric:         Behavior: Behavior normal.             Significant Labs: All pertinent labs within the past 24 hours have been reviewed.    CMP:   Recent Labs   Lab 10/23/23  0531      K 4.4      CO2 28      BUN 15   CREATININE 1.4   CALCIUM 8.6*   ALBUMIN 2.8*   ANIONGAP 11       Significant Imaging: I have reviewed all pertinent imaging results/findings within the past 24 hours.      Assessment/Plan:      * Atrial fibrillation with rapid ventricular response  Patient with Long standing persistent (>12 months) atrial fibrillation  which is uncontrolled currently with Beta Blocker and Amiodarone. Patient is currently in atrial fibrillation.[id number] Score: 1. HASBLED Score: . Anticoagulation indicated. Anticoagulation done with Xarelto     Continue outpatient amiodarone and metoprolol  Appreciate cardiology consult.      Discussing with Cardiology who recommended loading dose of due to at 0.25 mg q.6 hours x4 doses  Rate control.  Patient is remaining on amiodarone load and is on dig 0.125 mg daily.      Stage 3b chronic kidney disease  In reviewing patient's records her creatinine has typically run somewhere between 2 and 2.3 at least as far back as 2018.  I suspect with her decreased cardiac function and volume overload that she is returning to her baseline function      Acute on chronic combined systolic and diastolic congestive heart failure  Patient is identified as having Combined Systolic and Diastolic heart failure that is Acute on chronic. CHF is currently uncontrolled due to Continued edema of extremities. Latest ECHO performed and demonstrates- Results for orders placed during the hospital encounter of 10/08/23    Echo    Interpretation Summary    Rhythm is atrial flutter.    Left Ventricle: The left ventricle is normal in size. Normal wall thickness. There is moderately reduced systolic function with a visually estimated ejection fraction of 35 - 40%.    Left Atrium: Left atrium is moderately dilated.    Right Ventricle: Mild right ventricular enlargement. Systolic function is mildly reduced.    Right Atrium: Right atrium is moderately dilated.    Aortic Valve: The aortic valve is a trileaflet valve. There is mild aortic valve sclerosis.    Mitral Valve: There is mild to moderate regurgitation.    Tricuspid Valve: There is moderate regurgitation.    Pulmonic Valve: There is mild regurgitation.    Pulmonary Artery: The estimated pulmonary artery systolic pressure is 51 mmHg.    IVC/SVC: Elevated venous pressure at 15  mmHg.    Pericardium: There is a trivial effusion.  . Continue Beta Blocker and Furosemide and monitor clinical status closely. Monitor on telemetry. Patient is off CHF pathway.  Monitor strict Is&Os and daily weights.  Place on fluid restriction of 1.5 L. Cardiology has not been any consulted. Continue to stress to patient importance of self efficacy and  on diet for CHF. Last BNP reviewed- and noted below   Recent Labs   Lab 10/23/23  0531   BNP 1,830*   .  Symptoms markedly improving.  Patient with minimal shortness of breath.  Continue with titration of diuretics.    Hypothyroidism  Patient currently on replacement dose we will continue and recheck in a.m.      Essential hypertension  Change back to Toprol-XL as blood pressure is now improved        Dyslipidemia  Continue patient's outpatient statin      Bipolar disorder  Patient admitted from [facility]  She has a diagnosis of bipolar just is under [facility] is reportedly homeless and there prior to admission had auditory and visual hallucinations currently she was hyperactive with delusions and unable to understand her situation or take her medications.  She was started on Depakote  mg b.i.d. Zoloft 100 mg daily trazodone 100 mg q.h.s. was just recently started on Risperdal 1 mg p.o. b.i.d. for visual hallucinations.  At the time they were considering nursing home versus group home placement    Appreciate telepsych consult meds were adjusted per their recommendations    Seen by telepsych again today 10/23 with recommendations of medication adjustments reviewed.  No longer needed CEC        VTE Risk Mitigation (From admission, onward)         Ordered     rivaroxaban tablet 15 mg  With dinner         10/20/23 0938     Reason for No Pharmacological VTE Prophylaxis  Once        Question:  Reasons:  Answer:  Already adequately anticoagulated on oral Anticoagulants    10/19/23 1750     IP VTE HIGH RISK PATIENT  Once         10/19/23  1750     Place sequential compression device  Until discontinued         10/19/23 1750                Discharge Planning   EBEN:      Code Status: Full Code   Is the patient medically ready for discharge?: No    Reason for patient still in hospital (select all that apply): Patient trending condition, Treatment and Pending disposition  Discharge Plan A: [facility]                  Debora Trinh MD  Department of Hospital Medicine   [facility] ([facility])

## 2023-10-25 LAB
ALBUMIN SERPL BCP-MCNC: 3.1 G/DL (ref 3.5–5.2)
ANION GAP SERPL CALC-SCNC: 16 MMOL/L (ref 8–16)
BUN SERPL-MCNC: 19 MG/DL (ref 8–23)
CALCIUM SERPL-MCNC: 9 MG/DL (ref 8.7–10.5)
CHLORIDE SERPL-SCNC: 91 MMOL/L (ref 95–110)
CO2 SERPL-SCNC: 31 MMOL/L (ref 23–29)
CREAT SERPL-MCNC: 1.9 MG/DL (ref 0.5–1.4)
EST. GFR  (NO RACE VARIABLE): 29 ML/MIN/1.73 M^2
GLUCOSE SERPL-MCNC: 107 MG/DL (ref 70–110)
PHOSPHATE SERPL-MCNC: 4.2 MG/DL (ref 2.7–4.5)
POTASSIUM SERPL-SCNC: 3.6 MMOL/L (ref 3.5–5.1)
SODIUM SERPL-SCNC: 138 MMOL/L (ref 136–145)

## 2023-10-25 PROCEDURE — 97530 THERAPEUTIC ACTIVITIES: CPT | Mod: CQ

## 2023-10-25 PROCEDURE — 36415 COLL VENOUS BLD VENIPUNCTURE: CPT | Performed by: INTERNAL MEDICINE

## 2023-10-25 PROCEDURE — 80069 RENAL FUNCTION PANEL: CPT | Performed by: INTERNAL MEDICINE

## 2023-10-25 PROCEDURE — 97116 GAIT TRAINING THERAPY: CPT | Mod: CQ

## 2023-10-25 PROCEDURE — 21400001 HC TELEMETRY ROOM

## 2023-10-25 PROCEDURE — 25000003 PHARM REV CODE 250: Performed by: INTERNAL MEDICINE

## 2023-10-25 PROCEDURE — 97535 SELF CARE MNGMENT TRAINING: CPT

## 2023-10-25 PROCEDURE — 97530 THERAPEUTIC ACTIVITIES: CPT

## 2023-10-25 PROCEDURE — 25000003 PHARM REV CODE 250: Performed by: STUDENT IN AN ORGANIZED HEALTH CARE EDUCATION/TRAINING PROGRAM

## 2023-10-25 RX ORDER — DIGOXIN 125 MCG
0.12 TABLET ORAL
Status: DISCONTINUED | OUTPATIENT
Start: 2023-10-25 | End: 2023-10-29

## 2023-10-25 RX ORDER — TORSEMIDE 10 MG/1
40 TABLET ORAL DAILY
Status: DISCONTINUED | OUTPATIENT
Start: 2023-10-26 | End: 2023-10-31 | Stop reason: HOSPADM

## 2023-10-25 RX ADMIN — ATORVASTATIN CALCIUM 20 MG: 10 TABLET, FILM COATED ORAL at 08:10

## 2023-10-25 RX ADMIN — MELATONIN TAB 3 MG 3 MG: 3 TAB at 09:10

## 2023-10-25 RX ADMIN — METOPROLOL SUCCINATE 50 MG: 50 TABLET, EXTENDED RELEASE ORAL at 09:10

## 2023-10-25 RX ADMIN — AMIODARONE HYDROCHLORIDE 400 MG: 200 TABLET ORAL at 09:10

## 2023-10-25 RX ADMIN — LEVOTHYROXINE SODIUM 50 MCG: 50 TABLET ORAL at 05:10

## 2023-10-25 RX ADMIN — TOBRAMYCIN AND DEXAMETHASONE 1 DROP: 3; 1 SUSPENSION/ DROPS OPHTHALMIC at 01:10

## 2023-10-25 RX ADMIN — TRAZODONE HYDROCHLORIDE 100 MG: 100 TABLET ORAL at 09:10

## 2023-10-25 RX ADMIN — DIVALPROEX SODIUM 500 MG: 500 TABLET, DELAYED RELEASE ORAL at 09:10

## 2023-10-25 RX ADMIN — RIVAROXABAN 15 MG: 15 TABLET, FILM COATED ORAL at 05:10

## 2023-10-25 RX ADMIN — TOBRAMYCIN AND DEXAMETHASONE 1 DROP: 3; 1 SUSPENSION/ DROPS OPHTHALMIC at 05:10

## 2023-10-25 RX ADMIN — AMIODARONE HYDROCHLORIDE 400 MG: 200 TABLET ORAL at 08:10

## 2023-10-25 RX ADMIN — SENNOSIDES AND DOCUSATE SODIUM 1 TABLET: 8.6; 5 TABLET ORAL at 09:10

## 2023-10-25 RX ADMIN — SERTRALINE HYDROCHLORIDE 50 MG: 50 TABLET ORAL at 09:10

## 2023-10-25 RX ADMIN — DIVALPROEX SODIUM 250 MG: 250 TABLET, DELAYED RELEASE ORAL at 08:10

## 2023-10-25 RX ADMIN — DIGOXIN 0.12 MG: 125 TABLET ORAL at 05:10

## 2023-10-25 RX ADMIN — TOBRAMYCIN AND DEXAMETHASONE 1 DROP: 3; 1 SUSPENSION/ DROPS OPHTHALMIC at 09:10

## 2023-10-25 NOTE — PROGRESS NOTES
[facility] Medicine  Progress Note    Patient Name: Alejandra Strong  MRN: [id number]  Patient Class: IP- Inpatient   Admission Date: 10/19/2023  Length of Stay: 5 days  Attending Physician: Debora White MD  Primary Care Provider: Mer, Primary Doctor        Subjective:     Principal Problem:Atrial fibrillation with rapid ventricular response        HPI:  Patient is a 63-year-old female who has  has a past medical history of Anticoagulant long-term use, Bipolar disorder unspecified, CHF (congestive heart failure) reduced ejection fraction, CKD 3 (chronic kidney disease), Diabetes mellitus type 2, Hypertension, Hypothyroidism, unspecified, and Paroxysmal atrial fibrillation.     Patient initially presented to [facility] with complaints of visual and auditory hallucinations.  She was noted to be in AFib/flutter with RVR.  She was admitted to the hospital under pec her rate was controlled with amiodarone, Toprol-XL 50 mg a day and she was discharged to [facility] for further treatment of her bipolar disorder.  She she was discharged 10/11/20[facility].  She is been seen by PCP and Psychiatry there until today when she was noted to have significant swelling in her legs and feet and sent under a pec/CEC for evaluation.  In the emergency department she was noted to be in AFib/flutter rate of 114.  She was also noted to have potassium of 2.7, calcium of 5.4 corrected to 7.2 and a BNP at 9:12 a.m. which is down from 1993 on October 10th.  She was given IV Lasix calcium and potassium in the emergency department.  Her initial troponin was negative.  Patient is seen lethargic but arousable.  Knows she is in the hospital, the date, as well as her birth date.  She denies any chest pain or shortness a breath.    She is admitted under a pec/CEC will need a sitter and will consult psych for help with her meds in a.m.      Overview/Hospital Course:  Patient  admitted cardiology and Psychiatry were consulted.  Patient was begun on amiodarone drip for control for AFib with RVR.  Meds were adjusted per Psychiatry.    Patient being seen by Cardiology.  She was started on amnio drip to attempted controlled rate.  Overnight her heart rate remained high and her blood pressure this morning was low cardiology will be evaluating her soon.    Seen by cardiology and low blood pressure was started on digoxin load for control of heart rate.  Reviewing her records seems that patient has had CKD for several years and likely we will need continued diuresis.    With loading dose of amiodarone, and digoxin patient's heart rate controlled.  Seen again by psychiatry who felt that adjust medications as appropriate but that she was no longer at risk in PEC/CPC was canceled.  Patient is weak and debilitated and therefore seen by PT and OT with recommendation for skilled nursing placement prior to returning home.  Patient would like to go to skilled nursing facility closer to her home near [place] therefore order was placed for social work to attempt to place her in a facility there.    Actively participate with physical therapy and occupational therapy with improved strength.      Interval History:  Patient seen in room seeing side of the bed working with therapy.  She has no complaints.    Review of Systems   Constitutional:  Positive for fatigue. Negative for fever.   Respiratory:  Negative for cough and shortness of breath.    Cardiovascular:  Negative for chest pain and leg swelling.   Gastrointestinal:  Negative for nausea and vomiting.   Neurological:  Positive for weakness.   All other systems reviewed and are negative.    Objective:     Vital Signs (Most Recent):  Temp: 96.7 °F (35.9 °C) (10/25/23 1206)  Pulse: 94 (10/25/23 1206)  Resp: 20 (10/25/23 1206)  BP: 113/79 (10/25/23 1206)  SpO2: 98 % (10/25/23 1206) Vital Signs (24h Range):  Temp:  [96.7 °F (35.9 °C)-98.5 °F (36.9 °C)]  "96.7 °F (35.9 °C)  Pulse:  [73-94] 94  Resp:  [16-20] 20  SpO2:  [91 %-98 %] 98 %  BP: (101-128)/(63-80) 113/79     Weight: 62 kg (136 lb 11 oz)  Body mass index is 24.21 kg/m².    Intake/Output Summary (Last 24 hours) at 10/25/2023 1447  Last data filed at 10/25/2023 0643  Gross per 24 hour   Intake --   Output 1000 ml   Net -1000 ml         Physical Exam  Vitals reviewed.   Constitutional:       Appearance: Normal appearance. She is ill-appearing.   HENT:      Head: Normocephalic and atraumatic.      Mouth/Throat:      Mouth: Mucous membranes are moist.      Pharynx: Oropharynx is clear.   Eyes:      Extraocular Movements: Extraocular movements intact.      Conjunctiva/sclera: Conjunctivae normal.   Cardiovascular:      Rate and Rhythm: Tachycardia present. Rhythm irregular.      Pulses: Normal pulses.      Heart sounds: Normal heart sounds.   Pulmonary:      Effort: Pulmonary effort is normal.      Breath sounds: Normal breath sounds. No wheezing or rales.   Abdominal:      General: Bowel sounds are normal. There is no distension.      Palpations: Abdomen is soft.      Tenderness: There is no abdominal tenderness.   Musculoskeletal:         General: Normal range of motion.      Cervical back: Normal range of motion and neck supple.   Skin:     General: Skin is warm and dry.   Neurological:      General: No focal deficit present.      Mental Status: She is alert and oriented to person, place, and time. Mental status is at baseline.   Psychiatric:         Behavior: Behavior normal.             Significant Labs: All pertinent labs within the past 24 hours have been reviewed.  CBC: No results for input(s): "WBC", "HGB", "HCT", "PLT" in the last 48 hours.  CMP:   Recent Labs   Lab 10/25/23  0435      K 3.6   CL 91*   CO2 31*      BUN 19   CREATININE 1.9*   CALCIUM 9.0   ALBUMIN 3.1*   ANIONGAP 16       Significant Imaging: I have reviewed all pertinent imaging results/findings within the past 24 " hours.      Assessment/Plan:      * Atrial fibrillation with rapid ventricular response  Patient with Long standing persistent (>12 months) atrial fibrillation which is uncontrolled currently with Beta Blocker and Amiodarone. Patient is currently in atrial fibrillation.[id number] Score: 1. HASBLED Score: . Anticoagulation indicated. Anticoagulation done with Xarelto     Continue outpatient amiodarone and metoprolol  Appreciate cardiology consult.      Discussing with Cardiology who recommended loading dose of due to at 0.25 mg q.6 hours x4 doses  Rate control.  Patient is remaining on amiodarone load and is on dig 0.125 mg daily, but worsening renal function we will change to 3 days a week      Stage 3b chronic kidney disease  In reviewing patient's records her creatinine has typically run somewhere between 2 and 2.3 at least as far back as 2018.  I suspect with her decreased cardiac function and volume overload that she is returning to her baseline function      Acute on chronic combined systolic and diastolic congestive heart failure  Patient is identified as having Combined Systolic and Diastolic heart failure that is Acute on chronic. CHF is currently uncontrolled due to Continued edema of extremities. Latest ECHO performed and demonstrates- Results for orders placed during the hospital encounter of 10/08/23    Echo    Interpretation Summary    Rhythm is atrial flutter.    Left Ventricle: The left ventricle is normal in size. Normal wall thickness. There is moderately reduced systolic function with a visually estimated ejection fraction of 35 - 40%.    Left Atrium: Left atrium is moderately dilated.    Right Ventricle: Mild right ventricular enlargement. Systolic function is mildly reduced.    Right Atrium: Right atrium is moderately dilated.    Aortic Valve: The aortic valve is a trileaflet valve. There is mild aortic valve sclerosis.    Mitral Valve: There is mild to moderate regurgitation.     Tricuspid Valve: There is moderate regurgitation.    Pulmonic Valve: There is mild regurgitation.    Pulmonary Artery: The estimated pulmonary artery systolic pressure is 51 mmHg.    IVC/SVC: Elevated venous pressure at 15 mmHg.    Pericardium: There is a trivial effusion.  . Continue Beta Blocker and Furosemide and monitor clinical status closely. Monitor on telemetry. Patient is off CHF pathway.  Monitor strict Is&Os and daily weights.  Place on fluid restriction of 1.5 L. Cardiology has not been any consulted. Continue to stress to patient importance of self efficacy and  on diet for CHF. Last BNP reviewed- and noted below   Recent Labs   Lab 10/23/23  0531   BNP 1,830*   .  Symptoms markedly improving.  Patient with minimal shortness of breath.  Continue with titration of diuretics.    Hypothyroidism  Patient currently on replacement dose we will continue and recheck in a.m.      Essential hypertension  Change back to Toprol-XL as blood pressure is now improved        Dyslipidemia  Continue patient's outpatient statin      Bipolar disorder  Patient admitted from [facility]  She has a diagnosis of bipolar just is under [facility] is reportedly homeless and there prior to admission had auditory and visual hallucinations currently she was hyperactive with delusions and unable to understand her situation or take her medications.  She was started on Depakote  mg b.i.d. Zoloft 100 mg daily trazodone 100 mg q.h.s. was just recently started on Risperdal 1 mg p.o. b.i.d. for visual hallucinations.  At the time they were considering nursing home versus group home placement    Appreciate telepsych consult meds were adjusted per their recommendations    Seen by telepsych again today 10/23 with recommendations of medication adjustments reviewed.  No longer needed CEC        VTE Risk Mitigation (From admission, onward)         Ordered     rivaroxaban tablet 15 mg  With dinner         10/20/23  0938     Reason for No Pharmacological VTE Prophylaxis  Once        Question:  Reasons:  Answer:  Already adequately anticoagulated on oral Anticoagulants    10/19/23 1750     IP VTE HIGH RISK PATIENT  Once         10/19/23 1750     Place sequential compression device  Until discontinued         10/19/23 1750                Discharge Planning   EBEN:      Code Status: Full Code   Is the patient medically ready for discharge?: No    Reason for patient still in hospital (select all that apply): Patient trending condition and Pending disposition  Discharge Plan A: [facility]                 Debora Trinh MD  Department of Hospital Medicine   [facility] [facility] ([facility])

## 2023-10-25 NOTE — PLAN OF CARE
Declines received from [facility], [facility], and [facility].    Additional referrals sent to [facility], [facility], [facility]    Contacted [facility].  Elsi is not in today.  Left message for [facility] admissions coordinator to call. Milargo returned phone call.  She did not receive referral. Re-sent via [employer].           10/25/23 7198   Post-Acute Status   Post-Acute Authorization Placement   Post-Acute Placement Status Referrals Sent   Discharge Plan   Discharge Plan A Skilled Nursing Facility

## 2023-10-25 NOTE — ASSESSMENT & PLAN NOTE
Patient with Long standing persistent (>12 months) atrial fibrillation which is uncontrolled currently with Beta Blocker and Amiodarone. Patient is currently in atrial fibrillation.[id number] Score: 1. HASBLED Score: . Anticoagulation indicated. Anticoagulation done with Xarelto     Continue outpatient amiodarone and metoprolol  Appreciate cardiology consult.      Discussing with Cardiology who recommended loading dose of due to at 0.25 mg q.6 hours x4 doses  Rate control.  Patient is remaining on amiodarone load and is on dig 0.125 mg daily, but worsening renal function we will change to 3 days a week

## 2023-10-25 NOTE — SUBJECTIVE & OBJECTIVE
Interval History:  Patient seen in room seeing side of the bed working with therapy.  She has no complaints.    Review of Systems   Constitutional:  Positive for fatigue. Negative for fever.   Respiratory:  Negative for cough and shortness of breath.    Cardiovascular:  Negative for chest pain and leg swelling.   Gastrointestinal:  Negative for nausea and vomiting.   Neurological:  Positive for weakness.   All other systems reviewed and are negative.    Objective:     Vital Signs (Most Recent):  Temp: 96.7 °F (35.9 °C) (10/25/23 1206)  Pulse: 94 (10/25/23 1206)  Resp: 20 (10/25/23 1206)  BP: 113/79 (10/25/23 1206)  SpO2: 98 % (10/25/23 1206) Vital Signs (24h Range):  Temp:  [96.7 °F (35.9 °C)-98.5 °F (36.9 °C)] 96.7 °F (35.9 °C)  Pulse:  [73-94] 94  Resp:  [16-20] 20  SpO2:  [91 %-98 %] 98 %  BP: (101-128)/(63-80) 113/79     Weight: 62 kg (136 lb 11 oz)  Body mass index is 24.21 kg/m².    Intake/Output Summary (Last 24 hours) at 10/25/2023 1447  Last data filed at 10/25/2023 0643  Gross per 24 hour   Intake --   Output 1000 ml   Net -1000 ml         Physical Exam  Vitals reviewed.   Constitutional:       Appearance: Normal appearance. She is ill-appearing.   HENT:      Head: Normocephalic and atraumatic.      Mouth/Throat:      Mouth: Mucous membranes are moist.      Pharynx: Oropharynx is clear.   Eyes:      Extraocular Movements: Extraocular movements intact.      Conjunctiva/sclera: Conjunctivae normal.   Cardiovascular:      Rate and Rhythm: Tachycardia present. Rhythm irregular.      Pulses: Normal pulses.      Heart sounds: Normal heart sounds.   Pulmonary:      Effort: Pulmonary effort is normal.      Breath sounds: Normal breath sounds. No wheezing or rales.   Abdominal:      General: Bowel sounds are normal. There is no distension.      Palpations: Abdomen is soft.      Tenderness: There is no abdominal tenderness.   Musculoskeletal:         General: Normal range of motion.      Cervical back: Normal range  "of motion and neck supple.   Skin:     General: Skin is warm and dry.   Neurological:      General: No focal deficit present.      Mental Status: She is alert and oriented to person, place, and time. Mental status is at baseline.   Psychiatric:         Behavior: Behavior normal.             Significant Labs: All pertinent labs within the past 24 hours have been reviewed.  CBC: No results for input(s): "WBC", "HGB", "HCT", "PLT" in the last 48 hours.  CMP:   Recent Labs   Lab 10/25/23  0435      K 3.6   CL 91*   CO2 31*      BUN 19   CREATININE 1.9*   CALCIUM 9.0   ALBUMIN 3.1*   ANIONGAP 16       Significant Imaging: I have reviewed all pertinent imaging results/findings within the past 24 hours.  "

## 2023-10-25 NOTE — PT/OT/SLP PROGRESS
Occupational Therapy   Treatment    Name: Alejandra Strong  MRN: [id number]  Admitting Diagnosis:  Atrial fibrillation with rapid ventricular response       Recommendations:     Discharge Recommendations: Moderate Intensity Therapy  Discharge Equipment Recommendations:  to be determined by next level of care  Barriers to discharge:  Decreased caregiver support (homelessness)    Assessment:     Alejandra Strong is a 63 y.o. female with a medical diagnosis of Atrial fibrillation with rapid ventricular response.  She presents with the following performance deficits affecting function are weakness, impaired endurance, impaired self care skills, impaired functional mobility, gait instability, impaired balance, decreased coordination, decreased lower extremity function, decreased safety awareness, impaired cardiopulmonary response to activity.     Rehab Prognosis:  Good; patient would benefit from acute skilled OT services to address these deficits and reach maximum level of function.       Plan:     Patient to be seen 2 x/week to address the above listed problems via self-care/home management, therapeutic activities, therapeutic exercises  Plan of Care Expires: 11/04/23  Plan of Care Reviewed with: patient    Subjective     Chief Complaint: none, pt reports she is doing better  Patient/Family Comments/goals: get better, return home  Pain/Comfort:  Pain Rating 1: 0/10    Objective:     Communicated with: Nurse and epic chart review prior to session.  Patient found supine with peripheral IV, telemetry, bed alarm upon OT entry to room.    General Precautions: Standard, fall    Orthopedic Precautions:N/A  Braces: N/A  Respiratory Status: Room air     Occupational Performance:     Bed Mobility:    Patient completed Rolling/Turning to Left with  contact guard assistance  Patient completed Scooting/Bridging with stand by assistance  Patient completed Supine to Sit with contact guard assistance     Functional  Mobility/Transfers:  Patient completed Sit <> Stand Transfer with contact guard assistance  with  rolling walker   Patient completed Bed <> Chair Transfer using Stand Pivot technique with minimum assistance with rolling walker  Functional Mobility: Patient completed x100ft x2 reps functional mobility with Min A and RW to increase dynamic standing balance and activity tolerance needed for ADL completion.     Activities of Daily Living:  Grooming: setup A. Combed hair, washed face, and rinsed mouth in chair.  Upper Body Dressing: minimum assistance chidi gown around back in standing.  Lower Body Dressing: maximal assistance doff/chidi brief in standing.  Toileting: maximal assistance standing at bedside, pt assisting with cleaning front.    [facility] 6 Click ADL: 17    Treatment & Education:  Pt requiring Min A to maintain standing balance while standing with RW, pt with posterior lean. Patient educated on role of OT in acute setting and benefits of participation. Educated on techniques to use to increase independence and decrease fall risk with functional transfers. Educated on importance of OOB activity and calling for A to transfer back to bed. Encouraged completion of B UE AROM therex throughout the day to tolerance to increase functional strength and activity tolerance. Educated patient on importance of increased tolerance to upright position and direct impact on CV endurance and strength. Patient encouraged to sit up in chair for a minimum of 2 consecutive hours per day.  Patient stated understanding and in agreement with POC.     Patient left up in chair with all lines intact, call button in reach, and chair alarm on    GOALS:   Multidisciplinary Problems       Occupational Therapy Goals          Problem: Occupational Therapy    Goal Priority Disciplines Outcome Interventions   Occupational Therapy Goal     OT, PT/OT Ongoing, Progressing    Description: LTG'S TO BE MET IN 14 DAYS (11/4/23)    1)  PATIENT WILL PERFORM  UB DRESSING WITH SBA DUE TO [facility]IENT [facility] WITH DECREASED CAREGIVER SUPPORT.    2)  PATIENT WILL PERFORM LB DRESSING WITH  SBA DUE TO PATIENT LIVES [facility] WITH DECREASED CAREGIVER SUPPORT.    3)  PATIENT WILL PERFORM TOILET T/F WITH  SBA DUE TO PATIENT LIVES ALONE [facility] WITH DECREASED CAREGIVER SUPPORT.    4)  PATIENT WILL PERFORM STANDING AT SINK X5-X10 MIN WITH (S) WITH RW IF NEEDED TO PERFORM SIMPLE GROOMING/HYGIENE WITH NO LOB.                         Time Tracking:     OT Date of Treatment: 10/25/23  OT Start Time: 1000  OT Stop Time: 1030  OT Total Time (min): 30 min    Billable Minutes:Self Care/Home Management 15  Therapeutic Activity 15    OT/JIE: JORGE Vance OT     10/25/2023

## 2023-10-25 NOTE — ASSESSMENT & PLAN NOTE
Patient admitted from [facility]  She has a diagnosis of bipolar just is under [facility] is reportedly homeless and there prior to admission had auditory and visual hallucinations currently she was hyperactive with delusions and unable to understand her situation or take her medications.  She was started on Depakote  mg b.i.d. Zoloft 100 mg daily trazodone 100 mg q.h.s. was just recently started on Risperdal 1 mg p.o. b.i.d. for visual hallucinations.  At the time they were considering nursing home versus group home placement    Appreciate telepsych consult meds were adjusted per their recommendations    Seen by telepsych again today 10/23 with recommendations of medication adjustments reviewed.  No longer needed [facility]

## 2023-10-25 NOTE — PLAN OF CARE
Problem: Adult Inpatient Plan of Care  Goal: Plan of Care Review  Outcome: Ongoing, Progressing  Flowsheets (Taken 10/25/2023 1817)  Plan of Care Reviewed With: patient     Problem: Skin Injury Risk Increased  Goal: Skin Health and Integrity  Outcome: Ongoing, Progressing  Intervention: Optimize Skin Protection  Flowsheets (Taken 10/25/2023 1817)  Pressure Reduction Techniques:   frequent weight shift encouraged   sit time limited to 2 hours   weight shift assistance provided  Head of Bed (HOB) Positioning: HOB elevated

## 2023-10-25 NOTE — PT/OT/SLP PROGRESS
"Physical Therapy  Treatment    Alejandra Strong   MRN: [id number]   Admitting Diagnosis: Atrial fibrillation with rapid ventricular response    PT Received On: 10/25/23  PT Start Time: 1000     PT Stop Time: 1030    PT Total Time (min): 30 min       Billable Minutes:  Gait Training 15 and Therapeutic Activity 15    Treatment Type: Treatment  PT/PTA: PTA     Number of PTA visits since last PT visit: 3       General Precautions: Standard, fall  Orthopedic Precautions: N/A  Braces: N/A  Respiratory Status: Room air         Subjective:  Communicated with patient's nurse, Armida, and completed Epic chart review prior to session.  Patient agreed to PT session.   "I've been getting up by myself. I'm doing better."    Pain/Comfort  Pain Rating 1: 0/10  Pain Rating Post-Intervention 1: 0/10    Objective:   Patient found with: bed alarm, telemetry, peripheral IV    Supine > sit EOB: CGA    Forward scoot towards EOB: SBA    STS from EOB > RW: CGA    Static stand x5 min total for brief change with BUE self support on RW  Min A to maintain standing balance    100ft x2 trials w/ RW Min A (intermittent VC for safety w/ RW mgmt; multiple instances of veering to R and running RW into obstacles; increased time to complete)    Stand pivot T/F to chair w/ RW: Min A (VC for safety w/ RW mgmt and sequencing of task)    Educated patient on importance of increased tolerance to upright position and direct impact on CV endurance and strength. Patient encouraged to sit up in chair/ EOB, for a minimum of 2 consecutive hours, 3x per day. Encouraged patient to perform AROM TE to BLE throughout the day within all available planes of motion. Re enforced importance of utilizing call light to meet needs in room and not attempt to get up without staff assistance. Patient verbalized understanding and agreed to comply.      AM-PAC 6 CLICK MOBILITY  How much help from another person does this patient currently need?   1 = Unable, Total/Dependent " Assistance  2 = A lot, Maximum/Moderate Assistance  3 = A little, Minimum/Contact Guard/Supervision  4 = None, Modified [place]/Independent    Turning over in bed (including adjusting bedclothes, sheets and blankets)?: 3  Sitting down on and standing up from a chair with arms (e.g., wheelchair, bedside commode, etc.): 3  Moving from lying on back to sitting on the side of the bed?: 3  Moving to and from a bed to a chair (including a wheelchair)?: 3  Need to walk in hospital room?: 3  Climbing 3-5 steps with a railing?: 1 (NT)  Basic Mobility Total Score: 16    AM-PAC Raw Score CMS G-Code Modifier Level of Impairment Assistance   6 % Total / Unable   7 - 9 CM 80 - 100% Maximal Assist   10 - 14 CL 60 - 80% Moderate Assist   15 - 19 CK 40 - 60% Moderate Assist   20 - 22 CJ 20 - 40% Minimal Assist   23 CI 1-20% SBA / CGA   24 CH 0% Independent/ Mod I     Patient left up in chair with call button in reach and chair alarm on.    Assessment:  Alejandra Strong is a 63 y.o. female with a medical diagnosis of Atrial fibrillation with rapid ventricular response and presents with overall decline in functional mobility. Patient would continue to benefit from skilled PT to address functional limitations listed below in order to return to PLOF/decrease caregiver burden. Patient is progressing well towards goals established within PT POC.     Rehab identified problem list/impairments: weakness, impaired endurance, impaired self care skills, impaired functional mobility, gait instability, impaired balance, decreased coordination, decreased safety awareness, impaired cardiopulmonary response to activity, decreased lower extremity function    Rehab potential is good.    Activity tolerance: Good    Discharge recommendations: Moderate Intensity Therapy      Barriers to discharge:      Equipment recommendations: to be determined by next level of care     GOALS:   Multidisciplinary Problems       Physical Therapy Goals           Problem: Physical Therapy    Goal Priority Disciplines Outcome Goal Variances Interventions   Physical Therapy Goal     PT, PT/OT Ongoing, Progressing     Description: All LTGs to be met by 11/4/23    Bed mobility I  Transfers I  Gait of at least 150' mod I   Pt will increase AMPAC score by 2 points to progress functional mobility  Pt will tolerate > 10 min of functional activity to progress gross functional mobility                          PLAN:    Patient to be seen 3 x/week to address the above listed problems via gait training, therapeutic activities, therapeutic exercises  Plan of Care expires: 11/04/23  Plan of Care reviewed with: patient         10/25/2023

## 2023-10-26 LAB
ALBUMIN SERPL BCP-MCNC: 3.3 G/DL (ref 3.5–5.2)
ANION GAP SERPL CALC-SCNC: 16 MMOL/L (ref 8–16)
ANISOCYTOSIS BLD QL SMEAR: SLIGHT
BASOPHILS # BLD AUTO: 0.08 K/UL (ref 0–0.2)
BASOPHILS NFR BLD: 1.4 % (ref 0–1.9)
BUN SERPL-MCNC: 24 MG/DL (ref 8–23)
CALCIUM SERPL-MCNC: 9.2 MG/DL (ref 8.7–10.5)
CHLORIDE SERPL-SCNC: 94 MMOL/L (ref 95–110)
CO2 SERPL-SCNC: 27 MMOL/L (ref 23–29)
CREAT SERPL-MCNC: 1.7 MG/DL (ref 0.5–1.4)
DIFFERENTIAL METHOD: ABNORMAL
EOSINOPHIL # BLD AUTO: 0.2 K/UL (ref 0–0.5)
EOSINOPHIL NFR BLD: 3.6 % (ref 0–8)
ERYTHROCYTE [DISTWIDTH] IN BLOOD BY AUTOMATED COUNT: 21.6 % (ref 11.5–14.5)
EST. GFR  (NO RACE VARIABLE): 33 ML/MIN/1.73 M^2
GLUCOSE SERPL-MCNC: 140 MG/DL (ref 70–110)
HCT VFR BLD AUTO: 37.2 % (ref 37–48.5)
HGB BLD-MCNC: 10.5 G/DL (ref 12–16)
IMM GRANULOCYTES # BLD AUTO: 0.07 K/UL (ref 0–0.04)
IMM GRANULOCYTES NFR BLD AUTO: 1.2 % (ref 0–0.5)
LYMPHOCYTES # BLD AUTO: 1.5 K/UL (ref 1–4.8)
LYMPHOCYTES NFR BLD: 26.3 % (ref 18–48)
MCH RBC QN AUTO: 24.5 PG (ref 27–31)
MCHC RBC AUTO-ENTMCNC: 28.2 G/DL (ref 32–36)
MCV RBC AUTO: 87 FL (ref 82–98)
MONOCYTES # BLD AUTO: 0.7 K/UL (ref 0.3–1)
MONOCYTES NFR BLD: 11.7 % (ref 4–15)
NEUTROPHILS # BLD AUTO: 3.2 K/UL (ref 1.8–7.7)
NEUTROPHILS NFR BLD: 55.8 % (ref 38–73)
NRBC BLD-RTO: 0 /100 WBC
OVALOCYTES BLD QL SMEAR: ABNORMAL
PHOSPHATE SERPL-MCNC: 3.7 MG/DL (ref 2.7–4.5)
PLATELET # BLD AUTO: 160 K/UL (ref 150–450)
PMV BLD AUTO: ABNORMAL FL (ref 9.2–12.9)
POTASSIUM SERPL-SCNC: 3.9 MMOL/L (ref 3.5–5.1)
RBC # BLD AUTO: 4.28 M/UL (ref 4–5.4)
SODIUM SERPL-SCNC: 137 MMOL/L (ref 136–145)
WBC # BLD AUTO: 5.79 K/UL (ref 3.9–12.7)

## 2023-10-26 PROCEDURE — 36415 COLL VENOUS BLD VENIPUNCTURE: CPT | Performed by: INTERNAL MEDICINE

## 2023-10-26 PROCEDURE — 25000003 PHARM REV CODE 250: Performed by: STUDENT IN AN ORGANIZED HEALTH CARE EDUCATION/TRAINING PROGRAM

## 2023-10-26 PROCEDURE — 97530 THERAPEUTIC ACTIVITIES: CPT

## 2023-10-26 PROCEDURE — 97116 GAIT TRAINING THERAPY: CPT

## 2023-10-26 PROCEDURE — 25000003 PHARM REV CODE 250: Performed by: INTERNAL MEDICINE

## 2023-10-26 PROCEDURE — 21400001 HC TELEMETRY ROOM

## 2023-10-26 PROCEDURE — 80069 RENAL FUNCTION PANEL: CPT | Performed by: INTERNAL MEDICINE

## 2023-10-26 PROCEDURE — 85025 COMPLETE CBC W/AUTO DIFF WBC: CPT | Performed by: INTERNAL MEDICINE

## 2023-10-26 PROCEDURE — 97535 SELF CARE MNGMENT TRAINING: CPT

## 2023-10-26 RX ADMIN — SERTRALINE HYDROCHLORIDE 50 MG: 50 TABLET ORAL at 08:10

## 2023-10-26 RX ADMIN — MELATONIN TAB 3 MG 3 MG: 3 TAB at 08:10

## 2023-10-26 RX ADMIN — AMIODARONE HYDROCHLORIDE 400 MG: 200 TABLET ORAL at 08:10

## 2023-10-26 RX ADMIN — LEVOTHYROXINE SODIUM 50 MCG: 50 TABLET ORAL at 05:10

## 2023-10-26 RX ADMIN — TOBRAMYCIN AND DEXAMETHASONE 1 DROP: 3; 1 SUSPENSION/ DROPS OPHTHALMIC at 02:10

## 2023-10-26 RX ADMIN — TOBRAMYCIN AND DEXAMETHASONE 1 DROP: 3; 1 SUSPENSION/ DROPS OPHTHALMIC at 10:10

## 2023-10-26 RX ADMIN — TOBRAMYCIN AND DEXAMETHASONE 1 DROP: 3; 1 SUSPENSION/ DROPS OPHTHALMIC at 05:10

## 2023-10-26 RX ADMIN — SENNOSIDES AND DOCUSATE SODIUM 1 TABLET: 8.6; 5 TABLET ORAL at 08:10

## 2023-10-26 RX ADMIN — DIVALPROEX SODIUM 250 MG: 250 TABLET, DELAYED RELEASE ORAL at 08:10

## 2023-10-26 RX ADMIN — TRAZODONE HYDROCHLORIDE 100 MG: 100 TABLET ORAL at 08:10

## 2023-10-26 RX ADMIN — RIVAROXABAN 15 MG: 15 TABLET, FILM COATED ORAL at 05:10

## 2023-10-26 RX ADMIN — DIVALPROEX SODIUM 500 MG: 500 TABLET, DELAYED RELEASE ORAL at 08:10

## 2023-10-26 RX ADMIN — TOBRAMYCIN AND DEXAMETHASONE 1 DROP: 3; 1 SUSPENSION/ DROPS OPHTHALMIC at 12:10

## 2023-10-26 RX ADMIN — METOPROLOL SUCCINATE 50 MG: 50 TABLET, EXTENDED RELEASE ORAL at 08:10

## 2023-10-26 RX ADMIN — TORSEMIDE 40 MG: 10 TABLET ORAL at 09:10

## 2023-10-26 RX ADMIN — TOBRAMYCIN AND DEXAMETHASONE 1 DROP: 3; 1 SUSPENSION/ DROPS OPHTHALMIC at 09:10

## 2023-10-26 RX ADMIN — ATORVASTATIN CALCIUM 20 MG: 10 TABLET, FILM COATED ORAL at 08:10

## 2023-10-26 NOTE — SUBJECTIVE & OBJECTIVE
Interval History:  Patient is feeling well today with no complaints.  She is able to participate in therapy.  She is tolerating her diet.    Review of Systems   Constitutional:  Negative for fatigue and fever.   Respiratory:  Negative for cough and shortness of breath.    Cardiovascular:  Negative for chest pain and leg swelling.   Gastrointestinal:  Negative for nausea and vomiting.   Neurological:  Positive for weakness.   All other systems reviewed and are negative.    Objective:     Vital Signs (Most Recent):  Temp: 97.5 °F (36.4 °C) (10/26/23 1147)  Pulse: 68 (10/26/23 1310)  Resp: 20 (10/26/23 1147)  BP: (!) 149/88 (10/26/23 1147)  SpO2: 98 % (10/26/23 1147) Vital Signs (24h Range):  Temp:  [97.4 °F (36.3 °C)-98.1 °F (36.7 °C)] 97.5 °F (36.4 °C)  Pulse:  [55-96] 68  Resp:  [18-20] 20  SpO2:  [93 %-98 %] 98 %  BP: (111-149)/(63-88) 149/88     Weight: 62 kg (136 lb 11 oz)  Body mass index is 24.21 kg/m².    Intake/Output Summary (Last 24 hours) at 10/26/2023 1601  Last data filed at 10/26/2023 1517  Gross per 24 hour   Intake 480 ml   Output 2000 ml   Net -1520 ml         Physical Exam  Vitals reviewed.   Constitutional:       Appearance: Normal appearance.   HENT:      Head: Normocephalic and atraumatic.      Mouth/Throat:      Mouth: Mucous membranes are moist.      Pharynx: Oropharynx is clear.   Eyes:      Extraocular Movements: Extraocular movements intact.      Conjunctiva/sclera: Conjunctivae normal.   Cardiovascular:      Rate and Rhythm: Normal rate and regular rhythm.      Pulses: Normal pulses.      Heart sounds: Normal heart sounds.   Pulmonary:      Effort: Pulmonary effort is normal.      Breath sounds: Normal breath sounds.   Abdominal:      General: Bowel sounds are normal.      Palpations: Abdomen is soft.   Musculoskeletal:         General: Normal range of motion.      Cervical back: Normal range of motion and neck supple.   Skin:     General: Skin is warm and dry.   Neurological:      General:  No focal deficit present.      Mental Status: She is alert and oriented to person, place, and time. Mental status is at baseline.   Psychiatric:         Mood and Affect: Mood normal.         Behavior: Behavior normal.         Thought Content: Thought content normal.             Significant Labs: All pertinent labs within the past 24 hours have been reviewed.  CBC:   Recent Labs   Lab 10/26/23  0438   WBC 5.79   HGB 10.5*   HCT 37.2        CMP:   Recent Labs   Lab 10/25/23  0435 10/26/23  1224    137   K 3.6 3.9   CL 91* 94*   CO2 31* 27    140*   BUN 19 24*   CREATININE 1.9* 1.7*   CALCIUM 9.0 9.2   ALBUMIN 3.1* 3.3*   ANIONGAP 16 16       Significant Imaging: I have reviewed all pertinent imaging results/findings within the past 24 hours.

## 2023-10-26 NOTE — PLAN OF CARE
Received call from Abiola at [facility].  Clinically accepted the patient.  Need to get financials before submitting for authorization.    Currently unable to obtain financial information.  Will reconsider acceptance if financial information obtained.      4:00pm Spoke to Ro, friend/landlord.  Number to sister given.  [phone].  CM will follow up tomorrow.

## 2023-10-26 NOTE — PT/OT/SLP PROGRESS
"Occupational Therapy   Treatment    Name: Alejandra Strong  MRN: [id number]  Admitting Diagnosis:  Atrial fibrillation with rapid ventricular response       Recommendations:     Discharge Recommendations: Moderate Intensity Therapy  Discharge Equipment Recommendations:  to be determined by next level of care  Barriers to discharge:  Decreased caregiver support (Pt is homeless)    Assessment:     Alejandra Strong is a 63 y.o. female with a medical diagnosis of Atrial fibrillation with rapid ventricular response.  She presents with self care debility. Performance deficits affecting function are impaired endurance, impaired self care skills, weakness, impaired functional mobility, gait instability, impaired balance, visual deficits, decreased upper extremity function, decreased lower extremity function, decreased safety awareness, decreased ROM, impaired cardiopulmonary response to activity.     Rehab Prognosis:  Good; patient would benefit from acute skilled OT services to address these deficits and reach maximum level of function.       Plan:     Patient to be seen 2 x/week to address the above listed problems via self-care/home management, therapeutic exercises, therapeutic activities  Plan of Care Expires: 11/04/23  Plan of Care Reviewed with: patient    Subjective     Chief Complaint: None - eager to participate  Patient/Family Comments/goals: Pt stated "I slept good last night"  Pain/Comfort:  Pain Rating 1: 0/10  Pain Rating Post-Intervention 1: 0/10    Objective:     Communicated with: pt's nurse, Armida, and completed a chart review via Epic prior to session.  Patient found HOB elevated with telemetry, bed alarm, PureWick, peripheral IV upon OT entry to room.    General Precautions: Standard, fall    Orthopedic Precautions:N/A  Braces: N/A  Respiratory Status: Room air     Occupational Performance:   Bed Mobility:    Patient completed Rolling/Turning to Right with contact guard assistance  Patient " completed Scooting/Bridging with contact guard assistance  Patient completed Supine to Sit with contact guard assistance     Functional Mobility/Transfers:  Patient completed Sit > Stand Transfer from EOB with contact guard assistance  with  rolling walker   Functional Mobility: Pt ambulated 100 ft x 2 with CGA with a RW for increase activity tolerance for ADL completion.   Patient completed a stand>sit  transfer  to chair with CGA with RW.    Activities of Daily Living:  Grooming: Pt completed simple oral hygiene with mouthwash while standing at sink in restroom with CGA.   Lower Body Dressing: Pt completed donning brief while supine in bed with min  A x 2    AMPAC 6 Click ADL: 17    Treatment & Education:  Pt tolerated session well. Pt is pleasant and engaging throughout. Pt educated on role of OT in acute care and benefits to participation. Pt educated on benefits of OOB therex and activities  to promote recovery. Pt acknowledges POC. Pt educated on BUE ROM Therex  in 3 planes to complete throughout the day as tolerated. Pt educated on call dont fall policy. Pt encouraged to use call button to meet needs.    Patient left up in chair with all lines intact, call button in reach, and chair alarm on    GOALS:   Multidisciplinary Problems       Occupational Therapy Goals          Problem: Occupational Therapy    Goal Priority Disciplines Outcome Interventions   Occupational Therapy Goal     OT, PT/OT Ongoing, Progressing    Description: LTG'S TO BE MET IN 14 DAYS (11/4/23)    1)  PATIENT WILL PERFORM UB DRESSING WITH SBA DUE TO PATIENT LIVES ALONE IN [facility] WITH DECREASED CAREGIVER SUPPORT.    2)  PATIENT WILL PERFORM LB DRESSING WITH  SBA DUE TO PATIENT LIVES ALONE IN [facility] WITH DECREASED CAREGIVER SUPPORT.    3)  PATIENT WILL PERFORM TOILET T/F WITH  SBA DUE TO PATIENT LIVES ALONE IN [facility] WITH DECREASED CAREGIVER SUPPORT.    4)  PATIENT WILL PERFORM STANDING AT SINK X5-X10 MIN WITH (S)  WITH RW IF NEEDED TO PERFORM SIMPLE GROOMING/HYGIENE WITH NO LOB.                         Time Tracking:     OT Date of Treatment: 10/26/23  OT Start Time: 0820  OT Stop Time: 0843  OT Total Time (min): 23 min    Billable Minutes:Self Care/Home Management 10  Therapeutic Activity 13    OT/JIE: OT      SHIVAM Delacruz  10/26/2023

## 2023-10-26 NOTE — PLAN OF CARE
GOOD PARTICIPATION TODAY. PT PROGRESSING WITH FUNCTIONAL MOBILITY AND GAIT. PT REQUIRES CGA FOR BED MOBILITY AND SIT<>STAND TFS. PT  FT X 2 TRIALS WITH CGA AND RW. P.T. RECOMMENDS MODERATE INTENSITY THERAPY AT DISCHARGE.

## 2023-10-26 NOTE — PLAN OF CARE
Pt tolerated session fair. All bed mobility, transfers, and ambulation (100 ft  x 2 with RW) CGA.    ADL: Pt completed simple oral hygiene with mouthwash while standing at sink in restroom with CGA. Pt  completed donning brief while supine in bed with min  A x 2.    D/C Rec: Moderate Intensity Therapy.

## 2023-10-26 NOTE — PLAN OF CARE
Problem: Adult Inpatient Plan of Care  Goal: Plan of Care Review  Outcome: Ongoing, Progressing  Flowsheets (Taken 10/26/2023 1743)  Plan of Care Reviewed With: patient     Problem: Adult Inpatient Plan of Care  Goal: Optimal Comfort and Wellbeing  Outcome: Ongoing, Progressing  Intervention: Provide Person-Centered Care  Flowsheets (Taken 10/26/2023 1743)  Trust Relationship/Rapport:   care explained   choices provided   emotional support provided   empathic listening provided   questions answered   questions encouraged   reassurance provided   thoughts/feelings acknowledged     Problem: Skin Injury Risk Increased  Goal: Skin Health and Integrity  Outcome: Ongoing, Progressing  Intervention: Optimize Skin Protection  Flowsheets (Taken 10/26/2023 1743)  Pressure Reduction Techniques:   frequent weight shift encouraged   weight shift assistance provided   sit time limited to 2 hours  Head of Bed (HOB) Positioning: HOB elevated

## 2023-10-26 NOTE — PT/OT/SLP PROGRESS
"Physical Therapy Treatment    Patient Name:  Alejandra Strong   MRN:  [id number]    Recommendations:     Discharge Recommendations: Moderate Intensity Therapy  Discharge Equipment Recommendations: to be determined by next level of care  Barriers to discharge: Decreased caregiver support     Assessment:     Alejandra Strong is a 63 y.o. female admitted with a medical diagnosis of Atrial fibrillation with rapid ventricular response.  She presents with the following impairments/functional limitations: weakness, impaired endurance, impaired self care skills, impaired functional mobility, gait instability, impaired balance, decreased coordination, decreased upper extremity function, decreased lower extremity function, decreased safety awareness, impaired cardiopulmonary response to activity.    Rehab Prognosis: Good; patient would benefit from acute skilled PT services to address these deficits and reach maximum level of function.    Recent Surgery: * No surgery found *      Plan:     During this hospitalization, patient to be seen 3 x/week to address the identified rehab impairments via gait training, therapeutic activities, therapeutic exercises and progress toward the following goals:    Plan of Care Expires:  11/04/23    Subjective     Chief Complaint: NONE  Patient/Family Comments/goals: "I'M READY TO WALK"  Pain/Comfort:  Pain Rating 1: 0/10      Objective:     Communicated with NURSE BRAIN prior to session.  Patient found supine with peripheral IV, telemetry, PureWick, bed alarm upon PT entry to room.     General Precautions: Standard, fall  Orthopedic Precautions: N/A  Braces: N/A  Respiratory Status: Room air     Functional Mobility:  Bed Mobility:     Rolling Left:  contact guard assistance  Scooting: contact guard assistance  Bridging: contact guard assistance; PT PERFORMED SUPINE BRIDGING WITH CGA IN ORDER TO CHANGE SOILED BRIEFS.   Supine to Sit: contact guard assistance   Transfers:     Sit to Stand:  " "contact guard assistance with rolling walker; VCS REQUIRED FOR HAND PLACEMENT   Stand to Sit: contact guard assistance with rolling walker; VCS REQUIRED FOR HAND PLACEMENT  Gait: PT  FT X 2 TRIALS WITH CGA AND RW AND REQUIRED ONE STANDING REST BREAK DUE TO DECONDITIONING. PT AMB WITH DECREASED STEP LENGTH, GAIT SPEED, AND WITH THE RIGHT LE EXTERNALLY ROTATED. PT DEMONSTRATES POOR CONTROL OF THE RW AND UNAWARENESS OF OBSTACLE ON HER RIGHT SIDE AS EVIDENCED BY FREQUENT BUMPING INTO OBJECTS ON THE RIGHT. PT ALSO AMB WITH RIGHT LATERAL LISTING THAT PT WAS ABLE TO CORRECT AFTER CUEING BUT NOT ABLE TO MAINTAIN.   Balance: GOOD STATIC SITTING BALANCE EOB; FAIR DYNAMIC STANDING BALANCE DURING AMB; FAIR STATIC STANDING BALANCE AT SINK; PT PERFORMED 5 MIN STANDING BALANCE AT SINK WITH CGA IN ORDER TO PERFORM ORAL AND HAND HYGIENE;     AM-PAC 6 CLICK MOBILITY  Turning over in bed (including adjusting bedclothes, sheets and blankets)?: 3  Sitting down on and standing up from a chair with arms (e.g., wheelchair, bedside commode, etc.): 3  Moving from lying on back to sitting on the side of the bed?: 3  Moving to and from a bed to a chair (including a wheelchair)?: 3  Need to walk in hospital room?: 3  Climbing 3-5 steps with a railing?: 1 (NT)  Basic Mobility Total Score: 16     Treatment & Education:  PT EDUCATED ON P.T. POC AND ROLE OF PT IN THE ACUTE CARE SETTING.   PT EDUCATED ON BENEFITS OF UPRIGHT POSITION AND ENCOURAGED TO REMAIN IN SEATED POSITION FOR AS LONG AS TOLERATED.   PT EDUCATED ON "CALL DON'T FALL" POLICY AND INSTRUCTED TO USE CALL BUTTON WHEN WANTING TO CHANGE POSITIONS.   PT EDUCATED ON IMPORTANCE OF PHYSICAL ACTIVITY AND GIVEN THERAPEUTIC EXERCISE TO PERFORM THROUGHOUT THE DAY (SEATED MARCHES, LAQS, ANKLE PUMPS, 10 REPS BILATERALLY EACH)   PT EDUCATED ON RW SAFETY AND INSTRUCTIONS DURING TF'S AND GAIT.  PT PERFORMED THERAPEUTIC EXERCISE PROGRAM IN SITTING CONSISTING OF SEATED MARCHES, LAQS, AND ANKLE " PUMPS (10 REPS EACH).     Patient left up in chair with all lines intact, call button in reach, and chair alarm on..    GOALS:   Multidisciplinary Problems       Physical Therapy Goals          Problem: Physical Therapy    Goal Priority Disciplines Outcome Goal Variances Interventions   Physical Therapy Goal     PT, PT/OT Ongoing, Progressing     Description: All LTGs to be met by 11/4/23    Bed mobility I  Transfers I  Gait of at least 150' mod I   Pt will increase AMPAC score by 2 points to progress functional mobility  Pt will tolerate > 10 min of functional activity to progress gross functional mobility                          Time Tracking:     PT Received On: 10/26/23  PT Start Time: 0810     PT Stop Time: 0835  PT Total Time (min): 25 min     Billable Minutes: Gait Training 10 and Therapeutic Activity 15    Treatment Type: Treatment  PT/PTA: PT     Number of PTA visits since last PT visit: 0     Marta Wagner, SPT   10/26/2023

## 2023-10-26 NOTE — PROGRESS NOTES
[facility] Medicine  Progress Note    Patient Name: Alejandra Strong  MRN: [id number]  Patient Class: IP- Inpatient   Admission Date: 10/19/2023  Length of Stay: 6 days  Attending Physician: Debora White MD  Primary Care Provider: Mer, Primary Doctor        Subjective:     Principal Problem:Atrial fibrillation with rapid ventricular response        HPI:  Patient is a 63-year-old female who has  has a past medical history of Anticoagulant long-term use, Bipolar disorder unspecified, CHF (congestive heart failure) reduced ejection fraction, CKD 3 (chronic kidney disease), Diabetes mellitus type 2, Hypertension, Hypothyroidism, unspecified, and Paroxysmal atrial fibrillation.     Patient initially presented to [facility] with complaints of visual and auditory hallucinations.  She was noted to be in AFib/flutter with RVR.  She was admitted to the hospital under pec her rate was controlled with amiodarone, Toprol-XL 50 mg a day and she was discharged to [facility] for further treatment of her bipolar disorder.  She she was discharged 10/11/20[facility] to [facility].  She is been seen by PCP and Psychiatry there until today when she was noted to have significant swelling in her legs and feet and sent under a pec/CEC for evaluation.  In the emergency department she was noted to be in AFib/flutter rate of 114.  She was also noted to have potassium of 2.7, calcium of 5.4 corrected to 7.2 and a BNP at 9:12 a.m. which is down from 1993 on October 10th.  She was given IV Lasix calcium and potassium in the emergency department.  Her initial troponin was negative.  Patient is seen lethargic but arousable.  Knows she is in the hospital, the date, as well as her birth date.  She denies any chest pain or shortness a breath.    She is admitted under a pec/CEC will need a sitter and will consult psych for help with her meds in a.m.      Overview/Hospital Course:  Patient  admitted cardiology and Psychiatry were consulted.  Patient was begun on amiodarone drip for control for AFib with RVR.  Meds were adjusted per Psychiatry.    Patient being seen by Cardiology.  She was started on amnio drip to attempted controlled rate.  Overnight her heart rate remained high and her blood pressure this morning was low cardiology will be evaluating her soon.    Seen by cardiology and low blood pressure was started on digoxin load for control of heart rate.  Reviewing her records seems that patient has had CKD for several years and likely we will need continued diuresis.    With loading dose of amiodarone, and digoxin patient's heart rate controlled.  Seen again by psychiatry who felt that adjust medications as appropriate but that she was no longer at risk in PEC/CPC was canceled.  Patient is weak and debilitated and therefore seen by PT and OT with recommendation for skilled nursing placement prior to returning home.  Patient would like to go to skilled nursing facility closer to her home near [place] therefore order was placed for social work to attempt to place her in a facility there.    Actively participate with physical therapy and occupational therapy with improved strength.      Interval History:  Patient is feeling well today with no complaints.  She is able to participate in therapy.  She is tolerating her diet.    Review of Systems   Constitutional:  Negative for fatigue and fever.   Respiratory:  Negative for cough and shortness of breath.    Cardiovascular:  Negative for chest pain and leg swelling.   Gastrointestinal:  Negative for nausea and vomiting.   Neurological:  Positive for weakness.   All other systems reviewed and are negative.    Objective:     Vital Signs (Most Recent):  Temp: 97.5 °F (36.4 °C) (10/26/23 1147)  Pulse: 68 (10/26/23 1310)  Resp: 20 (10/26/23 1147)  BP: (!) 149/88 (10/26/23 1147)  SpO2: 98 % (10/26/23 1147) Vital Signs (24h Range):  Temp:  [97.4 °F (36.3  °C)-98.1 °F (36.7 °C)] 97.5 °F (36.4 °C)  Pulse:  [55-96] 68  Resp:  [18-20] 20  SpO2:  [93 %-98 %] 98 %  BP: (111-149)/(63-88) 149/88     Weight: 62 kg (136 lb 11 oz)  Body mass index is 24.21 kg/m².    Intake/Output Summary (Last 24 hours) at 10/26/2023 1601  Last data filed at 10/26/2023 1517  Gross per 24 hour   Intake 480 ml   Output 2000 ml   Net -1520 ml         Physical Exam  Vitals reviewed.   Constitutional:       Appearance: Normal appearance.   HENT:      Head: Normocephalic and atraumatic.      Mouth/Throat:      Mouth: Mucous membranes are moist.      Pharynx: Oropharynx is clear.   Eyes:      Extraocular Movements: Extraocular movements intact.      Conjunctiva/sclera: Conjunctivae normal.   Cardiovascular:      Rate and Rhythm: Normal rate and regular rhythm.      Pulses: Normal pulses.      Heart sounds: Normal heart sounds.   Pulmonary:      Effort: Pulmonary effort is normal.      Breath sounds: Normal breath sounds.   Abdominal:      General: Bowel sounds are normal.      Palpations: Abdomen is soft.   Musculoskeletal:         General: Normal range of motion.      Cervical back: Normal range of motion and neck supple.   Skin:     General: Skin is warm and dry.   Neurological:      General: No focal deficit present.      Mental Status: She is alert and oriented to person, place, and time. Mental status is at baseline.   Psychiatric:         Mood and Affect: Mood normal.         Behavior: Behavior normal.         Thought Content: Thought content normal.             Significant Labs: All pertinent labs within the past 24 hours have been reviewed.  CBC:   Recent Labs   Lab 10/26/23  0438   WBC 5.79   HGB 10.5*   HCT 37.2        CMP:   Recent Labs   Lab 10/25/23  0435 10/26/23  1224    137   K 3.6 3.9   CL 91* 94*   CO2 31* 27    140*   BUN 19 24*   CREATININE 1.9* 1.7*   CALCIUM 9.0 9.2   ALBUMIN 3.1* 3.3*   ANIONGAP 16 16       Significant Imaging: I have reviewed all pertinent  imaging results/findings within the past 24 hours.      Assessment/Plan:      * Atrial fibrillation with rapid ventricular response  Patient with Long standing persistent (>12 months) atrial fibrillation which is uncontrolled currently with Beta Blocker and Amiodarone. Patient is currently in atrial fibrillation.[id number] Score: 1. HASBLED Score: . Anticoagulation indicated. Anticoagulation done with Xarelto     Continue outpatient amiodarone and metoprolol  Appreciate cardiology consult.      Discussing with Cardiology who recommended loading dose of due to at 0.25 mg q.6 hours x4 doses  Rate control.  Patient is remaining on amiodarone load and is on dig 0.125 mg daily, but worsening renal function we will change to 3 days a week      Stage 3b chronic kidney disease  In reviewing patient's records her creatinine has typically run somewhere between 2 and 2.3 at least as far back as 2018.  I suspect with her decreased cardiac function and volume overload that she is returning to her baseline function      Acute on chronic combined systolic and diastolic congestive heart failure  Patient is identified as having Combined Systolic and Diastolic heart failure that is Acute on chronic. CHF is currently uncontrolled due to Continued edema of extremities. Latest ECHO performed and demonstrates- Results for orders placed during the hospital encounter of 10/08/23    Echo    Interpretation Summary    Rhythm is atrial flutter.    Left Ventricle: The left ventricle is normal in size. Normal wall thickness. There is moderately reduced systolic function with a visually estimated ejection fraction of 35 - 40%.    Left Atrium: Left atrium is moderately dilated.    Right Ventricle: Mild right ventricular enlargement. Systolic function is mildly reduced.    Right Atrium: Right atrium is moderately dilated.    Aortic Valve: The aortic valve is a trileaflet valve. There is mild aortic valve sclerosis.    Mitral Valve: There is  mild to moderate regurgitation.    Tricuspid Valve: There is moderate regurgitation.    Pulmonic Valve: There is mild regurgitation.    Pulmonary Artery: The estimated pulmonary artery systolic pressure is 51 mmHg.    IVC/SVC: Elevated venous pressure at 15 mmHg.    Pericardium: There is a trivial effusion.  . Continue Beta Blocker and Furosemide and monitor clinical status closely. Monitor on telemetry. Patient is off CHF pathway.  Monitor strict Is&Os and daily weights.  Place on fluid restriction of 1.5 L. Cardiology has not been any consulted. Continue to stress to patient importance of self efficacy and  on diet for CHF. Last BNP reviewed- and noted below   Recent Labs   Lab 10/23/23  0531   BNP 1,830*   .  Symptoms markedly improving.  Patient with minimal shortness of breath.  Continue with titration of diuretics.    Hypothyroidism  Patient currently on replacement dose we will continue and recheck in a.m.      Essential hypertension  Change back to Toprol-XL as blood pressure is now improved        Dyslipidemia  Continue patient's outpatient statin      Bipolar disorder  Patient admitted from [facility]  [facility] a [facility] and [facility] prior to admission had auditory and visual hallucinations currently she was hyperactive with delusions and unable to understand her situation or take her medications.  She was started on Depakote  mg b.i.d. Zoloft 100 mg daily trazodone 100 mg q.h.s. was just recently started on Risperdal 1 mg p.o. b.i.d. for visual hallucinations.  At the time they were considering nursing home versus group home placement    Appreciate telepsych consult meds were adjusted per their recommendations    Seen by telepsych again today 10/23 with recommendations of medication adjustments reviewed.  No longer needed CEC        VTE Risk Mitigation (From admission, onward)         Ordered     rivaroxaban tablet  15 mg  With dinner         10/20/23 0938     Reason for No Pharmacological VTE Prophylaxis  Once        Question:  Reasons:  Answer:  Already adequately anticoagulated on oral Anticoagulants    10/19/23 1750     IP VTE HIGH RISK PATIENT  Once         10/19/23 1750     Place sequential compression device  Until discontinued         10/19/23 1750                Discharge Planning   EBEN:      Code Status: Full Code   Is the patient medically ready for discharge?: No    Reason for patient still in hospital (select all that apply): Pending disposition  Discharge Plan A: (P) Skilled Nursing Facility                  Debora Trinh MD  Department of Hospital Medicine   '[facility] - Telemetry ([facility])

## 2023-10-26 NOTE — ASSESSMENT & PLAN NOTE
Patient with Long standing persistent (>12 months) atrial fibrillation which is uncontrolled currently with Beta Blocker and Amiodarone. Patient is currently in atrial fibrillation.[id number] Score: 1. HASBLED Score: . Anticoagulation indicated. Anticoagulation done with Xarelto     Continue outpatient amiodarone and metoprolol  Appreciate cardiology consult.      Discussing with Cardiology who recommended loading dose of due to at 0.25 mg q.6 hours x4 doses  Rate control.  Patient is remaining on amiodarone load and is on dig 0.125 mg daily, but worsening renal function we will change to 3 days a week

## 2023-10-27 LAB
BNP SERPL-MCNC: 180 PG/ML (ref 0–99)
DIGOXIN SERPL-MCNC: 1.1 NG/ML (ref 0.8–2)

## 2023-10-27 PROCEDURE — 80162 ASSAY OF DIGOXIN TOTAL: CPT | Performed by: INTERNAL MEDICINE

## 2023-10-27 PROCEDURE — 97530 THERAPEUTIC ACTIVITIES: CPT

## 2023-10-27 PROCEDURE — 97116 GAIT TRAINING THERAPY: CPT

## 2023-10-27 PROCEDURE — 25000003 PHARM REV CODE 250: Performed by: STUDENT IN AN ORGANIZED HEALTH CARE EDUCATION/TRAINING PROGRAM

## 2023-10-27 PROCEDURE — 25000003 PHARM REV CODE 250: Performed by: INTERNAL MEDICINE

## 2023-10-27 PROCEDURE — 21400001 HC TELEMETRY ROOM

## 2023-10-27 PROCEDURE — 83880 ASSAY OF NATRIURETIC PEPTIDE: CPT | Performed by: INTERNAL MEDICINE

## 2023-10-27 PROCEDURE — 36415 COLL VENOUS BLD VENIPUNCTURE: CPT | Performed by: INTERNAL MEDICINE

## 2023-10-27 RX ADMIN — METOPROLOL SUCCINATE 50 MG: 50 TABLET, EXTENDED RELEASE ORAL at 08:10

## 2023-10-27 RX ADMIN — TOBRAMYCIN AND DEXAMETHASONE 1 DROP: 3; 1 SUSPENSION/ DROPS OPHTHALMIC at 09:10

## 2023-10-27 RX ADMIN — DIVALPROEX SODIUM 500 MG: 500 TABLET, DELAYED RELEASE ORAL at 09:10

## 2023-10-27 RX ADMIN — TRAZODONE HYDROCHLORIDE 100 MG: 100 TABLET ORAL at 09:10

## 2023-10-27 RX ADMIN — TOBRAMYCIN AND DEXAMETHASONE 1 DROP: 3; 1 SUSPENSION/ DROPS OPHTHALMIC at 05:10

## 2023-10-27 RX ADMIN — AMIODARONE HYDROCHLORIDE 400 MG: 200 TABLET ORAL at 09:10

## 2023-10-27 RX ADMIN — LEVOTHYROXINE SODIUM 50 MCG: 50 TABLET ORAL at 06:10

## 2023-10-27 RX ADMIN — SERTRALINE HYDROCHLORIDE 50 MG: 50 TABLET ORAL at 08:10

## 2023-10-27 RX ADMIN — RIVAROXABAN 15 MG: 15 TABLET, FILM COATED ORAL at 04:10

## 2023-10-27 RX ADMIN — AMIODARONE HYDROCHLORIDE 400 MG: 200 TABLET ORAL at 08:10

## 2023-10-27 RX ADMIN — TOBRAMYCIN AND DEXAMETHASONE 1 DROP: 3; 1 SUSPENSION/ DROPS OPHTHALMIC at 01:10

## 2023-10-27 RX ADMIN — DIGOXIN 0.12 MG: 125 TABLET ORAL at 04:10

## 2023-10-27 RX ADMIN — ATORVASTATIN CALCIUM 20 MG: 10 TABLET, FILM COATED ORAL at 08:10

## 2023-10-27 RX ADMIN — TORSEMIDE 40 MG: 10 TABLET ORAL at 08:10

## 2023-10-27 RX ADMIN — MELATONIN TAB 3 MG 3 MG: 3 TAB at 09:10

## 2023-10-27 RX ADMIN — DIVALPROEX SODIUM 250 MG: 250 TABLET, DELAYED RELEASE ORAL at 08:10

## 2023-10-27 NOTE — PLAN OF CARE
GOOD PARTICIPATION TODAY. PT DEMONSTRATING MORE DIFFICULTY WITH AMB TODAY AND REQUIRED MULTIPLE REST BREAKS. PT REQUIRES CGA FOR BED MOBILITY AND SIT<>STAND TFS. PT AMB 60 FT X 3 TRIALS WITH PATRICK AND RW. P.T. RECOMMENDS MODERATE INTENSITY THERAPY AT DISCHARGE.

## 2023-10-27 NOTE — PLAN OF CARE
Problem: Adult Inpatient Plan of Care  Goal: Plan of Care Review  Outcome: Ongoing, Progressing  Flowsheets (Taken 10/27/2023 1553)  Plan of Care Reviewed With: patient     Problem: Fall Injury Risk  Goal: Absence of Fall and Fall-Related Injury  Outcome: Ongoing, Progressing  Intervention: Identify and Manage Contributors  Flowsheets (Taken 10/27/2023 1553)  Self-Care Promotion:   BADL personal objects within reach   BADL personal routines maintained   meal set-up provided  Medication Review/Management:   medications reviewed   high-risk medications identified

## 2023-10-27 NOTE — SUBJECTIVE & OBJECTIVE
Interval History:  Patient awake and alert without complaints.  She is been doing coloring and word find puzzles.  Anxious to participate in therapy and return home.    Review of Systems   Constitutional:  Positive for fatigue. Negative for fever.   Respiratory:  Negative for cough and shortness of breath.    Cardiovascular:  Negative for chest pain and leg swelling.   Gastrointestinal:  Negative for nausea and vomiting.   Genitourinary:  Positive for urgency.   Neurological:  Positive for weakness.   All other systems reviewed and are negative.    Objective:     Vital Signs (Most Recent):  Temp: 97.5 °F (36.4 °C) (10/27/23 1533)  Pulse: 72 (10/27/23 1533)  Resp: 16 (10/27/23 1533)  BP: 119/79 (10/27/23 1533)  SpO2: 95 % (10/27/23 1533) Vital Signs (24h Range):  Temp:  [97.5 °F (36.4 °C)-98.8 °F (37.1 °C)] 97.5 °F (36.4 °C)  Pulse:  [65-92] 72  Resp:  [14-18] 16  SpO2:  [91 %-99 %] 95 %  BP: (100-121)/(64-81) 119/79     Weight: 55 kg (121 lb 4.1 oz)  Body mass index is 21.48 kg/m².    Intake/Output Summary (Last 24 hours) at 10/27/2023 1817  Last data filed at 10/27/2023 1410  Gross per 24 hour   Intake --   Output 1250 ml   Net -1250 ml         Physical Exam  Vitals reviewed.   Constitutional:       Appearance: Normal appearance.   HENT:      Head: Normocephalic and atraumatic.      Mouth/Throat:      Mouth: Mucous membranes are moist.      Pharynx: Oropharynx is clear.   Eyes:      Extraocular Movements: Extraocular movements intact.      Conjunctiva/sclera: Conjunctivae normal.   Cardiovascular:      Rate and Rhythm: Normal rate and regular rhythm.      Pulses: Normal pulses.      Heart sounds: Normal heart sounds.   Pulmonary:      Effort: Pulmonary effort is normal.      Breath sounds: Normal breath sounds.   Abdominal:      General: Bowel sounds are normal.      Palpations: Abdomen is soft.   Musculoskeletal:         General: Normal range of motion.      Cervical back: Normal range of motion and neck supple.    Skin:     General: Skin is warm and dry.   Neurological:      General: No focal deficit present.      Mental Status: She is alert and oriented to person, place, and time. Mental status is at baseline.             Significant Labs: All pertinent labs within the past 24 hours have been reviewed.  CBC:   Recent Labs   Lab 10/26/23  0438   WBC 5.79   HGB 10.5*   HCT 37.2        CMP:   Recent Labs   Lab 10/26/23  1224      K 3.9   CL 94*   CO2 27   *   BUN 24*   CREATININE 1.7*   CALCIUM 9.2   ALBUMIN 3.3*   ANIONGAP 16       Significant Imaging: I have reviewed all pertinent imaging results/findings within the past 24 hours.

## 2023-10-27 NOTE — PT/OT/SLP PROGRESS
Occupational Therapy   Treatment    Name: Alejandra Strong  MRN: [id number]  Admitting Diagnosis:  Atrial fibrillation with rapid ventricular response       Recommendations:     Discharge Recommendations: Moderate Intensity Therapy  Discharge Equipment Recommendations:  to be determined by next level of care  Barriers to discharge:  Decreased caregiver support    Assessment:     Alejandra Strong is a 63 y.o. female with a medical diagnosis of Atrial fibrillation with rapid ventricular response.  Performance deficits affecting function are weakness, gait instability, decreased upper extremity function, impaired endurance, impaired balance, decreased lower extremity function, impaired self care skills, impaired functional mobility, decreased safety awareness, impaired cardiopulmonary response to activity.     Rehab Prognosis:  Good; patient would benefit from acute skilled OT services to address these deficits and reach maximum level of function.       Plan:     Patient to be seen 2 x/week to address the above listed problems via self-care/home management, therapeutic activities, therapeutic exercises  Plan of Care Expires: 11/04/23  Plan of Care Reviewed with: patient    Subjective     Chief Complaint: None stated  Patient/Family Comments/goals: return to independent living   Pain/Comfort:  Pain Rating 1: 0/10  Pain Rating Post-Intervention 1: 0/10    Objective:     Communicated with: nurse prior to session.  Patient found up in chair with peripheral IV, telemetry, bed alarm upon OT entry to room.    General Precautions: Standard, fall    Orthopedic Precautions:N/A  Braces: N/A  Respiratory Status: Room air     Occupational Performance:     Functional Mobility/Transfers:  Patient completed Sit <> Stand Transfer with contact guard assistance  with  hand-held assist   Functional Mobility: Pt stood from bedside chair with CGA, slightly impulsive. Requires safety cues     Activities of Daily Living:  Grooming: set up  combing hair       [facility] 6 Click ADL: 17    Treatment & Education:  Pt performed BUE exercises in all planes to increase functional strength/endurance needed for daily activities. Pt left seated up in chair with all needs met. Educated on performing these exercises throughout the day to increase functional strength. Pt also encouraged to increase time spent OOB to increase functional activity tolerance. Pt verbalized understanding.     Patient left up in chair with all lines intact, call button in reach, chair alarm on, and nurse notified    GOALS:   Multidisciplinary Problems       Occupational Therapy Goals          Problem: Occupational Therapy    Goal Priority Disciplines Outcome Interventions   Occupational Therapy Goal     OT, PT/OT Ongoing, Progressing    Description: LTG'S TO BE MET IN 14 DAYS (11/4/23)    1)  PATIENT WILL PERFORM UB DRESSING WITH SBA DUE TO PATIENT LIVES ALONE IN [facility] longterm WITH DECREASED CAREGIVER SUPPORT.    2)  PATIENT WILL PERFORM LB DRESSING WITH  SBA DUE TO PATIENT LIVES ALONE IN [facility]OM[facility] longterm WITH DECREASED CAREGIVER SUPPORT.    3)  PATIENT WILL PERFORM TOILET T/F WITH  SBA DUE TO PATIENT LIVES ALONE IN [facility]OM[facility] longterm WITH DECREASED CAREGIVER SUPPORT.    4)  PATIENT WILL PERFORM STANDING AT SINK X5-X10 MIN WITH (S) WITH RW IF NEEDED TO PERFORM SIMPLE GROOMING/HYGIENE WITH NO LOB.                         Time Tracking:     OT Date of Treatment: 10/27/23  OT Start Time: 0855  OT Stop Time: 0905  OT Total Time (min): 10 min    Billable Minutes:Therapeutic Activity 0    LEIGH Urbina  OT/JIE: OT          10/27/2023

## 2023-10-27 NOTE — PT/OT/SLP PROGRESS
Physical Therapy Treatment    Patient Name:  Alejandra Strong   MRN:  [id number]    Recommendations:     Discharge Recommendations: Moderate Intensity Therapy  Discharge Equipment Recommendations: to be determined by next level of care  Barriers to discharge: None    Assessment:     Alejandra Strong is a 63 y.o. female admitted with a medical diagnosis of Atrial fibrillation with rapid ventricular response.  She presents with the following impairments/functional limitations: weakness, impaired endurance, impaired self care skills, impaired functional mobility, gait instability, impaired balance, decreased upper extremity function, decreased lower extremity function, decreased safety awareness, impaired cardiopulmonary response to activity.    Rehab Prognosis: Good; patient would benefit from acute skilled PT services to address these deficits and reach maximum level of function.    Recent Surgery: * No surgery found *      Plan:     During this hospitalization, patient to be seen 3 x/week to address the identified rehab impairments via gait training, therapeutic activities, therapeutic exercises and progress toward the following goals:    Plan of Care Expires:  11/04/23    Subjective     Chief Complaint: NONE  Patient/Family Comments/goals: PT IS WANTING TO GO HOME  Pain/Comfort:  Pain Rating 1: 0/10      Objective:     Communicated with NURSE BRAIN prior to session.  Patient found supine with peripheral IV, PureWick, BP cuff, telemetry, bed alarm upon PT entry to room.     General Precautions: Standard, fall  Orthopedic Precautions: N/A  Braces: N/A  Respiratory Status: Room air    Cognition: PT PRESENTS MILDLY DISTRACTIBLE THROUGHOUT SESSION; VCS GIVEN TO DIRECT PT FOCUS     Functional Mobility:  Bed Mobility:     Rolling Left:  contact guard assistance  Scooting: contact guard assistance  Bridging: stand by assistance; PT ABLE TO PERFORM SUPINE BRIDGE IN ORDER TO DON/DOFF BRIEFS   Supine to Sit: contact  "guard assistance  Transfers:     Sit to Stand:  contact guard assistance with rolling walker  Gait: PT AMB 60 FT X 3 TRIALS WITH PATRICK AND RW. PT REQUIRED 1 STANDING REST BREAK AND 1 SITTING REST BREAK DUE TO PT FEELING FATIGUED. PT CONTINUES TO AMB WITH DECREASED CONTROL OF RW AND UNAWARENESS OF OBSTACLE ON THE RIGHT. PT AMB WITH INCREASED UNSTEADINESS AS COMPARED TO LAST SESSION ON 10/26/23. PT AMB WITH POSTERIOR LEAN THROUGHOUT AMBULATION THAT INCREASED AS THE PT BEGAN TO FATIGUE. PT HAD ONE LOB THAT REQUIRED PATRICK FROM THE THERAPIST TO CORRECT. PT WAS IN A-FIB THROUGHOUT SESSION WITH HR AROUND 103 BPM DURING AMB AND HR AT 87 BPM AFTER SITTING RECOVERY.   Balance: GOOD STATIC SITTING BALANCE EOB; POOR DYNAMIC STANDING BALANCE DURING AMB;     AM-PAC 6 CLICK MOBILITY  Turning over in bed (including adjusting bedclothes, sheets and blankets)?: 3  Sitting down on and standing up from a chair with arms (e.g., wheelchair, bedside commode, etc.): 3  Moving from lying on back to sitting on the side of the bed?: 3  Moving to and from a bed to a chair (including a wheelchair)?: 3  Need to walk in hospital room?: 3  Climbing 3-5 steps with a railing?: 1 (NT)  Basic Mobility Total Score: 16     Treatment & Education:  PT EDUCATED ON P.T. POC AND ROLE OF PT IN THE ACUTE CARE SETTING.   PT EDUCATED ON BENEFITS OF UPRIGHT POSITION AND ENCOURAGED TO REMAIN IN SEATED POSITION FOR AS LONG AS TOLERATED.   PT EDUCATED ON "CALL DON'T FALL" POLICY AND INSTRUCTED TO USE CALL BUTTON WHEN WANTING TO CHANGE POSITIONS.   PT EDUCATED ON IMPORTANCE OF PHYSICAL ACTIVITY AND GIVEN THERAPEUTIC EXERCISE TO PERFORM THROUGHOUT THE DAY (SEATED MARCHES, LAQS, ANKLE PUMPS, 10 REPS BILATERALLY EACH)   PT EDUCATED ON RW SAFETY AND INSTRUCTIONS DURING TF'S AND GAIT.  PT PERFORMED THERAPEUTIC EXERCISE PROGRAM IN SITTING CONSISTING OF SEATED MARCHES, LAQS, AND ANKLE PUMPS (5 REPS EACH TO SHOW UNDERSTANDING).     Patient left up in chair with all lines " intact, call button in reach, and chair alarm on..    GOALS:   Multidisciplinary Problems       Physical Therapy Goals          Problem: Physical Therapy    Goal Priority Disciplines Outcome Goal Variances Interventions   Physical Therapy Goal     PT, PT/OT Ongoing, Progressing     Description: All LTGs to be met by 11/4/23    Bed mobility I  Transfers I  Gait of at least 150' mod I   Pt will increase AMPAC score by 2 points to progress functional mobility  Pt will tolerate > 10 min of functional activity to progress gross functional mobility                          Time Tracking:     PT Received On: 10/27/23  PT Start Time: 0805     PT Stop Time: 0830  PT Total Time (min): 25 min     Billable Minutes: Gait Training 10 and Therapeutic Activity 15    Treatment Type: Treatment  PT/PTA: PT     Number of PTA visits since last PT visit: 0     Marta Wagner SPT  10/27/2023

## 2023-10-27 NOTE — PT/OT/SLP PROGRESS
Physical Therapy Treatment    Patient Name:  Alejandra Strong   MRN:  [id number]    Recommendations:     Discharge Recommendations: Moderate Intensity Therapy  Discharge Equipment Recommendations: to be determined by next level of care  Barriers to discharge: None    Assessment:     Alejandra Strong is a 63 y.o. female admitted with a medical diagnosis of Atrial fibrillation with rapid ventricular response.  She presents with the following impairments/functional limitations: weakness, impaired endurance, impaired self care skills, impaired functional mobility, gait instability, impaired balance, decreased upper extremity function, decreased lower extremity function, decreased safety awareness, impaired cardiopulmonary response to activity.    Rehab Prognosis: Good; patient would benefit from acute skilled PT services to address these deficits and reach maximum level of function.    Recent Surgery: * No surgery found *      Plan:     During this hospitalization, patient to be seen 3 x/week to address the identified rehab impairments via gait training, therapeutic activities, therapeutic exercises and progress toward the following goals:    Plan of Care Expires:  11/04/23    Subjective     Chief Complaint: NONE  Patient/Family Comments/goals: NONE  Pain/Comfort:  Pain Rating 1: 0/10      Objective:     Communicated with NURSE DE LA PAZ prior to session.  Patient found up in chair with peripheral IV, telemetry, bed alarm upon PT entry to room.     General Precautions: Standard, fall  Orthopedic Precautions: N/A  Braces: N/A  Respiratory Status: Room air     Functional Mobility:  Transfers:     Sit to Stand:  contact guard assistance with no AD;   Stand to Sit: contact guard assistance with no AD    AM-PAC 6 CLICK MOBILITY  Turning over in bed (including adjusting bedclothes, sheets and blankets)?: 3  Sitting down on and standing up from a chair with arms (e.g., wheelchair, bedside commode, etc.): 3  Moving from lying  on back to sitting on the side of the bed?: 3  Moving to and from a bed to a chair (including a wheelchair)?: 3  Need to walk in hospital room?: 3  Climbing 3-5 steps with a railing?: 1 (NT)  Basic Mobility Total Score: 16       Treatment & Education:  PT EDUCATED ON IMPORTANCE OF STRENGTHENING PELVIC FLOOR IN ORDER TO MANAGE URINARY INCONTINENCE.   PT EDUCATED ON EXERCISES TO STRENGTHEN PELVIC FLOOR INCLUDING PELVIC FLOOR ACTIVATION WITH SEATED MARCHES AND INSTRUCTED TO PERFORM THROUGHOUT THE DAY.   PT EDUCATED TO ACTIVATE PELVIC FLOOR PRIOR TO SNEEZING/COUGHING TO MANAGE URINARY INCONTINENCE.   PT EDUCATED TO ACTIVATE AND RELAX PELVIC FLOOR IN SEQUENCE WHENEVER FEELING THE URGE TO URINATE AND MAKING WAY TO THE BATHROOM.   PT PERFORMED 10 REPETITIONS OF PELVIC FLOOR ACTIVATION WITH SEATED MARCHES.     Patient left up in chair with all lines intact, call button in reach, and chair alarm on..    GOALS:   Multidisciplinary Problems       Physical Therapy Goals          Problem: Physical Therapy    Goal Priority Disciplines Outcome Goal Variances Interventions   Physical Therapy Goal     PT, PT/OT Ongoing, Progressing     Description: All LTGs to be met by 11/4/23    Bed mobility I  Transfers I  Gait of at least 150' mod I   Pt will increase AMPAC score by 2 points to progress functional mobility  Pt will tolerate > 10 min of functional activity to progress gross functional mobility                          Time Tracking:     PT Received On: 10/27/23  PT Start Time: 0935     PT Stop Time: 0945  PT Total Time (min): 10 min     Billable Minutes: Therapeutic Activity 10    Treatment Type: Treatment  PT/PTA: PT     Number of PTA visits since last PT visit: 0     SOUMYA Jean Baptiste  10/27/2023

## 2023-10-27 NOTE — PLAN OF CARE
Spoke with Felicia ([place] - [phone]), patient's sister.  She states patient needs to to into a nursing home.  There is no family member that is able to care for the patient.  Felicia is taking care of her down' syndrome brother.  She is willing to sign any paperwork necessary to get her into a facility.  She does not have access to her financial information.  Phone number to Sandra Devi [place] - [phone]    Spoke to Sandra Devi.  Patient had lived in one of her homes for 7 years and patient managed her own finances.  She advised that she dealt with [employer] in [place] and they know the patient.      Contacted [employer] in [place] and was able to get verbal consent from patient to obtain 3 months of Bank statements.  Statements received via fax.    Spoke to Abiola at [facility] and faxed bank statements.  She is reviewing with administration to get approval to accept patient under longterm care.    4:00pm No return phone call from [facility] after leaving multiple messages.  CM will follow up on Monday.

## 2023-10-27 NOTE — PLAN OF CARE
Pt performed UE exercises while seated up in chair. Transferred min/CGA EOB>bedside chair. Provided pt with comb to comb hair.   Rec moderate intensity therapy at [facility]

## 2023-10-28 PROCEDURE — 25000003 PHARM REV CODE 250: Performed by: INTERNAL MEDICINE

## 2023-10-28 PROCEDURE — 25000003 PHARM REV CODE 250: Performed by: STUDENT IN AN ORGANIZED HEALTH CARE EDUCATION/TRAINING PROGRAM

## 2023-10-28 PROCEDURE — 97116 GAIT TRAINING THERAPY: CPT | Mod: CQ

## 2023-10-28 PROCEDURE — 21400001 HC TELEMETRY ROOM

## 2023-10-28 PROCEDURE — 97530 THERAPEUTIC ACTIVITIES: CPT | Mod: CQ

## 2023-10-28 RX ADMIN — TOBRAMYCIN AND DEXAMETHASONE 1 DROP: 3; 1 SUSPENSION/ DROPS OPHTHALMIC at 09:10

## 2023-10-28 RX ADMIN — DIVALPROEX SODIUM 250 MG: 250 TABLET, DELAYED RELEASE ORAL at 09:10

## 2023-10-28 RX ADMIN — ATORVASTATIN CALCIUM 20 MG: 10 TABLET, FILM COATED ORAL at 09:10

## 2023-10-28 RX ADMIN — DIVALPROEX SODIUM 500 MG: 500 TABLET, DELAYED RELEASE ORAL at 08:10

## 2023-10-28 RX ADMIN — TRAZODONE HYDROCHLORIDE 100 MG: 100 TABLET ORAL at 08:10

## 2023-10-28 RX ADMIN — AMIODARONE HYDROCHLORIDE 400 MG: 200 TABLET ORAL at 08:10

## 2023-10-28 RX ADMIN — SENNOSIDES AND DOCUSATE SODIUM 1 TABLET: 8.6; 5 TABLET ORAL at 09:10

## 2023-10-28 RX ADMIN — METOPROLOL SUCCINATE 50 MG: 50 TABLET, EXTENDED RELEASE ORAL at 09:10

## 2023-10-28 RX ADMIN — MELATONIN TAB 3 MG 3 MG: 3 TAB at 08:10

## 2023-10-28 RX ADMIN — TORSEMIDE 40 MG: 10 TABLET ORAL at 09:10

## 2023-10-28 RX ADMIN — RIVAROXABAN 15 MG: 15 TABLET, FILM COATED ORAL at 05:10

## 2023-10-28 RX ADMIN — LEVOTHYROXINE SODIUM 50 MCG: 50 TABLET ORAL at 06:10

## 2023-10-28 RX ADMIN — TOBRAMYCIN AND DEXAMETHASONE 1 DROP: 3; 1 SUSPENSION/ DROPS OPHTHALMIC at 06:10

## 2023-10-28 RX ADMIN — POLYETHYLENE GLYCOL 3350 17 G: 17 POWDER, FOR SOLUTION ORAL at 09:10

## 2023-10-28 RX ADMIN — SERTRALINE HYDROCHLORIDE 50 MG: 50 TABLET ORAL at 09:10

## 2023-10-28 RX ADMIN — AMIODARONE HYDROCHLORIDE 400 MG: 200 TABLET ORAL at 09:10

## 2023-10-28 NOTE — ASSESSMENT & PLAN NOTE
Patient with Long standing persistent (>12 months) atrial fibrillation which is uncontrolled currently with Beta Blocker and Amiodarone. Patient is currently in atrial fibrillation.[id number] Score: 1. HASBLED Score: . Anticoagulation indicated. Anticoagulation done with Xarelto     Continue outpatient amiodarone and metoprolol  Appreciate cardiology consult.      Discussing with Cardiology who recommended loading dose of dig of 0.25 mg q.6 hours x4 doses  Rate control.  Patient is remaining on amiodarone load and is on dig 0.125 mg daily, but worsening renal function we will change to 3 days a week

## 2023-10-28 NOTE — PLAN OF CARE
Discussed poc with pt, pt verbalized understanding, reinforcement needed.     Purposeful rounding every 2hours     VS wnl  Cardiac monitoring in use,  tele monitor # [id number]  Fall precautions in place, remains injury free  Pt denies c/o n/v and pain      Bed locked at lowest position  Call light within reach     Chart check complete  Will cont with POC      Problem: Adult Inpatient Plan of Care  Goal: Plan of Care Review  Outcome: Ongoing, Not Progressing  Goal: Patient-Specific Goal (Individualized)  Outcome: Ongoing, Not Progressing  Goal: Absence of Hospital-Acquired Illness or Injury  Outcome: Ongoing, Not Progressing  Goal: Optimal Comfort and Wellbeing  Outcome: Ongoing, Not Progressing  Goal: Readiness for Transition of Care  Outcome: Ongoing, Not Progressing     Problem: Fall Injury Risk  Goal: Absence of Fall and Fall-Related Injury  Outcome: Ongoing, Not Progressing

## 2023-10-28 NOTE — PLAN OF CARE
BED MOB SBA    SIT<-->SUPINE SBA VC FOR UPPER EXTREMITY PLACEMENT    SIT<-->STAND SBA VC FOR UPPER EXTREMITY PLACEMENT    RW 2 X 70' GT TRG WITH CG WITH PATIENT REPEATEDLY HITTING OBJECTS ON HER R SIDE IN SPITE OF CUES TO AVOID OBJECTS    PATIENT DEMONSTRATED DECREASED SAFETY WITH FAST TRANSITIONAL MOVEMENTS AND WITH HER BUMPING INTO OBJECTS ON HER R SIDE WHILE WALKING.     EDUCATED ON CALL DON'T FALL PROCEDURE

## 2023-10-28 NOTE — ASSESSMENT & PLAN NOTE
Patient admitted from [facility]  She has a diagnosis of bipolar just is under [facility] is reportedly homeless and there prior to admission had auditory and visual hallucinations currently she was hyperactive with delusions and unable to understand her situation or take her medications.  She was started on Depakote  mg b.i.d. Zoloft 100 mg daily trazodone 100 mg q.h.s. was just recently started on Risperdal 1 mg p.o. b.i.d. for visual hallucinations.  At the time they were considering nursing home versus group home placement    Appreciate telepsych consult meds were adjusted per their recommendations    Seen by telepsych again today 10/23 with recommendations of medication adjustments reviewed.  No longer needed [facility]

## 2023-10-28 NOTE — ASSESSMENT & PLAN NOTE
Patient is identified as having Combined Systolic and Diastolic heart failure that is Acute on chronic. CHF is currently uncontrolled due to Continued edema of extremities. Latest ECHO performed and demonstrates- Results for orders placed during the hospital encounter of 10/08/23    Echo    Interpretation Summary    Rhythm is atrial flutter.    Left Ventricle: The left ventricle is normal in size. Normal wall thickness. There is moderately reduced systolic function with a visually estimated ejection fraction of 35 - 40%.    Left Atrium: Left atrium is moderately dilated.    Right Ventricle: Mild right ventricular enlargement. Systolic function is mildly reduced.    Right Atrium: Right atrium is moderately dilated.    Aortic Valve: The aortic valve is a trileaflet valve. There is mild aortic valve sclerosis.    Mitral Valve: There is mild to moderate regurgitation.    Tricuspid Valve: There is moderate regurgitation.    Pulmonic Valve: There is mild regurgitation.    Pulmonary Artery: The estimated pulmonary artery systolic pressure is 51 mmHg.    IVC/SVC: Elevated venous pressure at 15 mmHg.    Pericardium: There is a trivial effusion.  . Continue Beta Blocker and Furosemide and monitor clinical status closely. Monitor on telemetry. Patient is off CHF pathway.  Monitor strict Is&Os and daily weights.  Place on fluid restriction of 1.5 L. Cardiology has not been any consulted. Continue to stress to patient importance of self efficacy and  on diet for CHF. Last BNP reviewed- and noted below   Recent Labs   Lab 10/27/23  0433   *   .  Symptoms markedly improving.  Patient with minimal shortness of breath.  Continue with titration of diuretics.

## 2023-10-28 NOTE — PROGRESS NOTES
[facility] Medicine  Progress Note    Patient Name: Alejandra Strong  MRN: [id number]  Patient Class: IP- Inpatient   Admission Date: 10/19/2023  Length of Stay: 8 days  Attending Physician: Debora White MD  Primary Care Provider: Mer, Primary Doctor        Subjective:     Principal Problem:Atrial fibrillation with rapid ventricular response        HPI:  Patient is a 63-year-old female who has  has a past medical history of Anticoagulant long-term use, Bipolar disorder unspecified, CHF (congestive heart failure) reduced ejection fraction, CKD 3 (chronic kidney disease), Diabetes mellitus type 2, Hypertension, Hypothyroidism, unspecified, and Paroxysmal atrial fibrillation.     Patient initially presented to [facility] with complaints of visual and auditory hallucinations.  She was noted to be in AFib/flutter with RVR.  She was admitted to the hospital under pec her rate was controlled with amiodarone, Toprol-XL 50 mg a day and she was discharged to [facility] for further treatment of her bipolar disorder.  She she was discharged 10/11/2023 to [facility].  She is been seen by PCP and Psychiatry there until today when she was noted to have significant swelling in her legs and feet and sent under a pec/CEC for evaluation.  In the emergency department she was noted to be in AFib/flutter rate of 114.  She was also noted to have potassium of 2.7, calcium of 5.4 corrected to 7.2 and a BNP at 9:12 a.m. which is down from 1993 on October 10th.  She was given IV Lasix calcium and potassium in the emergency department.  Her initial troponin was negative.  Patient is seen lethargic but arousable.  Knows she is in the hospital, the date, as well as her birth date.  She denies any chest pain or shortness a breath.    She is admitted under a pec/CEC will need a sitter and will consult psych for help with her meds in a.m.      Overview/Hospital Course:  Patient  admitted cardiology and Psychiatry were consulted.  Patient was begun on amiodarone drip for control for AFib with RVR.  Meds were adjusted per Psychiatry.    Patient being seen by Cardiology.  She was started on amnio drip to attempted controlled rate.  Overnight her heart rate remained high and her blood pressure this morning was low cardiology will be evaluating her soon.    Seen by cardiology and low blood pressure was started on digoxin load for control of heart rate.  Reviewing her records seems that patient has had CKD for several years and likely we will need continued diuresis.    With loading dose of amiodarone, and digoxin patient's heart rate controlled.  Seen again by psychiatry who felt that adjust medications as appropriate but that she was no longer at risk in PEC/CPC was canceled.  Patient is weak and debilitated and therefore seen by PT and OT with recommendation for skilled nursing placement prior to returning home.  Patient would like to go to skilled nursing facility closer to her home near [place] therefore order was placed for social work to attempt to place her in a facility there.    Actively participate with physical therapy and occupational therapy with improved strength.  Patient reports she is working well with therapy and would like to return closer to home.      Interval History:  Patient awake and alert without complaints.  She is been doing coloring and word find puzzles.  Anxious to participate in therapy and return home.    Review of Systems   Constitutional:  Positive for fatigue. Negative for fever.   Respiratory:  Negative for cough and shortness of breath.    Cardiovascular:  Negative for chest pain and leg swelling.   Gastrointestinal:  Negative for nausea and vomiting.   Genitourinary:  Positive for urgency.   Neurological:  Positive for weakness.   All other systems reviewed and are negative.    Objective:     Vital Signs (Most Recent):  Temp: 97.5 °F (36.4 °C) (10/27/23  1533)  Pulse: 72 (10/27/23 1533)  Resp: 16 (10/27/23 1533)  BP: 119/79 (10/27/23 1533)  SpO2: 95 % (10/27/23 1533) Vital Signs (24h Range):  Temp:  [97.5 °F (36.4 °C)-98.8 °F (37.1 °C)] 97.5 °F (36.4 °C)  Pulse:  [65-92] 72  Resp:  [14-18] 16  SpO2:  [91 %-99 %] 95 %  BP: (100-121)/(64-81) 119/79     Weight: 55 kg (121 lb 4.1 oz)  Body mass index is 21.48 kg/m².    Intake/Output Summary (Last 24 hours) at 10/27/2023 1817  Last data filed at 10/27/2023 1410  Gross per 24 hour   Intake --   Output 1250 ml   Net -1250 ml         Physical Exam  Vitals reviewed.   Constitutional:       Appearance: Normal appearance.   HENT:      Head: Normocephalic and atraumatic.      Mouth/Throat:      Mouth: Mucous membranes are moist.      Pharynx: Oropharynx is clear.   Eyes:      Extraocular Movements: Extraocular movements intact.      Conjunctiva/sclera: Conjunctivae normal.   Cardiovascular:      Rate and Rhythm: Normal rate and regular rhythm.      Pulses: Normal pulses.      Heart sounds: Normal heart sounds.   Pulmonary:      Effort: Pulmonary effort is normal.      Breath sounds: Normal breath sounds.   Abdominal:      General: Bowel sounds are normal.      Palpations: Abdomen is soft.   Musculoskeletal:         General: Normal range of motion.      Cervical back: Normal range of motion and neck supple.   Skin:     General: Skin is warm and dry.   Neurological:      General: No focal deficit present.      Mental Status: She is alert and oriented to person, place, and time. Mental status is at baseline.             Significant Labs: All pertinent labs within the past 24 hours have been reviewed.  CBC:   Recent Labs   Lab 10/26/23  0438   WBC 5.79   HGB 10.5*   HCT 37.2        CMP:   Recent Labs   Lab 10/26/23  1224      K 3.9   CL 94*   CO2 27   *   BUN 24*   CREATININE 1.7*   CALCIUM 9.2   ALBUMIN 3.3*   ANIONGAP 16       Significant Imaging: I have reviewed all pertinent imaging results/findings within  the past 24 hours.      Assessment/Plan:      * Atrial fibrillation with rapid ventricular response  Patient with Long standing persistent (>12 months) atrial fibrillation which is uncontrolled currently with Beta Blocker and Amiodarone. Patient is currently in atrial fibrillation.[id number] Score: 1. HASBLED Score: . Anticoagulation indicated. Anticoagulation done with Xarelto     Continue outpatient amiodarone and metoprolol  Appreciate cardiology consult.      Discussing with Cardiology who recommended loading dose of dig of 0.25 mg q.6 hours x4 doses  Rate control.  Patient is remaining on amiodarone load and is on dig 0.125 mg daily, but worsening renal function we will change to 3 days a week      Stage 3b chronic kidney disease  In reviewing patient's records her creatinine has typically run somewhere between 2 and 2.3 at least as far back as 2018.  I suspect with her decreased cardiac function and volume overload that she is returning to her baseline function      Acute on chronic combined systolic and diastolic congestive heart failure  Patient is identified as having Combined Systolic and Diastolic heart failure that is Acute on chronic. CHF is currently uncontrolled due to Continued edema of extremities. Latest ECHO performed and demonstrates- Results for orders placed during the hospital encounter of 10/08/23    Echo    Interpretation Summary    Rhythm is atrial flutter.    Left Ventricle: The left ventricle is normal in size. Normal wall thickness. There is moderately reduced systolic function with a visually estimated ejection fraction of 35 - 40%.    Left Atrium: Left atrium is moderately dilated.    Right Ventricle: Mild right ventricular enlargement. Systolic function is mildly reduced.    Right Atrium: Right atrium is moderately dilated.    Aortic Valve: The aortic valve is a trileaflet valve. There is mild aortic valve sclerosis.    Mitral Valve: There is mild to moderate regurgitation.     Tricuspid Valve: There is moderate regurgitation.    Pulmonic Valve: There is mild regurgitation.    Pulmonary Artery: The estimated pulmonary artery systolic pressure is 51 mmHg.    IVC/SVC: Elevated venous pressure at 15 mmHg.    Pericardium: There is a trivial effusion.  . Continue Beta Blocker and Furosemide and monitor clinical status closely. Monitor on telemetry. Patient is off CHF pathway.  Monitor strict Is&Os and daily weights.  Place on fluid restriction of 1.5 L. Cardiology has not been any consulted. Continue to stress to patient importance of self efficacy and  on diet for CHF. Last BNP reviewed- and noted below   Recent Labs   Lab 10/27/23  0433   *   .  Symptoms markedly improving.  Patient with minimal shortness of breath.  Continue with titration of diuretics.    Hypothyroidism  Patient currently on replacement dose we will continue      Essential hypertension  Change back to Toprol-XL as blood pressure is now improved        Dyslipidemia  Continue patient's outpatient statin      Bipolar disorder  Patient admitted from [facility]  She has a diagnosis of [facility] and there prior to admission had auditory and visual hallucinations currently she was hyperactive with delusions and unable to understand her situation or take her medications.  She was started on Depakote  mg b.i.d. Zoloft 100 mg daily trazodone 100 mg q.h.s. was just recently started on Risperdal 1 mg p.o. b.i.d. for visual hallucinations.  At the time they were considering nursing home versus group home placement    Appreciate telepsych consult meds were adjusted per their recommendations    Seen by telepsych again today 10/23 with recommendations of medication adjustments reviewed.  No longer needed CEC      VTE Risk Mitigation (From admission, onward)         Ordered     rivaroxaban tablet 15 mg  With dinner         10/20/23 9804     Reason for No  Pharmacological VTE Prophylaxis  Once        Question:  Reasons:  Answer:  Already adequately anticoagulated on oral Anticoagulants    10/19/23 1750     IP VTE HIGH RISK PATIENT  Once         10/19/23 1750     Place sequential compression device  Until discontinued         10/19/23 1750                Discharge Planning   EBEN:      Code Status: Full Code   Is the patient medically ready for discharge?: Yes    Reason for patient still in hospital (select all that apply): Pending disposition  Discharge Plan A: (P) Skilled Nursing Facility                  Debora Trinh MD  Department of Hospital Medicine   [facility] ([facility]

## 2023-10-28 NOTE — PT/OT/SLP PROGRESS
Physical Therapy  Treatment    Alejandra Strong   MRN: [id number]   Admitting Diagnosis: Atrial fibrillation with rapid ventricular response       PT Start Time: 0830     PT Stop Time: 0855    PT Total Time (min): 25 min       Billable Minutes:  Gait Training 15 and Therapeutic Activity 10    Treatment Type: Treatment  PT/PTA: PTA     Number of PTA visits since last PT visit: 1       General Precautions: Standard, fall  Orthopedic Precautions: N/A  Braces: N/A     Subjective:  Communicated with SCOTTIE prior to session.      Pain/Comfort  Pain Rating 1: 0/10  Pain Rating Post-Intervention 1: 0/10    Treatment and Education:    BED MOB SBA    SIT<-->SUPINE SBA VC FOR UPPER EXTREMITY PLACEMENT    SIT<-->STAND SBA VC FOR UPPER EXTREMITY PLACEMENT    RW 2 X 70' GT TRG WITH CG WITH PATIENT REPEATEDLY HITTING OBJECTS ON HER R SIDE IN SPITE OF CUES TO AVOID OBJECTS    PATIENT DEMONSTRATED DECREASED SAFETY WITH FAST TRANSITIONAL MOVEMENTS AND WITH HER BUMPING INTO OBJECTS ON HER R SIDE WHILE WALKING.     EDUCATED ON CALL DON'T FALL PROCEDURE     AM-PAC 6 CLICK MOBILITY  How much help from another person does this patient currently need?   1 = Unable, Total/Dependent Assistance  2 = A lot, Maximum/Moderate Assistance  3 = A little, Minimum/Contact Guard/Supervision  4 = None, Modified [place]/Independent    Turning over in bed (including adjusting bedclothes, sheets and blankets)?: 4  Sitting down on and standing up from a chair with arms (e.g., wheelchair, bedside commode, etc.): 3  Moving from lying on back to sitting on the side of the bed?: 4  Moving to and from a bed to a chair (including a wheelchair)?: 3  Need to walk in hospital room?: 3  Climbing 3-5 steps with a railing?: 1  Basic Mobility Total Score: 18    AM-PAC Raw Score CMS G-Code Modifier Level of Impairment Assistance   6 % Total / Unable   7 - 9 CM 80 - 100% Maximal Assist   10 - 14 CL 60 - 80% Moderate Assist   15 - 19 CK 40 - 60% Moderate  Assist   20 - 22 CJ 20 - 40% Minimal Assist   23 CI 1-20% SBA / CGA   24 CH 0% Independent/ Mod I     Patient left up in chair with all lines intact and chair alarm on.    Assessment:  Alejandra Strong is a 63 y.o. female with a medical diagnosis of Atrial fibrillation with rapid ventricular response and presents with .    Rehab identified problem list/impairments: weakness, gait instability, impaired coordination, decreased lower extremity function, impaired endurance, impaired self care skills, impaired functional mobility, decreased coordination    Rehab potential is fair.    Activity tolerance: Good    Discharge recommendations: Moderate Intensity Therapy      Barriers to discharge:      Equipment recommendations: bedside commode, shower chair, walker, rolling     GOALS:   Multidisciplinary Problems       Physical Therapy Goals          Problem: Physical Therapy    Goal Priority Disciplines Outcome Goal Variances Interventions   Physical Therapy Goal     PT, PT/OT Ongoing, Progressing     Description: All LTGs to be met by 11/4/23    Bed mobility I  Transfers I  Gait of at least 150' mod I   Pt will increase AMPAC score by 2 points to progress functional mobility  Pt will tolerate > 10 min of functional activity to progress gross functional mobility                          PLAN:    Patient to be seen 3 x/week to address the above listed problems via gait training, therapeutic activities, therapeutic exercises  Plan of Care expires: 11/04/23  Plan of Care reviewed with: patient         10/28/2023

## 2023-10-29 LAB
ALBUMIN SERPL BCP-MCNC: 3.1 G/DL (ref 3.5–5.2)
ALP SERPL-CCNC: 60 U/L (ref 55–135)
ALT SERPL W/O P-5'-P-CCNC: 31 U/L (ref 10–44)
ANION GAP SERPL CALC-SCNC: 16 MMOL/L (ref 8–16)
ANISOCYTOSIS BLD QL SMEAR: SLIGHT
AST SERPL-CCNC: 38 U/L (ref 10–40)
BASOPHILS # BLD AUTO: 0.08 K/UL (ref 0–0.2)
BASOPHILS NFR BLD: 1.3 % (ref 0–1.9)
BILIRUB SERPL-MCNC: 0.4 MG/DL (ref 0.1–1)
BUN SERPL-MCNC: 30 MG/DL (ref 8–23)
CALCIUM SERPL-MCNC: 8.1 MG/DL (ref 8.7–10.5)
CHLORIDE SERPL-SCNC: 94 MMOL/L (ref 95–110)
CO2 SERPL-SCNC: 29 MMOL/L (ref 23–29)
CREAT SERPL-MCNC: 1.7 MG/DL (ref 0.5–1.4)
DIFFERENTIAL METHOD: ABNORMAL
EOSINOPHIL # BLD AUTO: 0.1 K/UL (ref 0–0.5)
EOSINOPHIL NFR BLD: 1.7 % (ref 0–8)
ERYTHROCYTE [DISTWIDTH] IN BLOOD BY AUTOMATED COUNT: 22.8 % (ref 11.5–14.5)
EST. GFR  (NO RACE VARIABLE): 33 ML/MIN/1.73 M^2
GLUCOSE SERPL-MCNC: 112 MG/DL (ref 70–110)
HCT VFR BLD AUTO: 39 % (ref 37–48.5)
HGB BLD-MCNC: 11.1 G/DL (ref 12–16)
IMM GRANULOCYTES # BLD AUTO: 0.04 K/UL (ref 0–0.04)
IMM GRANULOCYTES NFR BLD AUTO: 0.6 % (ref 0–0.5)
LYMPHOCYTES # BLD AUTO: 1.9 K/UL (ref 1–4.8)
LYMPHOCYTES NFR BLD: 29.7 % (ref 18–48)
MCH RBC QN AUTO: 24.7 PG (ref 27–31)
MCHC RBC AUTO-ENTMCNC: 28.5 G/DL (ref 32–36)
MCV RBC AUTO: 87 FL (ref 82–98)
MONOCYTES # BLD AUTO: 0.7 K/UL (ref 0.3–1)
MONOCYTES NFR BLD: 11.2 % (ref 4–15)
NEUTROPHILS # BLD AUTO: 3.5 K/UL (ref 1.8–7.7)
NEUTROPHILS NFR BLD: 55.5 % (ref 38–73)
NRBC BLD-RTO: 0 /100 WBC
OVALOCYTES BLD QL SMEAR: ABNORMAL
PLATELET # BLD AUTO: 149 K/UL (ref 150–450)
PMV BLD AUTO: ABNORMAL FL (ref 9.2–12.9)
POTASSIUM SERPL-SCNC: 3.5 MMOL/L (ref 3.5–5.1)
PROT SERPL-MCNC: 8 G/DL (ref 6–8.4)
RBC # BLD AUTO: 4.5 M/UL (ref 4–5.4)
SODIUM SERPL-SCNC: 139 MMOL/L (ref 136–145)
WBC # BLD AUTO: 6.33 K/UL (ref 3.9–12.7)

## 2023-10-29 PROCEDURE — 80053 COMPREHEN METABOLIC PANEL: CPT | Performed by: INTERNAL MEDICINE

## 2023-10-29 PROCEDURE — 21400001 HC TELEMETRY ROOM

## 2023-10-29 PROCEDURE — 36415 COLL VENOUS BLD VENIPUNCTURE: CPT | Performed by: INTERNAL MEDICINE

## 2023-10-29 PROCEDURE — 85025 COMPLETE CBC W/AUTO DIFF WBC: CPT | Performed by: INTERNAL MEDICINE

## 2023-10-29 PROCEDURE — 25000003 PHARM REV CODE 250: Performed by: INTERNAL MEDICINE

## 2023-10-29 PROCEDURE — 25000003 PHARM REV CODE 250: Performed by: STUDENT IN AN ORGANIZED HEALTH CARE EDUCATION/TRAINING PROGRAM

## 2023-10-29 RX ADMIN — LEVOTHYROXINE SODIUM 50 MCG: 50 TABLET ORAL at 05:10

## 2023-10-29 RX ADMIN — DIVALPROEX SODIUM 250 MG: 250 TABLET, DELAYED RELEASE ORAL at 10:10

## 2023-10-29 RX ADMIN — AMIODARONE HYDROCHLORIDE 400 MG: 200 TABLET ORAL at 10:10

## 2023-10-29 RX ADMIN — SERTRALINE HYDROCHLORIDE 50 MG: 50 TABLET ORAL at 10:10

## 2023-10-29 RX ADMIN — RIVAROXABAN 15 MG: 15 TABLET, FILM COATED ORAL at 05:10

## 2023-10-29 RX ADMIN — POLYETHYLENE GLYCOL 3350 17 G: 17 POWDER, FOR SOLUTION ORAL at 10:10

## 2023-10-29 RX ADMIN — DIVALPROEX SODIUM 500 MG: 500 TABLET, DELAYED RELEASE ORAL at 08:10

## 2023-10-29 RX ADMIN — TRAZODONE HYDROCHLORIDE 100 MG: 100 TABLET ORAL at 09:10

## 2023-10-29 RX ADMIN — ATORVASTATIN CALCIUM 20 MG: 10 TABLET, FILM COATED ORAL at 10:10

## 2023-10-29 RX ADMIN — METOPROLOL SUCCINATE 50 MG: 50 TABLET, EXTENDED RELEASE ORAL at 10:10

## 2023-10-29 RX ADMIN — SENNOSIDES AND DOCUSATE SODIUM 1 TABLET: 8.6; 5 TABLET ORAL at 10:10

## 2023-10-29 RX ADMIN — TORSEMIDE 40 MG: 10 TABLET ORAL at 10:10

## 2023-10-29 RX ADMIN — MELATONIN TAB 3 MG 3 MG: 3 TAB at 08:10

## 2023-10-29 NOTE — PLAN OF CARE
Problem: Adult Inpatient Plan of Care  Goal: Optimal Comfort and Wellbeing  Outcome: Ongoing, Progressing     Problem: Adult Inpatient Plan of Care  Goal: Readiness for Transition of Care  Outcome: Ongoing, Progressing     Problem: Skin Injury Risk Increased  Goal: Skin Health and Integrity  Outcome: Ongoing, Progressing     Chart check complete.

## 2023-10-29 NOTE — PROGRESS NOTES
[facility] Medicine  Progress Note    Patient Name: Alejandra Strong  MRN: [id number]  Patient Class: IP- Inpatient   Admission Date: 10/19/2023  Length of Stay: 9 days  Attending Physician: Debora White MD  Primary Care Provider: Mer, Primary Doctor        Subjective:     Principal Problem:Atrial fibrillation with rapid ventricular response        HPI:  Patient is a 63-year-old female who has  has a past medical history of Anticoagulant long-term use, Bipolar disorder unspecified, CHF (congestive heart failure) reduced ejection fraction, CKD 3 (chronic kidney disease), Diabetes mellitus type 2, Hypertension, Hypothyroidism, unspecified, and Paroxysmal atrial fibrillation.     Patient initially presented to [facility] with complaints of visual and auditory hallucinations.  She was noted to be in AFib/flutter with RVR.  She was admitted to the hospital under pec her rate was controlled with amiodarone, Toprol-XL 50 mg a day and she was discharged to [facility] for further treatment of her bipolar disorder.  She she was discharged 10/11/2023 to [facility].  She is been seen by PCP and Psychiatry there until today when she was noted to have significant swelling in her legs and feet and sent under a pec/CEC for evaluation.  In the emergency department she was noted to be in AFib/flutter rate of 114.  She was also noted to have potassium of 2.7, calcium of 5.4 corrected to 7.2 and a BNP at 9:12 a.m. which is down from 1993 on October 10th.  She was given IV Lasix calcium and potassium in the emergency department.  Her initial troponin was negative.  Patient is seen lethargic but arousable.  Knows she is in the hospital, the date, as well as her birth date.  She denies any chest pain or shortness a breath.    She is admitted under a pec/CEC will need a sitter and will consult psych for help with her meds in a.m.      Overview/Hospital Course:  Patient  admitted cardiology and Psychiatry were consulted.  Patient was begun on amiodarone drip for control for AFib with RVR.  Meds were adjusted per Psychiatry.    Patient being seen by Cardiology.  She was started on amnio drip to attempted controlled rate.  Overnight her heart rate remained high and her blood pressure this morning was low cardiology will be evaluating her soon.    Seen by cardiology and low blood pressure was started on digoxin load for control of heart rate.  Reviewing her records seems that patient has had CKD for several years and likely we will need continued diuresis.    With loading dose of amiodarone, and digoxin patient's heart rate controlled.  Seen again by psychiatry who felt that adjust medications as appropriate but that she was no longer at risk in PEC/CPC was canceled.  Patient is weak and debilitated and therefore seen by PT and OT with recommendation for skilled nursing placement prior to returning home.  Patient would like to go to skilled nursing facility closer to her home near [place] therefore order was placed for social work to attempt to place her in a facility there.    Actively participate with physical therapy and occupational therapy with improved strength.  Patient reports she is working well with therapy and would like to return closer to home.      Interval History:  Patient seen sitting in bed using adult coloring book.  No complaints other than ready to go home.    Review of Systems   Constitutional:  Negative for fatigue and fever.   Respiratory:  Negative for cough and shortness of breath.    Cardiovascular:  Negative for chest pain and leg swelling.   Gastrointestinal:  Negative for nausea and vomiting.   All other systems reviewed and are negative.    Objective:     Vital Signs (Most Recent):  Temp: 98.2 °F (36.8 °C) (10/29/23 1323)  Pulse: 73 (10/29/23 1323)  Resp: 17 (10/29/23 1323)  BP: (!) 111/57 (10/29/23 1323)  SpO2: 98 % (10/29/23 1323) Vital Signs (24h  Range):  Temp:  [97.9 °F (36.6 °C)-98.4 °F (36.9 °C)] 98.2 °F (36.8 °C)  Pulse:  [61-77] 73  Resp:  [15-19] 17  SpO2:  [95 %-100 %] 98 %  BP: (103-146)/(57-78) 111/57     Weight: 55 kg (121 lb 4.1 oz)  Body mass index is 21.48 kg/m².  No intake or output data in the 24 hours ending 10/29/23 1457      Physical Exam  Vitals reviewed.   Constitutional:       Appearance: Normal appearance. She is ill-appearing (Chronically).   HENT:      Head: Normocephalic and atraumatic.      Mouth/Throat:      Mouth: Mucous membranes are moist.      Pharynx: Oropharynx is clear.   Eyes:      Extraocular Movements: Extraocular movements intact.      Conjunctiva/sclera: Conjunctivae normal.   Cardiovascular:      Rate and Rhythm: Normal rate and regular rhythm.      Pulses: Normal pulses.      Heart sounds: Normal heart sounds.   Pulmonary:      Effort: Pulmonary effort is normal.      Breath sounds: Normal breath sounds.   Abdominal:      General: Bowel sounds are normal.      Palpations: Abdomen is soft.   Musculoskeletal:         General: Normal range of motion.      Cervical back: Normal range of motion and neck supple.   Skin:     General: Skin is warm and dry.   Neurological:      General: No focal deficit present.      Mental Status: She is alert and oriented to person, place, and time. Mental status is at baseline.   Psychiatric:         Mood and Affect: Mood normal.         Behavior: Behavior normal.         Thought Content: Thought content normal.             Significant Labs: All pertinent labs within the past 24 hours have been reviewed.  CBC:   Recent Labs   Lab 10/29/23  0433   WBC 6.33   HGB 11.1*   HCT 39.0   *     CMP:   Recent Labs   Lab 10/29/23  0433      K 3.5   CL 94*   CO2 29   *   BUN 30*   CREATININE 1.7*   CALCIUM 8.1*   PROT 8.0   ALBUMIN 3.1*   BILITOT 0.4   ALKPHOS 60   AST 38   ALT 31   ANIONGAP 16       Significant Imaging: I have reviewed all pertinent imaging results/findings within  the past 24 hours.      Assessment/Plan:      * Atrial fibrillation with rapid ventricular response  Patient with Long standing persistent (>12 months) atrial fibrillation which is uncontrolled currently with Beta Blocker and Amiodarone. Patient is currently in atrial fibrillation.[id number] Score: 1. HASBLED Score: . Anticoagulation indicated. Anticoagulation done with Xarelto     Continue outpatient amiodarone and metoprolol  Appreciate cardiology consult.      Discussing with Cardiology who recommended loading dose of dig of 0.25 mg q.6 hours x4 doses  Rate control.  Patient is remaining on amiodarone load and is on dig 0.125 mg daily, but worsening renal function we will change to 3 days a week    Finishing the loading dose of amiodarone and beta-blocker patient's heart rate is well-controlled.  Discussed cardiology who recommends dc  digoxin.      Stage 3b chronic kidney disease  In reviewing patient's records her creatinine has typically run somewhere between 2 and 2.3 at least as far back as 2018.  I suspect with her decreased cardiac function and volume overload that she is returning to her baseline function      Acute on chronic combined systolic and diastolic congestive heart failure  Patient is identified as having Combined Systolic and Diastolic heart failure that is Acute on chronic. CHF is currently uncontrolled due to Continued edema of extremities. Latest ECHO performed and demonstrates- Results for orders placed during the hospital encounter of 10/08/23    Echo    Interpretation Summary    Rhythm is atrial flutter.    Left Ventricle: The left ventricle is normal in size. Normal wall thickness. There is moderately reduced systolic function with a visually estimated ejection fraction of 35 - 40%.    Left Atrium: Left atrium is moderately dilated.    Right Ventricle: Mild right ventricular enlargement. Systolic function is mildly reduced.    Right Atrium: Right atrium is moderately dilated.     Aortic Valve: The aortic valve is a trileaflet valve. There is mild aortic valve sclerosis.    Mitral Valve: There is mild to moderate regurgitation.    Tricuspid Valve: There is moderate regurgitation.    Pulmonic Valve: There is mild regurgitation.    Pulmonary Artery: The estimated pulmonary artery systolic pressure is 51 mmHg.    IVC/SVC: Elevated venous pressure at 15 mmHg.    Pericardium: There is a trivial effusion.  . Continue Beta Blocker and Furosemide and monitor clinical status closely. Monitor on telemetry. Patient is off CHF pathway.  Monitor strict Is&Os and daily weights.  Place on fluid restriction of 1.5 L. Cardiology has not been any consulted. Continue to stress to patient importance of self efficacy and  on diet for CHF. Last BNP reviewed- and noted below   Recent Labs   Lab 10/27/23  0433   *   .  Symptoms markedly improving.  Patient with minimal shortness of breath.  Continue with titration of diuretics.    Hypothyroidism  Patient currently on replacement dose we will continue      Essential hypertension  Change back to Toprol-XL as blood pressure is now improved        Dyslipidemia  Continue patient's outpatient statin      Bipolar disorder  Patient admitted from [facility]  [facility] has a [facility] and there prior to admission had auditory and visual hallucinations currently she was hyperactive with delusions and unable to understand her situation or take her medications.  She was started on Depakote  mg b.i.d. Zoloft 100 mg daily trazodone 100 mg q.h.s. was just recently started on Risperdal 1 mg p.o. b.i.d. for visual hallucinations.  At the time they were considering nursing home versus group home placement    Appreciate telepsych consult meds were adjusted per their recommendations    Seen by telepsych again today 10/23 with recommendations of medication adjustments reviewed.  No longer  needed CEC        VTE Risk Mitigation (From admission, onward)         Ordered     rivaroxaban tablet 15 mg  With dinner         10/20/23 0938     Reason for No Pharmacological VTE Prophylaxis  Once        Question:  Reasons:  Answer:  Already adequately anticoagulated on oral Anticoagulants    10/19/23 1750     IP VTE HIGH RISK PATIENT  Once         10/19/23 1750     Place sequential compression device  Until discontinued         10/19/23 1750                Discharge Planning   EBEN:      Code Status: Full Code   Is the patient medically ready for discharge?: Yes    Reason for patient still in hospital (select all that apply): Pending disposition  Discharge Plan A: (P) Skilled Nursing Facility vs. home                 Debora Trinh MD  Department of Hospital Medicine   [facility] - Telemetry ([facility])

## 2023-10-29 NOTE — ASSESSMENT & PLAN NOTE
Patient with Long standing persistent (>12 months) atrial fibrillation which is uncontrolled currently with Beta Blocker and Amiodarone. Patient is currently in atrial fibrillation.[id number] Score: 1. HASBLED Score: . Anticoagulation indicated. Anticoagulation done with Xarelto     Continue outpatient amiodarone and metoprolol  Appreciate cardiology consult.      Discussing with Cardiology who recommended loading dose of dig of 0.25 mg q.6 hours x4 doses  Rate control.  Patient is remaining on amiodarone load and is on dig 0.125 mg daily, but worsening renal function we will change to 3 days a week    Finishing the loading dose of amiodarone and beta-blocker patient's heart rate is well-controlled.  Discussed cardiology who recommends dc  digoxin.

## 2023-10-29 NOTE — SUBJECTIVE & OBJECTIVE
Interval History:  Patient seen sitting in bed using adult coloring book.  No complaints other than ready to go home.    Review of Systems   Constitutional:  Negative for fatigue and fever.   Respiratory:  Negative for cough and shortness of breath.    Cardiovascular:  Negative for chest pain and leg swelling.   Gastrointestinal:  Negative for nausea and vomiting.   All other systems reviewed and are negative.    Objective:     Vital Signs (Most Recent):  Temp: 98.2 °F (36.8 °C) (10/29/23 1323)  Pulse: 73 (10/29/23 1323)  Resp: 17 (10/29/23 1323)  BP: (!) 111/57 (10/29/23 1323)  SpO2: 98 % (10/29/23 1323) Vital Signs (24h Range):  Temp:  [97.9 °F (36.6 °C)-98.4 °F (36.9 °C)] 98.2 °F (36.8 °C)  Pulse:  [61-77] 73  Resp:  [15-19] 17  SpO2:  [95 %-100 %] 98 %  BP: (103-146)/(57-78) 111/57     Weight: 55 kg (121 lb 4.1 oz)  Body mass index is 21.48 kg/m².  No intake or output data in the 24 hours ending 10/29/23 1457      Physical Exam  Vitals reviewed.   Constitutional:       Appearance: Normal appearance. She is ill-appearing (Chronically).   HENT:      Head: Normocephalic and atraumatic.      Mouth/Throat:      Mouth: Mucous membranes are moist.      Pharynx: Oropharynx is clear.   Eyes:      Extraocular Movements: Extraocular movements intact.      Conjunctiva/sclera: Conjunctivae normal.   Cardiovascular:      Rate and Rhythm: Normal rate and regular rhythm.      Pulses: Normal pulses.      Heart sounds: Normal heart sounds.   Pulmonary:      Effort: Pulmonary effort is normal.      Breath sounds: Normal breath sounds.   Abdominal:      General: Bowel sounds are normal.      Palpations: Abdomen is soft.   Musculoskeletal:         General: Normal range of motion.      Cervical back: Normal range of motion and neck supple.   Skin:     General: Skin is warm and dry.   Neurological:      General: No focal deficit present.      Mental Status: She is alert and oriented to person, place, and time. Mental status is at  baseline.   Psychiatric:         Mood and Affect: Mood normal.         Behavior: Behavior normal.         Thought Content: Thought content normal.             Significant Labs: All pertinent labs within the past 24 hours have been reviewed.  CBC:   Recent Labs   Lab 10/29/23  0433   WBC 6.33   HGB 11.1*   HCT 39.0   *     CMP:   Recent Labs   Lab 10/29/23  0433      K 3.5   CL 94*   CO2 29   *   BUN 30*   CREATININE 1.7*   CALCIUM 8.1*   PROT 8.0   ALBUMIN 3.1*   BILITOT 0.4   ALKPHOS 60   AST 38   ALT 31   ANIONGAP 16       Significant Imaging: I have reviewed all pertinent imaging results/findings within the past 24 hours.

## 2023-10-29 NOTE — SUBJECTIVE & OBJECTIVE
Interval History:  Patient seen and examined at bedside.  She is participating with therapy and requesting coloring sheets.  She denies any complaints.    Review of Systems   Constitutional:  Negative for fatigue and fever.   Respiratory:  Negative for cough and shortness of breath.    Cardiovascular:  Negative for chest pain and leg swelling.   Gastrointestinal:  Negative for nausea and vomiting.   All other systems reviewed and are negative.    Objective:     Vital Signs: reviewed        Weight: 55 kg (121 lb 4.1 oz)  Body mass index is 21.48 kg/m².  No intake or output data in the 24 hours ending 10/29/23 1014      Physical Exam  Vitals reviewed.   Constitutional:       Appearance: Normal appearance. She is ill-appearing.   HENT:      Head: Normocephalic and atraumatic.      Mouth/Throat:      Mouth: Mucous membranes are moist.      Pharynx: Oropharynx is clear.   Eyes:      Extraocular Movements: Extraocular movements intact.      Conjunctiva/sclera: Conjunctivae normal.   Cardiovascular:      Rate and Rhythm: Normal rate. Rhythm irregular.      Pulses: Normal pulses.      Heart sounds: Normal heart sounds.   Pulmonary:      Effort: Pulmonary effort is normal.      Breath sounds: Normal breath sounds.   Abdominal:      General: Bowel sounds are normal.      Palpations: Abdomen is soft.   Musculoskeletal:         General: Normal range of motion.      Cervical back: Normal range of motion and neck supple.   Skin:     General: Skin is warm and dry.   Neurological:      General: No focal deficit present.      Mental Status: She is alert and oriented to person, place, and time. Mental status is at baseline.             Significant Labs: All pertinent labs within the past 24 hours have been reviewed.    Significant Imaging: I have reviewed all pertinent imaging results/findings within the past 24 hours.

## 2023-10-29 NOTE — PROGRESS NOTES
[facility] Medicine  Progress Note    Patient Name: Alejandra Strong  MRN: [id number]  Patient Class: IP- Inpatient   Admission Date: 10/19/2023  Length of Stay: 9 days  Attending Physician: Debora White MD  Primary Care Provider: Mer, Primary Doctor        Subjective:     Principal Problem:Atrial fibrillation with rapid ventricular response        HPI:  Patient is a 63-year-old female who has  has a past medical history of Anticoagulant long-term use, Bipolar disorder unspecified, CHF (congestive heart failure) reduced ejection fraction, CKD 3 (chronic kidney disease), Diabetes mellitus type 2, Hypertension, Hypothyroidism, unspecified, and Paroxysmal atrial fibrillation.     Patient initially presented to [facility] with complaints of visual and auditory hallucinations.  She was noted to be in AFib/flutter with RVR.  She was admitted to the hospital under pec her rate was controlled with amiodarone, Toprol-XL 50 mg a day and she was discharged to [facility] for further treatment of her bipolar disorder.  She she was discharged 10/11/2023 to [facility].  She is been seen by PCP and Psychiatry there until today when she was noted to have significant swelling in her legs and feet and sent under a pec/CEC for evaluation.  In the emergency department she was noted to be in AFib/flutter rate of 114.  She was also noted to have potassium of 2.7, calcium of 5.4 corrected to 7.2 and a BNP at 9:12 a.m. which is down from 1993 on October 10th.  She was given IV Lasix calcium and potassium in the emergency department.  Her initial troponin was negative.  Patient is seen lethargic but arousable.  Knows she is in the hospital, the date, as well as her birth date.  She denies any chest pain or shortness a breath.    She is admitted under a pec/CEC will need a sitter and will consult psych for help with her meds in a.m.      Overview/Hospital Course:  Patient  admitted cardiology and Psychiatry were consulted.  Patient was begun on amiodarone drip for control for AFib with RVR.  Meds were adjusted per Psychiatry.    Patient being seen by Cardiology.  She was started on amnio drip to attempted controlled rate.  Overnight her heart rate remained high and her blood pressure this morning was low cardiology will be evaluating her soon.    Seen by cardiology and low blood pressure was started on digoxin load for control of heart rate.  Reviewing her records seems that patient has had CKD for several years and likely we will need continued diuresis.    With loading dose of amiodarone, and digoxin patient's heart rate controlled.  Seen again by psychiatry who felt that adjust medications as appropriate but that she was no longer at risk in PEC/CPC was canceled.  Patient is weak and debilitated and therefore seen by PT and OT with recommendation for skilled nursing placement prior to returning home.  Patient would like to go to skilled nursing facility closer to her home near [place] therefore order was placed for social work to attempt to place her in a facility there.    Actively participate with physical therapy and occupational therapy with improved strength.  Patient reports she is working well with therapy and would like to return closer to home.      Interval History:  Patient seen and examined at bedside.  She is participating with therapy and requesting coloring sheets.  She denies any complaints.    Review of Systems   Constitutional:  Negative for fatigue and fever.   Respiratory:  Negative for cough and shortness of breath.    Cardiovascular:  Negative for chest pain and leg swelling.   Gastrointestinal:  Negative for nausea and vomiting.   All other systems reviewed and are negative.    Objective:     Vital Signs: reviewed        Weight: 55 kg (121 lb 4.1 oz)  Body mass index is 21.48 kg/m².  No intake or output data in the 24 hours ending 10/29/23 1014       Physical Exam  Vitals reviewed.   Constitutional:       Appearance: Normal appearance. She is ill-appearing.   HENT:      Head: Normocephalic and atraumatic.      Mouth/Throat:      Mouth: Mucous membranes are moist.      Pharynx: Oropharynx is clear.   Eyes:      Extraocular Movements: Extraocular movements intact.      Conjunctiva/sclera: Conjunctivae normal.   Cardiovascular:      Rate and Rhythm: Normal rate. Rhythm irregular.      Pulses: Normal pulses.      Heart sounds: Normal heart sounds.   Pulmonary:      Effort: Pulmonary effort is normal.      Breath sounds: Normal breath sounds.   Abdominal:      General: Bowel sounds are normal.      Palpations: Abdomen is soft.   Musculoskeletal:         General: Normal range of motion.      Cervical back: Normal range of motion and neck supple.   Skin:     General: Skin is warm and dry.   Neurological:      General: No focal deficit present.      Mental Status: She is alert and oriented to person, place, and time. Mental status is at baseline.             Significant Labs: All pertinent labs within the past 24 hours have been reviewed.    Significant Imaging: I have reviewed all pertinent imaging results/findings within the past 24 hours.      Assessment/Plan:      * Atrial fibrillation with rapid ventricular response  Patient with Long standing persistent (>12 months) atrial fibrillation which is uncontrolled currently with Beta Blocker and Amiodarone. Patient is currently in atrial fibrillation.[id number] Score: 1. HASBLED Score: . Anticoagulation indicated. Anticoagulation done with Xarelto     Continue outpatient amiodarone and metoprolol  Appreciate cardiology consult.      Discussing with Cardiology who recommended loading dose of dig of 0.25 mg q.6 hours x4 doses  Rate control.  Patient is remaining on amiodarone load and is on dig 0.125 mg daily, but worsening renal function we will change to 3 days a week      Stage 3b chronic kidney disease  In reviewing  patient's records her creatinine has typically run somewhere between 2 and 2.3 at least as far back as 2018.  I suspect with her decreased cardiac function and volume overload that she is returning to her baseline function      Acute on chronic combined systolic and diastolic congestive heart failure  Patient is identified as having Combined Systolic and Diastolic heart failure that is Acute on chronic. CHF is currently uncontrolled due to Continued edema of extremities. Latest ECHO performed and demonstrates- Results for orders placed during the hospital encounter of 10/08/23    Echo    Interpretation Summary    Rhythm is atrial flutter.    Left Ventricle: The left ventricle is normal in size. Normal wall thickness. There is moderately reduced systolic function with a visually estimated ejection fraction of 35 - 40%.    Left Atrium: Left atrium is moderately dilated.    Right Ventricle: Mild right ventricular enlargement. Systolic function is mildly reduced.    Right Atrium: Right atrium is moderately dilated.    Aortic Valve: The aortic valve is a trileaflet valve. There is mild aortic valve sclerosis.    Mitral Valve: There is mild to moderate regurgitation.    Tricuspid Valve: There is moderate regurgitation.    Pulmonic Valve: There is mild regurgitation.    Pulmonary Artery: The estimated pulmonary artery systolic pressure is 51 mmHg.    IVC/SVC: Elevated venous pressure at 15 mmHg.    Pericardium: There is a trivial effusion.  . Continue Beta Blocker and Furosemide and monitor clinical status closely. Monitor on telemetry. Patient is off CHF pathway.  Monitor strict Is&Os and daily weights.  Place on fluid restriction of 1.5 L. Cardiology has not been any consulted. Continue to stress to patient importance of self efficacy and  on diet for CHF. Last BNP reviewed- and noted below   Recent Labs   Lab 10/27/23  0433   *   .  Symptoms markedly improving.  Patient with minimal shortness  of breath.  Continue with titration of diuretics.    Hypothyroidism  Patient currently on replacement dose we will continue      Essential hypertension  Change back to Toprol-XL as blood pressure is now improved        Dyslipidemia  Continue patient's outpatient statin      Bipolar disorder  Patient admitted from [facility]  She has a diagnosis of bipolar just is under [facility] is reportedly homeless and there prior to admission had auditory and visual hallucinations currently she was hyperactive with delusions and unable to understand her situation or take her medications.  She was started on Depakote  mg b.i.d. Zoloft 100 mg daily trazodone 100 mg q.h.s. was just recently started on Risperdal 1 mg p.o. b.i.d. for visual hallucinations.  At the time they were considering nursing home versus group home placement    Appreciate telepsych consult meds were adjusted per their recommendations    Seen by telepsych again today 10/23 with recommendations of medication adjustments reviewed.  No longer needed CEC        VTE Risk Mitigation (From admission, onward)         Ordered     rivaroxaban tablet 15 mg  With dinner         10/20/23 0938     Reason for No Pharmacological VTE Prophylaxis  Once        Question:  Reasons:  Answer:  Already adequately anticoagulated on oral Anticoagulants    10/19/23 1750     IP VTE HIGH RISK PATIENT  Once         10/19/23 1750     Place sequential compression device  Until discontinued         10/19/23 1750                Discharge Planning   EBEN:      Code Status: Full Code   Is the patient medically ready for discharge?: Yes    Reason for patient still in hospital (select all that apply): Pending disposition  Discharge Plan A: (P) Skilled Nursing Facility                  Debora Trinh MD  Department of Hospital Medicine   [facility] - Telemetry ([facility])

## 2023-10-29 NOTE — ASSESSMENT & PLAN NOTE
Patient admitted from [facility]  She has a diagnosis of bipolar just is under [facility] is reportedly homeless and there prior to admission had auditory and visual hallucinations currently she was hyperactive with delusions and unable to understand her situation or take her medications.  She was started on Depakote  mg b.i.d. Zoloft 100 mg daily trazodone 100 mg q.h.s. was just recently started on Risperdal 1 mg p.o. b.i.d. for visual hallucinations.  At the time they were considering nursing home versus group home placement    Appreciate telepsych consult meds were adjusted per their recommendations    Seen by telepsych again today 10/23 with recommendations of medication adjustments reviewed.  No longer needed CEC

## 2023-10-30 PROBLEM — E44.0 MODERATE PROTEIN-CALORIE MALNUTRITION: Status: ACTIVE | Noted: 2023-10-30

## 2023-10-30 PROBLEM — E43 SEVERE PROTEIN-CALORIE MALNUTRITION: Status: ACTIVE | Noted: 2023-10-30

## 2023-10-30 PROCEDURE — 97116 GAIT TRAINING THERAPY: CPT

## 2023-10-30 PROCEDURE — 25000003 PHARM REV CODE 250: Performed by: STUDENT IN AN ORGANIZED HEALTH CARE EDUCATION/TRAINING PROGRAM

## 2023-10-30 PROCEDURE — 25000003 PHARM REV CODE 250: Performed by: INTERNAL MEDICINE

## 2023-10-30 PROCEDURE — 97530 THERAPEUTIC ACTIVITIES: CPT

## 2023-10-30 PROCEDURE — 97535 SELF CARE MNGMENT TRAINING: CPT

## 2023-10-30 PROCEDURE — 21400001 HC TELEMETRY ROOM

## 2023-10-30 RX ORDER — DIVALPROEX SODIUM 500 MG/1
500 TABLET, DELAYED RELEASE ORAL NIGHTLY
Start: 2023-10-30 | End: 2024-10-29

## 2023-10-30 RX ORDER — OLANZAPINE 2.5 MG/1
2.5 TABLET ORAL EVERY 8 HOURS PRN
Start: 2023-10-30

## 2023-10-30 RX ORDER — SERTRALINE HYDROCHLORIDE 50 MG/1
50 TABLET, FILM COATED ORAL DAILY
Start: 2023-10-31 | End: 2024-10-30

## 2023-10-30 RX ORDER — AMIODARONE HYDROCHLORIDE 200 MG/1
TABLET ORAL
Start: 2023-10-30

## 2023-10-30 RX ORDER — LEVOTHYROXINE SODIUM 50 UG/1
50 TABLET ORAL
Start: 2023-10-31 | End: 2024-10-30

## 2023-10-30 RX ORDER — DIVALPROEX SODIUM 250 MG/1
250 TABLET, DELAYED RELEASE ORAL DAILY
Start: 2023-10-31 | End: 2024-10-30

## 2023-10-30 RX ORDER — TALC
3 POWDER (GRAM) TOPICAL NIGHTLY
Refills: 0
Start: 2023-10-30

## 2023-10-30 RX ORDER — POLYETHYLENE GLYCOL 3350 17 G/17G
17 POWDER, FOR SOLUTION ORAL DAILY
Refills: 0
Start: 2023-10-31

## 2023-10-30 RX ADMIN — POLYETHYLENE GLYCOL 3350 17 G: 17 POWDER, FOR SOLUTION ORAL at 09:10

## 2023-10-30 RX ADMIN — LEVOTHYROXINE SODIUM 50 MCG: 50 TABLET ORAL at 06:10

## 2023-10-30 RX ADMIN — DIVALPROEX SODIUM 250 MG: 250 TABLET, DELAYED RELEASE ORAL at 09:10

## 2023-10-30 RX ADMIN — TORSEMIDE 40 MG: 10 TABLET ORAL at 09:10

## 2023-10-30 RX ADMIN — SERTRALINE HYDROCHLORIDE 50 MG: 50 TABLET ORAL at 09:10

## 2023-10-30 RX ADMIN — ATORVASTATIN CALCIUM 20 MG: 10 TABLET, FILM COATED ORAL at 09:10

## 2023-10-30 RX ADMIN — RIVAROXABAN 15 MG: 15 TABLET, FILM COATED ORAL at 06:10

## 2023-10-30 RX ADMIN — METOPROLOL SUCCINATE 50 MG: 50 TABLET, EXTENDED RELEASE ORAL at 09:10

## 2023-10-30 RX ADMIN — SENNOSIDES AND DOCUSATE SODIUM 1 TABLET: 8.6; 5 TABLET ORAL at 09:10

## 2023-10-30 RX ADMIN — AMIODARONE HYDROCHLORIDE 400 MG: 200 TABLET ORAL at 09:10

## 2023-10-30 RX ADMIN — DIVALPROEX SODIUM 500 MG: 500 TABLET, DELAYED RELEASE ORAL at 08:10

## 2023-10-30 RX ADMIN — MELATONIN TAB 3 MG 3 MG: 3 TAB at 08:10

## 2023-10-30 RX ADMIN — TRAZODONE HYDROCHLORIDE 100 MG: 100 TABLET ORAL at 08:10

## 2023-10-30 NOTE — PLAN OF CARE
Spoke to Abiola at [facility].  Corporate is still reviewing financial information.  Requesting receipts for the cash that was taken out of her account amounting to $900 per month.    Spoke with Vilma at [facility].  Referral sent in careport with patient's financial information for review.      Ghada with [facility] visited patient and clinically accepted.  Awaiting final review with administration.  Plan discharge in the AM

## 2023-10-30 NOTE — PT/OT/SLP PROGRESS
Physical Therapy Treatment    Patient Name:  Alejandra Strong   MRN:  [id number]    Recommendations:     Discharge Recommendations: Moderate Intensity Therapy  Discharge Equipment Recommendations: bedside commode, shower chair, walker, rolling  Barriers to discharge: None    Assessment:     Alejandra Strong is a 63 y.o. female admitted with a medical diagnosis of Atrial fibrillation with rapid ventricular response.  She presents with the following impairments/functional limitations: weakness, impaired endurance, impaired self care skills, impaired coordination, gait instability, impaired balance, impaired functional mobility, decreased coordination, decreased upper extremity function, decreased lower extremity function, decreased safety awareness, impaired skin.    Rehab Prognosis: Good; patient would benefit from acute skilled PT services to address these deficits and reach maximum level of function.    Recent Surgery: * No surgery found *      Plan:     During this hospitalization, patient to be seen 3 x/week to address the identified rehab impairments via gait training, therapeutic activities, therapeutic exercises and progress toward the following goals:    Plan of Care Expires:  11/04/23    Subjective     Chief Complaint: NONE   Patient/Family Comments/goals: PT READY TO GO HOME  Pain/Comfort:  Pain Rating 1: 0/10      Objective:     Communicated with NURSE RUBIN prior to session.  Patient found supine with peripheral IV, telemetry, bed alarm, PureWick upon PT entry to room.     General Precautions: Standard, fall  Orthopedic Precautions: N/A  Braces: N/A  Respiratory Status: Room air    Integumentary: PT WAS BLEEDING ON THE RIGHT SIDE OF HER NOSE DUE TO SMALL SCRAPE NOTICED UPON ENTRY TO HER ROOM. PT WAS GIVEN TOWEL TO WIPE BLOOD AND BAND AID TO COVER.      Functional Mobility:  Bed Mobility:     Rolling Left:  stand by assistance  Scooting: stand by assistance  Supine to Sit: stand by  "assistance  Transfers:     Sit to Stand:  stand by assistance with rolling walker; VCS NEEDED FOR HAND PLACEMENT;  Stand to Sit: stand by assistance with rolling walker; VCS NEEDED FOR HAND PLACEMENT; DECREASED CONTROL OF LOWERING BODY INTO CHAIR  Gait: PT  FT X 1 TRIAL  FT X 1 TRIAL WITH RW AND PATRICK FOR SAFETY CUEING AND CONTROL OF WALKER. PT CONTINUES TO DEMONSTRATE RIGHT LATERAL LISTING AND UNAWARENESS OF OBJECTS ON THE RIGHT SIDE. MAX CUEING NEEDED THROUGHOUT FOR WALKER NAVIGATION AND OBSTACLE AWARENESS ON THE RIGHT RIDE. PT DEMONSTRATES POSTERIOR TRUNK LEAN THAT THE PATIENT WAS UNABLE TO CORRECT WITH VC OR TC. PT REMAINED FAIRLY STEADY THROUGHOUT WITH NO LOB.   Balance: GOOD STATIC SITTING BALANCE EOB; FAIR DYNAMIC STANDING BALANCE DURING AMB; PT PERFORMED 8 MIN OF STATIC SITTING BALANCE EOB WITH SBA IN ORDER TO PERFORM HAND AND FACE HYGIENE, COMB HAIR, AND APPLY BAND-AID TO NOSE     AM-PAC 6 CLICK MOBILITY  Turning over in bed (including adjusting bedclothes, sheets and blankets)?: 4  Sitting down on and standing up from a chair with arms (e.g., wheelchair, bedside commode, etc.): 4  Moving from lying on back to sitting on the side of the bed?: 4  Moving to and from a bed to a chair (including a wheelchair)?: 4  Need to walk in hospital room?: 3  Climbing 3-5 steps with a railing?: 1 (NT)  Basic Mobility Total Score: 20     Treatment & Education:  PT EDUCATED ON P.T. POC AND ROLE OF PT IN THE ACUTE CARE SETTING.   PT EDUCATED ON BENEFITS OF UPRIGHT POSITION AND ENCOURAGED TO REMAIN IN SEATED POSITION FOR AS LONG AS TOLERATED.   PT EDUCATED ON "CALL DON'T FALL" POLICY AND INSTRUCTED TO USE CALL BUTTON WHEN WANTING TO CHANGE POSITIONS.   PT EDUCATED ON IMPORTANCE OF PHYSICAL ACTIVITY AND GIVEN THERAPEUTIC EXERCISE TO PERFORM THROUGHOUT THE DAY (SEATED MARCHES, LAQS, ANKLE PUMPS, 10 REPS BILATERALLY EACH)   PT EDUCATED ON RW SAFETY AND INSTRUCTIONS DURING TF'S AND GAIT.    Patient left up in chair " with all lines intact, call button in reach, and chair alarm on..    GOALS:   Multidisciplinary Problems       Physical Therapy Goals          Problem: Physical Therapy    Goal Priority Disciplines Outcome Goal Variances Interventions   Physical Therapy Goal     PT, PT/OT Ongoing, Progressing     Description: All LTGs to be met by 11/4/23    Bed mobility I  Transfers I  Gait of at least 150' mod I   Pt will increase AMPAC score by 2 points to progress functional mobility  Pt will tolerate > 10 min of functional activity to progress gross functional mobility                          Time Tracking:     PT Received On: 10/30/23  PT Start Time: 1000     PT Stop Time: 1025  PT Total Time (min): 25 min     Billable Minutes: Gait Training 10 and Therapeutic Activity 15    Treatment Type: Treatment  PT/PTA: PT     Number of PTA visits since last PT visit: 0     Marta Wagner SPT  10/30/2023

## 2023-10-30 NOTE — SUBJECTIVE & OBJECTIVE
Interval History: No acute events overnight.  Seen and examined without any family present.  Denies any complaints, only stated that she would like to go home. SNF placement is pending      Review of Systems  Objective:     Vital Signs (Most Recent):  Temp: 98 °F (36.7 °C) (10/30/23 1206)  Pulse: 61 (10/30/23 1206)  Resp: 20 (10/30/23 1206)  BP: 120/63 (10/30/23 1206)  SpO2: 98 % (10/30/23 1206) Vital Signs (24h Range):  Temp:  [97.5 °F (36.4 °C)-98.3 °F (36.8 °C)] 98 °F (36.7 °C)  Pulse:  [57-73] 61  Resp:  [16-20] 20  SpO2:  [95 %-99 %] 98 %  BP: (111-141)/(56-73) 120/63     Weight: 55 kg (121 lb 4.1 oz)  Body mass index is 21.48 kg/m².    Intake/Output Summary (Last 24 hours) at 10/30/2023 1228  Last data filed at 10/30/2023 1105  Gross per 24 hour   Intake 240 ml   Output 600 ml   Net -360 ml         Physical Exam  Vitals and nursing note reviewed.   Constitutional:       Appearance: Normal appearance. She is ill-appearing (Chronically).   HENT:      Head: Normocephalic.      Mouth/Throat:      Mouth: Mucous membranes are moist.   Eyes:      Conjunctiva/sclera: Conjunctivae normal.   Cardiovascular:      Rate and Rhythm: Normal rate and regular rhythm.      Pulses: Normal pulses.      Heart sounds: Normal heart sounds.   Pulmonary:      Effort: Pulmonary effort is normal. No respiratory distress.      Breath sounds: Normal breath sounds. No wheezing or rhonchi.      Comments: On room air  Abdominal:      General: Bowel sounds are normal.      Palpations: Abdomen is soft.      Tenderness: There is no abdominal tenderness.   Musculoskeletal:         General: Normal range of motion.      Cervical back: Normal range of motion and neck supple.   Skin:     General: Skin is warm and dry.   Neurological:      General: No focal deficit present.      Mental Status: She is alert and oriented to person, place, and time. Mental status is at baseline.   Psychiatric:         Mood and Affect: Mood normal.         Behavior:  Behavior normal.         Thought Content: Thought content normal.             Significant Labs: None    Significant Imaging:  none

## 2023-10-30 NOTE — PROGRESS NOTES
[facility] Medicine  Progress Note    Patient Name: Alejandra Strong  MRN: [id number]  Patient Class: IP- Inpatient   Admission Date: 10/19/2023  Length of Stay: 10 days  Attending Physician: Shanda Moss DO  Primary Care Provider: Mer, Primary Doctor        Subjective:     Principal Problem:Atrial fibrillation with rapid ventricular response        HPI:  Patient is a 63-year-old female who has  has a past medical history of Anticoagulant long-term use, Bipolar disorder unspecified, CHF (congestive heart failure) reduced ejection fraction, CKD 3 (chronic kidney disease), Diabetes mellitus type 2, Hypertension, Hypothyroidism, unspecified, and Paroxysmal atrial fibrillation.     Patient initially presented to [facility] with complaints of visual and auditory hallucinations.  She was noted to be in AFib/flutter with RVR.  She was admitted to the hospital under pec her rate was controlled with amiodarone, Toprol-XL 50 mg a day and she was discharged to [facility] for further treatment of her bipolar disorder.  She she was discharged 10/11/2023 to [facility].  She is been seen by PCP and Psychiatry there until today when she was noted to have significant swelling in her legs and feet and sent under a pec/CEC for evaluation.  In the emergency department she was noted to be in AFib/flutter rate of 114.  She was also noted to have potassium of 2.7, calcium of 5.4 corrected to 7.2 and a BNP at 9:12 a.m. which is down from 1993 on October 10th.  She was given IV Lasix calcium and potassium in the emergency department.  Her initial troponin was negative.  Patient is seen lethargic but arousable.  Knows she is in the hospital, the date, as well as her birth date.  She denies any chest pain or shortness a breath.    She is admitted under a pec/CEC will need a sitter and will consult psych for help with her meds in a.m.      Overview/Hospital Course:  Patient admitted  cardiology and Psychiatry were consulted.  Patient was begun on amiodarone drip for control for AFib with RVR.  Meds were adjusted per Psychiatry.    Patient being seen by Cardiology.  She was started on amnio drip to attempted controlled rate.  Overnight her heart rate remained high and her blood pressure this morning was low cardiology will be evaluating her soon.    Seen by cardiology and low blood pressure was started on digoxin load for control of heart rate.  Reviewing her records seems that patient has had CKD for several years and likely we will need continued diuresis.    With loading dose of amiodarone, and digoxin patient's heart rate controlled.  Seen again by psychiatry who felt that adjust medications as appropriate but that she was no longer at risk in PEC/CPC was canceled.  Patient is weak and debilitated and therefore seen by PT and OT with recommendation for skilled nursing placement prior to returning home.  Patient would like to go to skilled nursing facility closer to her home near [place] therefore order was placed for social work to attempt to place her in a facility there.    Actively participate with physical therapy and occupational therapy with improved strength.  Patient reports she is working well with therapy and would like to return closer to home.    SNF placement pending      Interval History: No acute events overnight.  Seen and examined without any family present.  Denies any complaints, only stated that she would like to go home. SNF placement is pending      Review of Systems  Objective:     Vital Signs (Most Recent):  Temp: 98 °F (36.7 °C) (10/30/23 1206)  Pulse: 61 (10/30/23 1206)  Resp: 20 (10/30/23 1206)  BP: 120/63 (10/30/23 1206)  SpO2: 98 % (10/30/23 1206) Vital Signs (24h Range):  Temp:  [97.5 °F (36.4 °C)-98.3 °F (36.8 °C)] 98 °F (36.7 °C)  Pulse:  [57-73] 61  Resp:  [16-20] 20  SpO2:  [95 %-99 %] 98 %  BP: (111-141)/(56-73) 120/63     Weight: 55 kg (121 lb 4.1  oz)  Body mass index is 21.48 kg/m².    Intake/Output Summary (Last 24 hours) at 10/30/2023 1228  Last data filed at 10/30/2023 1105  Gross per 24 hour   Intake 240 ml   Output 600 ml   Net -360 ml         Physical Exam  Vitals and nursing note reviewed.   Constitutional:       Appearance: Normal appearance. She is ill-appearing (Chronically).   HENT:      Head: Normocephalic.      Mouth/Throat:      Mouth: Mucous membranes are moist.   Eyes:      Conjunctiva/sclera: Conjunctivae normal.   Cardiovascular:      Rate and Rhythm: Normal rate and regular rhythm.      Pulses: Normal pulses.      Heart sounds: Normal heart sounds.   Pulmonary:      Effort: Pulmonary effort is normal. No respiratory distress.      Breath sounds: Normal breath sounds. No wheezing or rhonchi.      Comments: On room air  Abdominal:      General: Bowel sounds are normal.      Palpations: Abdomen is soft.      Tenderness: There is no abdominal tenderness.   Musculoskeletal:         General: Normal range of motion.      Cervical back: Normal range of motion and neck supple.   Skin:     General: Skin is warm and dry.   Neurological:      General: No focal deficit present.      Mental Status: She is alert and oriented to person, place, and time. Mental status is at baseline.   Psychiatric:         Mood and Affect: Mood normal.         Behavior: Behavior normal.         Thought Content: Thought content normal.             Significant Labs: None    Significant Imaging:  none      Assessment/Plan:      * Atrial fibrillation with rapid ventricular response  Patient with Long standing persistent (>12 months) atrial fibrillation which is uncontrolled currently with Beta Blocker and Amiodarone. Patient is currently in atrial fibrillation.[id number] Score: 1. HASBLED Score: . Anticoagulation indicated. Anticoagulation done with Xarelto     Continue outpatient amiodarone and metoprolol  Appreciate cardiology consult.      Discussing with Cardiology who  recommended loading dose of dig of 0.25 mg q.6 hours x4 doses  Rate control.  Patient is remaining on amiodarone load and is on dig 0.125 mg daily, but worsening renal function we will change to 3 days a week    Finishing the loading dose of amiodarone and beta-blocker patient's heart rate is well-controlled.  Discussed cardiology who recommends dc  digoxin.      Stage 3b chronic kidney disease  In reviewing patient's records her creatinine has typically run somewhere between 2 and 2.3 at least as far back as 2018.  I suspect with her decreased cardiac function and volume overload that she is returning to her baseline function      Acute on chronic combined systolic and diastolic congestive heart failure  Patient is identified as having Combined Systolic and Diastolic heart failure that is Acute on chronic. CHF is currently uncontrolled due to Continued edema of extremities. Latest ECHO performed and demonstrates- Results for orders placed during the hospital encounter of 10/08/23    Echo    Interpretation Summary    Rhythm is atrial flutter.    Left Ventricle: The left ventricle is normal in size. Normal wall thickness. There is moderately reduced systolic function with a visually estimated ejection fraction of 35 - 40%.    Left Atrium: Left atrium is moderately dilated.    Right Ventricle: Mild right ventricular enlargement. Systolic function is mildly reduced.    Right Atrium: Right atrium is moderately dilated.    Aortic Valve: The aortic valve is a trileaflet valve. There is mild aortic valve sclerosis.    Mitral Valve: There is mild to moderate regurgitation.    Tricuspid Valve: There is moderate regurgitation.    Pulmonic Valve: There is mild regurgitation.    Pulmonary Artery: The estimated pulmonary artery systolic pressure is 51 mmHg.    IVC/SVC: Elevated venous pressure at 15 mmHg.    Pericardium: There is a trivial effusion.  . Continue Beta Blocker and Furosemide and monitor clinical status  closely. Monitor on telemetry. Patient is off CHF pathway.  Monitor strict Is&Os and daily weights.  Place on fluid restriction of 1.5 L. Cardiology has not been any consulted. Continue to stress to patient importance of self efficacy and  on diet for CHF. Last BNP reviewed- and noted below   Recent Labs   Lab 10/27/23  3043   *   .  Symptoms markedly improving.  Patient with minimal shortness of breath.  Continue with titration of diuretics.    Hypothyroidism  Continue home dose levothyroxine    Essential hypertension  Changed back to Toprol-XL as blood pressure is now improved  Normotensive   Monitor BP        Dyslipidemia  Continueoutpatient statin dose      Bipolar disorder  Patient admitted from [facility]  She has a diagnosis of bipolar was there under CEC/PEC is reportedly homeless and there prior to admission had auditory and visual hallucinations currently she was hyperactive with delusions and unable to understand her situation or take her medications.  She was started on Depakote  mg b.i.d. Zoloft 100 mg daily trazodone 100 mg q.h.s. was just recently started on Risperdal 1 mg p.o. b.i.d. for visual hallucinations.  At the time they were considering nursing home versus group home placement    Appreciate telepsych consult meds were adjusted per their recommendations    Seen by telepsych again today 10/23 with recommendations of medication adjustments reviewed.  No longer needed CEC  Continue current meds      VTE Risk Mitigation (From admission, onward)         Ordered     rivaroxaban tablet 15 mg  With dinner         10/20/23 0938     Reason for No Pharmacological VTE Prophylaxis  Once        Question:  Reasons:  Answer:  Already adequately anticoagulated on oral Anticoagulants    10/19/23 1750     IP VTE HIGH RISK PATIENT  Once         10/19/23 1750     Place sequential compression device  Until discontinued         10/19/23 1750                Discharge  Planning   EBEN:      Code Status: Full Code   Is the patient medically ready for discharge?: Yes    Reason for patient still in hospital (select all that apply): Patient trending condition and Pending disposition  Discharge Plan A: (P) Skilled Nursing Facility                  Shanda Moss DO  Department of Hospital Medicine   [facility] ([facility])

## 2023-10-30 NOTE — PLAN OF CARE
Pt tolerated session well. Pt  in good spirits this date. Bed Mobility, Sup>EOB, and sit to stand from EOB with RW all SBA. Pt ambulated 300 ft x 1 trial and 120 ft x 1 trials with Min A with a standing rest break between trials. Pt completed a stand> sit transfer to bedside chair with CGA with a RW.    ADL: Pt completed simple gromming of hair brushing with set up assistance. Pt completed simple grooming of washing face with a wash cloth with Min A.    D/C rec: Moderate Intensity Therapy

## 2023-10-30 NOTE — PT/OT/SLP PROGRESS
"Occupational Therapy   Treatment    Name: Alejandra Strong  MRN: [id number]  Admitting Diagnosis:  Atrial fibrillation with rapid ventricular response       Recommendations:     Discharge Recommendations: Moderate Intensity Therapy  Discharge Equipment Recommendations:  walker, rolling, shower chair  Barriers to discharge:  Decreased caregiver support    Assessment:     Alejandra Strong is a 63 y.o. female with a medical diagnosis of Atrial fibrillation with rapid ventricular response.  She presents with self-care debility. Performance deficits affecting function are weakness, impaired endurance, impaired self care skills, impaired balance, gait instability, impaired functional mobility, impaired cognition, decreased upper extremity function, decreased lower extremity function, decreased ROM, decreased safety awareness, impaired cardiopulmonary response to activity.     Rehab Prognosis:  Good; patient would benefit from acute skilled OT services to address these deficits and reach maximum level of function.       Plan:     Patient to be seen 2 x/week to address the above listed problems via self-care/home management, therapeutic activities, therapeutic exercises  Plan of Care Expires: 11/04/23  Plan of Care Reviewed with: patient    Subjective     Chief Complaint: None - Wants to go home  Patient/Family Comments/goals: Pt stated, "I get to see my old man soon"  Pain/Comfort:  Pain Rating 1: 0/10  Pain Rating Post-Intervention 1: 0/10    Objective:     Communicated with: pt's nurse, Nirmala, and completed a chart review via Epic prior to session.  Patient found HOB elevated with peripheral IV, telemetry, PureWick, bed alarm upon OT entry to room.    General Precautions: Standard, fall    Orthopedic Precautions:N/A  Braces: N/A  Respiratory Status: Room air     Occupational Performance:   Bed Mobility:    Patient completed Rolling/Turning to Left with  stand by assistance  Patient completed Supine to Sit with " stand by assistance     Functional Mobility/Transfers:  Patient completed Sit <> Stand Transfer with stand by assistance  with  rolling walker   Functional Mobility: Pt ambulated  300 ft x 1 trial and 120 ft x 1 trials with Min A with RW for increase in activity tolerance for ADL completion  Pt mod verbal cues this date to shift weight over toes. Posterior lean during ambulation observed.  Standing rest break taken between trials.  Patient completed a stand>sit transfer to bedside chair with CGA with RW.    Activities of Daily Living:  Grooming: Pt completed simple gromming of hair brushing with set up assistance. Pt completed simple grooming of washing face with a wash cloth with Min A    [facility] 6 Click ADL: 17    Treatment & Education:  Pt tolerated session well. Pt pleasant and compliant. Pt found with HOB elevated. Observed to be bleeding from nose with a minor cut upon entry into room. Pt cleaned up and bandaid placed on nose on cut. Pt educated on benefits to OOB activities and benefits to recovery. Pt states she has been completing her BUE ROM therex  throughout the day. Pt educated on call don't fall policy, encouraged use of call button to meet needs. Pt in agreement with POC.     Patient left up in chair with all lines intact, call button in reach, and chair alarm on    GOALS:   Multidisciplinary Problems       Occupational Therapy Goals          Problem: Occupational Therapy    Goal Priority Disciplines Outcome Interventions   Occupational Therapy Goal     OT, PT/OT Ongoing, Progressing    Description: LTG'S TO BE MET IN 14 DAYS (11/4/23)    1)  PATIENT WILL PERFORM UB DRESSING WITH SBA DUE TO PATIENT LIVES ALONE IN [facility] WITH DECREASED CAREGIVER SUPPORT.    2)  PATIENT WILL PERFORM LB DRESSING WITH  SBA DUE TO PATIENT LIVES ALONE IN [facility] WITH DECREASED CAREGIVER SUPPORT.    3)  PATIENT WILL PERFORM TOILET T/F WITH  SBA DUE TO PATIENT LIVES ALONE IN [facility] WITH DECREASED  CAREGIVER SUPPORT.    4)  PATIENT WILL PERFORM STANDING AT SINK X5-X10 MIN WITH (S) WITH RW IF NEEDED TO PERFORM SIMPLE GROOMING/HYGIENE WITH NO LOB.                         Time Tracking:     OT Date of Treatment: 10/30/23  OT Start Time: 0958  OT Stop Time: 1021  OT Total Time (min): 23 min    Billable Minutes:Self Care/Home Management 8  Therapeutic Activity 15    OT/JIE: OT      SHIVAM Delacruz  10/30/2023

## 2023-10-30 NOTE — ASSESSMENT & PLAN NOTE
Patient admitted from [facility]  She has a diagnosis of bipolar was there under CEC/PEC is reportedly homeless and there prior to admission had auditory and visual hallucinations currently she was hyperactive with delusions and unable to understand her situation or take her medications.  She was started on Depakote  mg b.i.d. Zoloft 100 mg daily trazodone 100 mg q.h.s. was just recently started on Risperdal 1 mg p.o. b.i.d. for visual hallucinations.  At the time they were considering nursing home versus group home placement    Appreciate telepsych consult meds were adjusted per their recommendations    Seen by telepsych again today 10/23 with recommendations of medication adjustments reviewed.  No longer needed CEC  Continue current meds

## 2023-10-30 NOTE — ASSESSMENT & PLAN NOTE
Patient with Long standing persistent (>12 months) atrial fibrillation which is uncontrolled currently with Beta Blocker and Amiodarone. Patient is currently in atrial fibrillation.[id number] Score: 1. HASBLED Score: . Anticoagulation indicated. Anticoagulation done with Xarelto     Continue outpatient amiodarone and metoprolol  Appreciate cardiology consult.      Discussing with Cardiology who recommended loading dose of dig of 0.25 mg q.6 hours x4 doses  Rate control.  Patient is remaining on amiodarone load and is on dig 0.125 mg daily, but worsening renal function we will change to 3 days a week    Finishing the loading dose of amiodarone and beta-blocker patient's heart rate is well-controlled.  Discussed cardiology who recommends dc  digoxin.

## 2023-10-30 NOTE — PROGRESS NOTES
[facility] - Telemetry ([facility])  Adult Nutrition  Progress Note    SUMMARY       Recommendations  1. Continue on cardiac diet   2. Weigh daily  3. Meal set-up    Goals   1. Pt will continue to consume 100% of EEN/EPN by RD f/u.   2. Pt will maintain stable weight RD f/u.  Nutrition Goal Status: new  Communication of RD Recs:  (POC;sticky note)    Assessment and Plan    Malnutrition Type:  Context: acute illness or injury  Level: severe    Related to (etiology):   Physiological causes increasing nutrient needs (CHF, CKD)     Signs and Symptoms (as evidenced by):   >5% wt loss in 1 month    Malnutrition Characteristic Summary:  Weight Loss (Malnutrition): 5% in 1 month (7% in 1 month)  Subcutaneous Fat (Malnutrition): moderate depletion  Muscle Mass (Malnutrition): moderate depletion      Interventions/Recommendations (treatment strategy):  1.Low sodium/fat modified diet  2.Collbration of care with other providers   3. Meal set-up    Nutrition Diagnosis Status:   New    Malnutrition Assessment  Malnutrition Context: acute illness or injury  Malnutrition Level: severe  Skin (Micronutrient): bruised (Willie Score = 19)  Hair/Scalp (Micronutrient): dull, plucked easily       Weight Loss (Malnutrition): 5% in 1 month (7% in 1 month)  Subcutaneous Fat (Malnutrition): moderate depletion  Muscle Mass (Malnutrition): moderate depletion   Orbital Region (Subcutaneous Fat Loss): moderate depletion   [place] (Muscle Loss): moderate depletion                 Reason for Assessment    Reason For Assessment: length of stay  Diagnosis: cardiac disease  Relevant Medical History: HTN, Dyslipidemia, CHF, CKD3,T2DM  Interdisciplinary Rounds: did not attend    General Information Comments: 62 yo female with active prinicpal problem atrial fibrillation with rapid ventricular response. Current wt 121# and BMI 21.48 (normal). PO intake is 100%. LBM is 10/29. NFPE was not performed as it was not medically appopriate at this time. Pt had a  "PEC/CEC upon admit. Although sitter is no longer in place didnt want to arouse. Pt was sitting up with bedside table eating a la nena cracker upon entering the room.  Visually noticed moderate depletions around orbitals and temporal areas.  Nutrition Discharge Planning: D/c on liberalized diet    Nutrition Risk Screen    Nutrition Risk Screen: no indicators present    Nutrition/Diet History    Patient Reported Diet/Restrictions/Preferences: general  Spiritual, Cultural Beliefs, Buddhism Practices, Values that Affect Care: no  Food Allergies: NKFA  Factors Affecting Nutritional Intake: None identified at this time, impaired cognitive status/motor control    Anthropometrics    Temp: 98 °F (36.7 °C)  Height Method: Stated  Height: 5' 3" (160 cm)  Height (inches): 63 in  Weight Method: Bed Scale  Weight: 54.9 kg (121 lb)  Weight (lb): 121 lb  Ideal Body Weight (IBW), Female: 115 lb  % Ideal Body Weight, Female (lb): 105.22 %  BMI (Calculated): 21.4  BMI Grade: 18.5-24.9 - normal     Wt Readings from Last 15 Encounters:   10/30/23 54.9 kg (121 lb)   10/09/23 67.6 kg (149 lb)   10/05/23 59.4 kg (131 lb)     Lab/Procedures/Meds    Pertinent Labs Reviewed: reviewed  Pertinent Medications Reviewed: reviewed    Lab Results   Component Value Date    WBC 6.33 10/29/2023    HGB 11.1 (L) 10/29/2023    HCT 39.0 10/29/2023    MCV 87 10/29/2023     (L) 10/29/2023       BMP  Lab Results   Component Value Date     10/29/2023    K 3.5 10/29/2023    CL 94 (L) 10/29/2023    CO2 29 10/29/2023    BUN 30 (H) 10/29/2023    CREATININE 1.7 (H) 10/29/2023    CALCIUM 8.1 (L) 10/29/2023    ANIONGAP 16 10/29/2023    EGFRNORACEVR 33 (A) 10/29/2023     Lab Results   Component Value Date    HGBA1C 6.1 (H) 10/19/2023     Scheduled Meds:   [START ON 11/5/2023] amiodarone  200 mg Oral Daily    amiodarone  400 mg Oral Daily    atorvastatin  20 mg Oral Daily    divalproex  250 mg Oral Daily    divalproex  500 mg Oral QHS    levothyroxine  50 " mcg Oral Before breakfast    melatonin  3 mg Oral Nightly    metoprolol succinate  50 mg Oral Daily    polyethylene glycol  17 g Oral Daily    rivaroxaban  15 mg Oral Daily with dinner    senna-docusate 8.6-50 mg  1 tablet Oral BID    sertraline  50 mg Oral Daily    torsemide  40 mg Oral Daily    traZODone  100 mg Oral QHS     Continuous Infusions:  PRN Meds:.acetaminophen, acetaminophen, dextrose 10%, dextrose 10%, glucagon (human recombinant), glucose, glucose, hydrALAZINE, naloxone, nitroGLYCERIN, OLANZapine, ondansetron, simethicone, sodium chloride 0.9%    Estimated/Assessed Needs    Weight Used For Calorie Calculations: 54.9 kg (121 lb)  Energy Calorie Requirements (kcal): 1039-4518 kcal/kg (22-25 kcal CHF)  Energy Need Method: Kcal/kg  Protein Requirements: 33-44 g/kcal (CKD Diabetic 0.6-0.8 g/kcal)  Weight Used For Protein Calculations: 54.9 kg (121 lb)  Fluid Requirements (mL): 1200 or per MD/NP     RDA Method (mL): 1200  CHO Requirement: 150-163 (3759-4348/8 g/kcal)      Nutrition Prescription Ordered    Current Diet Order: 1.cardiac    Evaluation of Received Nutrient/Fluid Intake    I/O: Net Since Admit : -17,110 mL  Energy Calories Required: meeting needs  Protein Required: meeting needs  Fluid Required: not meeting needs  Total Fluid Intake (mL/kg): 240 mL  % Intake of Estimated Energy Needs: 75 - 100 %  % Meal Intake: 75 - 100 %    Nutrition Risk    Level of Risk/Frequency of Follow-up: high (x2 weekly)     Monitor and Evaluation    Food and Nutrient Intake: energy intake, food and beverage intake  Food and Nutrient Adminstration: diet order  Knowledge/Beliefs/Attitudes: food and nutrition knowledge/skill, beliefs and attitudes  Anthropometric Measurements: weight change, body mass index  Nutrition-Focused Physical Findings: overall appearance     Nutrition Follow-Up    RD Follow-up?: Yes  Cara Guillen, Dietetic Intern

## 2023-10-30 NOTE — PLAN OF CARE
GOOD PARTICIPATION TODAY. PT PROGRESSING WITH FUNCTIONAL MOBILITY AND GAIT. PT REQUIRES SBA FOR BED MOBILITY AND TFS. PT  FT X 1 TRIAL  FT X 1 TRIAL WITH RW AND PATRICK FOR SAFETY CUEING AND CONTROL OF WALKER. P.T. RECOMMENDS MODERATE INTENSITY THERAPY AT DISCHARGE.

## 2023-10-30 NOTE — PLAN OF CARE
Recommendations  1. Continue on cardiac diet   2. Weigh daily  3. Meal set-up     Goals   1. Pt will continue to consume 100% of EEN/EPN by RD f/u.   2. Pt will maintain stable weight RD f/u.  Nutrition Goal Status: new  Communication of RD Recs:  (POC;sticky note)   Cara Guillen, Dietetic Intern

## 2023-10-30 NOTE — ASSESSMENT & PLAN NOTE
Malnutrition Type:  Context: acute illness or injury  Level: severe    Related to (etiology):   Physiological causes increasing nutrient needs (CHF, CKD)     Signs and Symptoms (as evidenced by):   >5% wt loss in 1 month    Malnutrition Characteristic Summary:  Weight Loss (Malnutrition): 5% in 1 month (7% in 1 month)  Subcutaneous Fat (Malnutrition): moderate depletion  Muscle Mass (Malnutrition): moderate depletion      Interventions/Recommendations (treatment strategy):  1.Low sodium/fat modified diet  2.Collbration of care with other providers   3. Meal set-up    Nutrition Diagnosis Status:   New

## 2023-10-30 NOTE — PLAN OF CARE
Patient accepted by [facility] for transfer under [facility] care tomorrow.       10/30/23 1187   Post-Acute Status   Post-Acute Authorization Placement   Post-Acute Placement Status Pending Bed Availability   Discharge Plan   Discharge Plan A Skilled Nursing Facility

## 2023-10-31 VITALS
DIASTOLIC BLOOD PRESSURE: 77 MMHG | TEMPERATURE: 98 F | BODY MASS INDEX: 24.02 KG/M2 | HEIGHT: 63 IN | OXYGEN SATURATION: 97 % | WEIGHT: 135.56 LBS | SYSTOLIC BLOOD PRESSURE: 130 MMHG | HEART RATE: 68 BPM | RESPIRATION RATE: 16 BRPM

## 2023-10-31 PROCEDURE — 97530 THERAPEUTIC ACTIVITIES: CPT

## 2023-10-31 PROCEDURE — 25000003 PHARM REV CODE 250: Performed by: INTERNAL MEDICINE

## 2023-10-31 PROCEDURE — 97535 SELF CARE MNGMENT TRAINING: CPT

## 2023-10-31 PROCEDURE — 25000003 PHARM REV CODE 250: Performed by: STUDENT IN AN ORGANIZED HEALTH CARE EDUCATION/TRAINING PROGRAM

## 2023-10-31 PROCEDURE — 97116 GAIT TRAINING THERAPY: CPT

## 2023-10-31 RX ADMIN — SERTRALINE HYDROCHLORIDE 50 MG: 50 TABLET ORAL at 09:10

## 2023-10-31 RX ADMIN — DIVALPROEX SODIUM 250 MG: 250 TABLET, DELAYED RELEASE ORAL at 09:10

## 2023-10-31 RX ADMIN — POLYETHYLENE GLYCOL 3350 17 G: 17 POWDER, FOR SOLUTION ORAL at 09:10

## 2023-10-31 RX ADMIN — METOPROLOL SUCCINATE 50 MG: 50 TABLET, EXTENDED RELEASE ORAL at 09:10

## 2023-10-31 RX ADMIN — SENNOSIDES AND DOCUSATE SODIUM 1 TABLET: 8.6; 5 TABLET ORAL at 09:10

## 2023-10-31 RX ADMIN — LEVOTHYROXINE SODIUM 50 MCG: 50 TABLET ORAL at 05:10

## 2023-10-31 RX ADMIN — ATORVASTATIN CALCIUM 20 MG: 10 TABLET, FILM COATED ORAL at 09:10

## 2023-10-31 RX ADMIN — TORSEMIDE 40 MG: 10 TABLET ORAL at 09:10

## 2023-10-31 RX ADMIN — AMIODARONE HYDROCHLORIDE 400 MG: 200 TABLET ORAL at 09:10

## 2023-10-31 NOTE — PT/OT/SLP PROGRESS
Physical Therapy Treatment    Patient Name:  Alejandra Strong   MRN:  [id number]    Recommendations:     Discharge Recommendations: Moderate Intensity Therapy  Discharge Equipment Recommendations: to be determined by next level of care  Barriers to discharge: None    Assessment:     Alejandra Strong is a 63 y.o. female admitted with a medical diagnosis of Atrial fibrillation with rapid ventricular response.  She presents with the following impairments/functional limitations: weakness, impaired endurance, impaired functional mobility, gait instability, impaired balance, decreased safety awareness, decreased lower extremity function, decreased coordination.    Rehab Prognosis: Good; patient would benefit from acute skilled PT services to address these deficits and reach maximum level of function.    Recent Surgery: * No surgery found *     Plan:     During this hospitalization, patient to be seen 3 x/week to address the identified rehab impairments via gait training, therapeutic activities, therapeutic exercises and progress toward the following goals:    Plan of Care Expires:  11/04/23    Subjective     Chief Complaint: NONE, EAGER TO WALK  Patient/Family Comments/goals:   Pain/Comfort:  Pain Rating 1: 0/10      Objective:     Communicated with NURSE prior to session.  Patient found supine with peripheral IV, PureWick, telemetry upon PT entry to room.     General Precautions: Standard, fall  Orthopedic Precautions: N/A  Braces: N/A  Respiratory Status: Room air     Functional Mobility:  Bed Mobility:     Rolling Left:  stand by assistance  Scooting: stand by assistance  Supine to Sit: minimum assistance  Transfers:     Sit to Stand:  contact guard assistance with rolling walker  Bed to Chair: contact guard assistance with  rolling walker  using  Step Transfer  Toilet Transfer: contact guard assistance with  rolling walker  using  Step Transfer  Gait: PT ' WITH RW AND CGA, SLOW PACE, POSTERIOR VITOR BUT NO  "GROSS LOB  Balance: GOOD SITTING BALANCE, FAIR DYNAMIC BALANCE DURING GAIT  TOTAL ASSIST FOR CHANGING SOILED BRIEF IN SUPINE, PT ABLE TO BLE BRIDGE WITH DIRECTION ONLY TO COMPLETE BRIEF CHANGE  PT STOOD AT SINK WITH SBA TO WASH/DRY HANDS, COMB HAIR    AM-PAC 6 CLICK MOBILITY  Turning over in bed (including adjusting bedclothes, sheets and blankets)?: 4  Sitting down on and standing up from a chair with arms (e.g., wheelchair, bedside commode, etc.): 3  Moving from lying on back to sitting on the side of the bed?: 3  Moving to and from a bed to a chair (including a wheelchair)?: 3  Need to walk in hospital room?: 3  Climbing 3-5 steps with a railing?: 1  Basic Mobility Total Score: 17     Treatment & Education:  PT EDUCATION:  - ROLE OF P.T. AND POC IN ACUTE CARE HOSPITAL SETTING  - RW USE AND SAFETY DURING TF'S AND GAIT  - ENCOURAGED TO INCREASE TIME OOB IN CHAIR TO TOLERANCE   - TO CONTINUE THERAPUETIC EXERCISES THROUGHOUT THE DAY TO INCREASE ACTIVITY TOLERANCE AND DECREASE RISK FOR PNEUMONIA AND BLOOD CLOTS: HIP FLEX/EXT, HIP ABD/ADD, QUAD SET, HEEL SLIDE, AP  - RISK FOR FALLS DUE TO GENERALIZED WEAKNESS, EDUCATED ON "CALL DON'T FALL", ENCOURAGED TO CALL FOR ASSISTANCE WITH ALL NEEDS SUCH AS BED<>CHAIR TRANSFERS OR TRIPS TO BATHROOM, PT AGREEABLE TO SAFETY PRECAUTIONS    Patient left up in chair with all lines intact, call button in reach, chair alarm on, and NURSE notified.. PT SET UP FOR BREAKFAST    GOALS:   Multidisciplinary Problems       Physical Therapy Goals          Problem: Physical Therapy    Goal Priority Disciplines Outcome Goal Variances Interventions   Physical Therapy Goal     PT, PT/OT Ongoing, Progressing     Description: All LTGs to be met by 11/4/23    Bed mobility I  Transfers I  Gait of at least 150' mod I   Pt will increase AMPAC score by 2 points to progress functional mobility  Pt will tolerate > 10 min of functional activity to progress gross functional mobility                    "       Time Tracking:     PT Received On: 10/31/23  PT Start Time: 0715     PT Stop Time: 0740  PT Total Time (min): 25 min     Billable Minutes: Gait Training 10 and Therapeutic Activity 15    Treatment Type: Treatment  PT/PTA: PT     Number of PTA visits since last PT visit: 0     10/31/2023

## 2023-10-31 NOTE — PT/OT/SLP PROGRESS
"Occupational Therapy   Treatment    Name: Alejandra Strong  MRN: [id number]  Admitting Diagnosis:  Atrial fibrillation with rapid ventricular response       Recommendations:     Discharge Recommendations: Moderate Intensity Therapy  Discharge Equipment Recommendations:  to be determined by next level of care  Barriers to discharge:  Decreased caregiver support (homelessness)    Assessment:     Alejandra Strong is a 63 y.o. female with a medical diagnosis of Atrial fibrillation with rapid ventricular response.  She presents with the following performance deficits affecting function are weakness, impaired endurance, impaired self care skills, impaired functional mobility, impaired balance, impaired cardiopulmonary response to activity, decreased safety awareness, impaired cognition.     Rehab Prognosis:  Fair; patient would benefit from acute skilled OT services to address these deficits and reach maximum level of function.       Plan:     Patient to be seen 2 x/week to address the above listed problems via self-care/home management, therapeutic activities, therapeutic exercises  Plan of Care Expires: 11/04/23  Plan of Care Reviewed with: patient    Subjective     Chief Complaint: Reported "I am going home today. I get to see my hunny."  Patient/Family Comments/goals: go home  Pain/Comfort:  Pain Rating 1: 0/10    Objective:     Communicated with: NurseNirmala, prior to session.  Patient found supine with peripheral IV, PureWick, telemetry upon OT entry to room.    General Precautions: Standard, fall    Orthopedic Precautions:N/A  Braces: N/A  Respiratory Status: Room air     Occupational Performance:     Bed Mobility:    Patient completed Supine to Sit with contact guard assistance     Functional Mobility/Transfers:  Patient completed Sit <> Stand Transfer with contact guard assistance  with  rolling walker   Patient completed Bed <> Chair Transfer using Step Transfer technique with contact guard assistance " with rolling walker  Patient completed Toilet Transfer Step Transfer technique with contact guard assistance with  rolling walker and grab bars  Functional Mobility: Patient completed x200ft functional mobility with RW and CGA to increase dynamic standing balance and activity tolerance needed for ADL completion.  Provided with v/c for technique with all transfers to increase safety and independence.  Cueing for environmental navigation.    Activities of Daily Living:  Grooming: setup completed standing at sink side with max cueing for technique to improve overall task quality.  Toileting: moderate assistance from bathroom commode with A for LB dressing management.    [facility] 6 Click ADL: 16    Treatment & Education:  Patient tolerated intervention well overall. Education carryover from previous sessions limited, requiring significant reinforcement. Educated on benefits of OOB activity and importance of calling for A as she remains high fall risk. Encouraged completion of B UE AROM therex throughout the day to increase functional strength and activity tolerance needed for ADL completion. Requires A with container management to eat breakfast from bedside chair/tray.    Patient stated understanding and in agreement with POC.    Patient left up in chair with all lines intact, call button in reach, and chair alarm on    GOALS:   Multidisciplinary Problems       Occupational Therapy Goals          Problem: Occupational Therapy    Goal Priority Disciplines Outcome Interventions   Occupational Therapy Goal     OT, PT/OT Ongoing, Progressing    Description: LTG'S TO BE MET IN 14 DAYS (11/4/23)    1)  PATIENT WILL PERFORM UB DRESSING WITH SBA DUE TO PATIENT LIVES ALONE IN WOMAN'S custodial WITH DECREASED CAREGIVER SUPPORT.    2)  PATIENT WILL PERFORM LB DRESSING WITH  SBA DUE TO PATIENT LIVES ALONE IN WOMAN'S custodial WITH DECREASED CAREGIVER SUPPORT.    3)  PATIENT WILL PERFORM TOILET T/F WITH  SBA DUE TO PATIENT LIVES ALONE IN  WOMAN'S SHELTER WITH DECREASED CAREGIVER SUPPORT.    4)  PATIENT WILL PERFORM STANDING AT SINK X5-X10 MIN WITH (S) WITH RW IF NEEDED TO PERFORM SIMPLE GROOMING/HYGIENE WITH NO LOB.                         Time Tracking:     OT Date of Treatment: 10/31/23  OT Start Time: 0725  OT Stop Time: 0750  OT Total Time (min): 25 min    Billable Minutes:Self Care/Home Management 10  Therapeutic Activity 15    Radha Britt, OT  10/31/2023

## 2023-10-31 NOTE — PLAN OF CARE
[facility] - Telemetry (Hospital)  Discharge Final Note    Primary Care Provider: No, Primary Doctor    Expected Discharge Date: 10/31/2023    Final Discharge Note (most recent)       Final Note - 10/31/23 0932          Final Note    Assessment Type Final Discharge Note (P)      Anticipated Discharge Disposition Intermediate Care Facility for assisted or Supportive Care (P)         Post-Acute Status    Post-Acute Authorization Placement (P)      Post-Acute Placement Status Set-up Complete/Auth obtained (P)    [facility]                    Important Message from Medicare             Contact Info       Norman Hernandes MD   Specialty: Cardiology    [address]  [place] [address]   Phone: [phone]       Next Steps: Follow up

## 2023-10-31 NOTE — PLAN OF CARE
Problem: Violence Risk or Actual  Goal: Anger and Impulse Control  Outcome: Met     Problem: Adult Inpatient Plan of Care  Goal: Plan of Care Review  Outcome: Met  Goal: Patient-Specific Goal (Individualized)  Outcome: Met  Goal: Absence of Hospital-Acquired Illness or Injury  Outcome: Met  Goal: Optimal Comfort and Wellbeing  Outcome: Met  Goal: Readiness for Transition of Care  Outcome: Met     Problem: Skin Injury Risk Increased  Goal: Skin Health and Integrity  Outcome: Met     Problem: Fatigue  Goal: Improved Activity Tolerance  Outcome: Met     Problem: Fall Injury Risk  Goal: Absence of Fall and Fall-Related Injury  Outcome: Met

## 2023-10-31 NOTE — PLAN OF CARE
Problem: Adult Inpatient Plan of Care  Goal: Plan of Care Review  Outcome: Ongoing, Progressing  Goal: Patient-Specific Goal (Individualized)  Outcome: Ongoing, Progressing  Goal: Absence of Hospital-Acquired Illness or Injury  Outcome: Ongoing, Progressing  Goal: Optimal Comfort and Wellbeing  Outcome: Ongoing, Progressing  Goal: Readiness for Transition of Care  Outcome: Ongoing, Progressing     Problem: Skin Injury Risk Increased  Goal: Skin Health and Integrity  Outcome: Ongoing, Progressing     Problem: Violence Risk or Actual  Goal: Anger and Impulse Control  Outcome: Ongoing, Progressing

## 2023-10-31 NOTE — PLAN OF CARE
Transferring to [facility] today;  Please call report to nurse on [phone] keys @ [phone].  Transportation picking her up at 10:30am    Discharge paperwork faxed to Staci Lu @ [phone]       10/31/23 0990   Post-Acute Status   Post-Acute Authorization Placement   Post-Acute Placement Status Set-up Complete/Auth obtained   Discharge Plan   Discharge Plan A New Nursing Home placement - long-term care facility

## 2023-10-31 NOTE — DISCHARGE SUMMARY
Contacted pt's mom Thais Macedo to discuss pt's discharge plan  She stated that she reviewed the inpt acute rehab list and she prefers a referral to [place] [address] referral sent for same  [facility] - Telemetry ([facility] Medicine  Discharge Summary      Patient Name: Alejandra Strong  MRN: [id number]  [facility]: [id number]  Patient Class: IP- Inpatient  Admission Date: 10/19/2023  Hospital Length of Stay: 11 days  Discharge Date and Time:  10/31/2023 7:20 AM  Attending Physician: Shanda Moss DO   Discharging Provider: Shanda Moss DO  Primary Care Provider: Mer Primary Doctor    Primary Care Team: [facility] MEDICINE C    HPI:   Patient is a 63-year-old female who has  has a past medical history of Anticoagulant long-term use, Bipolar disorder unspecified, CHF (congestive heart failure) reduced ejection fraction, CKD 3 (chronic kidney disease), Diabetes mellitus type 2, Hypertension, Hypothyroidism, unspecified, and Paroxysmal atrial fibrillation.     Patient initially presented to [facility] with complaints of visual and auditory hallucinations.  She was noted to be in AFib/flutter with RVR.  She was admitted to the hospital under pec her rate was controlled with amiodarone, Toprol-XL 50 mg a day and she was discharged to [facility] for further treatment of her bipolar disorder.  She she was [facility]  She is been seen by PCP and Psychiatry there until today when she was noted to have significant swelling in her legs and feet and sent under a pec/CEC for evaluation.  In the emergency department she was noted to be in AFib/flutter rate of 114.  She was also noted to have potassium of 2.7, calcium of 5.4 corrected to 7.2 and a BNP at 9:12 a.m. which is down from 1993 on October 10th.  She was given IV Lasix calcium and potassium in the emergency department.  Her initial troponin was negative.  Patient is seen lethargic but arousable.  Knows she is in the hospital, the date, as well as her birth date.  She denies any chest pain or shortness a breath.    She is admitted under a pec/CEC will need a sitter and will consult psych for help  with her meds in a.m.      * No surgery found *      Hospital Course:   Patient admitted cardiology and Psychiatry were consulted.  Patient was begun on amiodarone drip for control for AFib with RVR.  Meds were adjusted per Psychiatry.    Patient being seen by Cardiology.  She was started on amnio drip to attempted controlled rate.  Overnight her heart rate remained high and her blood pressure this morning was low cardiology will be evaluating her soon.    Seen by cardiology and low blood pressure was started on digoxin load for control of heart rate.  Reviewing her records seems that patient has had CKD for several years and likely will need continued diuresis.    With loading dose of amiodarone, and digoxin patient's heart rate controlled.  Seen again by psychiatry who felt that adjust medications as appropriate but that she was no longer at risk in PEC/CPC was canceled.  Patient is weak and debilitated and therefore seen by PT and OT with recommendation for skilled nursing placement prior to returning home.  Patient would like to go to skilled nursing facility closer to her [place] near [place] therefore order was placed for social work to attempt to place her in a facility there.    Actively participate with physical therapy and occupational therapy with improved strength.  Patient reports she is working well with therapy and would like to return closer to home.  Family conveyed interest in getting patient to be transitioned to skilled nursing care after she finishes SNF.  Accepted at [facility].    Seen and examined personally on day of discharge.  Patient is sitting up in chair, feels well and denies any complaints other than wanting to home.  Stable for discharge at this time.  Insurance authorization has been received and arrangements made to transfer patient over to SNF facility       Goals of Care Treatment Preferences:  Code Status: Full Code      Consults:   Consults (From admission, onward)          Status  Ordering Provider     Inpatient consult to Spiritual Care  Once        Provider:  (Not yet assigned)    Acknowledged CLAYTON JOHNSON     Inpatient consult to Telemedicine - Psych  Once        Provider:  (Not yet assigned)    Completed MARGARITA MORTENSEN     Inpatient consult to Social Work  Once        Provider:  (Not yet assigned)    Completed MARGARITA MORTENSEN     Inpatient consult to Cardiology  Once        Provider:  Norman Hernandes MD    Completed MARGARITA MORTENSEN     Inpatient consult to Telemedicine - Psych  Once        Provider:  (Not yet assigned)    Completed MARGARITA MORTENSEN            No new Assessment & Plan notes have been filed under this hospital service since the last note was generated.  Service: Hospital Medicine    Final Active Diagnoses:    Diagnosis Date Noted POA    PRINCIPAL PROBLEM:  Atrial fibrillation with rapid ventricular response [I48.91] 10/10/2023 Yes    Severe protein-calorie malnutrition [E43] 10/30/2023 Yes    Stage 3b chronic kidney disease [N18.32] 10/21/2023 Yes    Acute on chronic combined systolic and diastolic congestive heart failure [I50.43] 10/19/2023 Yes    Dyslipidemia [E78.5] 10/08/2023 Yes    Essential hypertension [I10] 10/08/2023 Yes    Hypothyroidism [E03.9] 08/15/2021 Yes    Bipolar disorder [F31.9] 07/27/2021 Yes      Problems Resolved During this Admission:       Discharged Condition: stable    Disposition: Skilled Nursing Facility    Follow Up:   Follow-up Information       Norman Hernandes MD Follow up.    Specialty: Cardiology  Contact information:  [address]  [place] [address] [phone]                           Patient Instructions:      Ambulatory referral/consult to Psychiatry   Standing Status: Future   Referral Priority: Routine Referral Type: Psychiatric   Referral Reason: Specialty Services Required   Requested Specialty: Psychiatry   Number of Visits Requested: 1     Diet Dysphagia Soft   Order Comments: Cardiac      Notify your health care provider if you experience any of the following:  severe uncontrolled pain     Notify your health care provider if you experience any of the following:  difficulty breathing or increased cough     Activity as tolerated       Significant Diagnostic Studies:   CT HEAD WITHOUT CONTRAST     CLINICAL HISTORY:  contusion to head unknown cause;     TECHNIQUE:  Low dose axial CT images obtained throughout the head without intravenous contrast.  Axial, sagittal and coronal reconstructions were performed and interpreted.     DLP: 2427 mGycm     All CT scans at this location are performed using dose optimization techniques as appropriate to a performed exam including the following automated exposure control, adjustment of the mA and/or kV according to patient size and/or use of iterative reconstruction technique     COMPARISON:  CT head 12/14/2018     FINDINGS:  BRAIN: Gray white differentiation is maintained. Periventricular and subcortical white matter changes most compatible with chronic small vessel ischemic disease.  There are faint basal ganglia calcifications.  No hemorrhage. No edema. No mass effect or midline shift.  The posterior fossa and midline structures are unremarkable.     VENTRICLES: Normal in size and configuration.     EXTRA-AXIAL: No abnormal extra-axial collections.     BONES: Calvarium is intact.     SINUSES AND MASTOIDS: Chronic opacification of the left frontal sinus.     Impression:     No appreciable acute intracranial abnormality.     Periventricular and subcortical white matter changes most compatible with chronic small vessel ischemic disease.        Electronically signed by: Hetal Galeano  Date:                                            10/05/2023  Time:                                           17:52           Exam Ended: 10/05/23 17:48 Last Resulted: 10/05/23 17:52           XR ANKLE COMPLETE 3 VIEW LEFT     CLINICAL HISTORY:  Pain, unspecified      TECHNIQUE:  AP, lateral and oblique views of the left ankle were performed.     COMPARISON:  None     FINDINGS:  BONES: No fracture. No dislocation. Ankle mortise is symmetric.        SOFT TISSUES: Nonfocal soft tissue swelling.           Impression:     Soft tissue swelling without appreciable acute osseous abnormality.        Electronically signed by: Hetal Galeano  Date:                                            10/05/2023  Time:                                           17:43           Exam Ended: 10/05/23 17:41 Last Resulted: 10/05/23 17:43             Pending Diagnostic Studies:       Procedure Component Value Units Date/Time    Protein/Creatinine Ratio, Urine [[id number]] Collected: 10/20/23 0100    Order Status: Sent Lab Status: In process Updated: 10/20/23 0143    Specimen: Urine, Clean Catch            Medications:  Reconciled Home Medications:      Medication List        START taking these medications      melatonin 3 mg tablet  Commonly known as: MELATIN  Take 1 tablet (3 mg total) by mouth nightly.     OLANZapine 2.5 MG tablet  Commonly known as: ZyPREXA  Take 1 tablet (2.5 mg total) by mouth every 8 (eight) hours as needed (agitation).     polyethylene glycol 17 gram Pwpk  Commonly known as: GLYCOLAX  Take 17 g by mouth once daily.     torsemide 40 mg Tab  Take 40 mg by mouth once daily.            CHANGE how you take these medications      amiodarone 200 MG Tab  Commonly known as: PACERONE  Take 2 tablets (400 mg) daily until 10/4/2023, starting 10/5/2023 take 1 tablet daily  What changed:   how much to take  how to take this  when to take this  additional instructions  Another medication with the same name was removed. Continue taking this medication, and follow the directions you see here.     * divalproex 500 MG Tbec  Commonly known as: DEPAKOTE  Take 1 tablet (500 mg total) by mouth every evening.  What changed: You were already taking a medication with the same name, and this prescription  was added. Make sure you understand how and when to take each.     * divalproex 250 MG EC tablet  Commonly known as: DEPAKOTE  Take 1 tablet (250 mg total) by mouth once daily.  What changed: when to take this     levothyroxine 50 MCG tablet  Commonly known as: SYNTHROID  Take 1 tablet (50 mcg total) by mouth before breakfast.  What changed:   medication strength  how much to take  when to take this     metoprolol succinate 50 MG 24 hr tablet  Commonly known as: TOPROL-XL  Take 50 mg by mouth.  What changed: Another medication with the same name was removed. Continue taking this medication, and follow the directions you see here.     rivaroxaban 15 mg Tab  Commonly known as: XARELTO  Take 1 tablet (15 mg total) by mouth daily with dinner or evening meal.  What changed:   medication strength  how much to take     sertraline 50 MG tablet  Commonly known as: ZOLOFT  Take 1 tablet (50 mg total) by mouth once daily.  What changed:   medication strength  how much to take           * This list has 2 medication(s) that are the same as other medications prescribed for you. Read the directions carefully, and ask your doctor or other care provider to review them with you.                CONTINUE taking these medications      atorvastatin 20 MG tablet  Commonly known as: LIPITOR  Take 1 tablet (20 mg total) by mouth once daily.     traZODone 100 MG tablet  Commonly known as: DESYREL  Take 1 tablet (100 mg total) by mouth every evening.            STOP taking these medications      benazepriL 5 MG tablet  Commonly known as: LOTENSIN     diltiaZEM 180 MG 24 hr capsule  Commonly known as: CARDIZEM CD     EScitalopram oxalate 10 MG tablet  Commonly known as: LEXAPRO     furosemide 20 MG tablet  Commonly known as: LASIX     isosorbide mononitrate 30 MG 24 hr tablet  Commonly known as: IMDUR              Indwelling Lines/Drains at time of discharge:   Lines/Drains/Airways       None                   Time spent on the discharge of  patient: 45 minutes         Shanda Moss DO  Department of Hospital Medicine  [facility] - Telemetry ([facility])

## 2023-10-31 NOTE — NURSING
Telemetry monitor removed and returned to monitor tech. PIV removed. Discharge instructions reviewed with patient including discharge medications, follow-up appointments, diet, and activity restrictions. Patient verbalizes understanding and voices no concerns. All personal belongings left with patient. Discharged to [facility], report called to Edith.  Awaiting transport